# Patient Record
Sex: FEMALE | Race: WHITE | NOT HISPANIC OR LATINO | Employment: FULL TIME | URBAN - METROPOLITAN AREA
[De-identification: names, ages, dates, MRNs, and addresses within clinical notes are randomized per-mention and may not be internally consistent; named-entity substitution may affect disease eponyms.]

---

## 2022-03-24 ENCOUNTER — LAB VISIT (OUTPATIENT)
Dept: LAB | Facility: HOSPITAL | Age: 62
End: 2022-03-24
Attending: INTERNAL MEDICINE
Payer: COMMERCIAL

## 2022-03-24 DIAGNOSIS — Z00.00 ROUTINE MEDICAL EXAM: ICD-10-CM

## 2022-03-24 LAB
ALBUMIN SERPL BCP-MCNC: 3.9 G/DL (ref 3.5–5.2)
ALP SERPL-CCNC: 91 U/L (ref 55–135)
ALT SERPL W/O P-5'-P-CCNC: 9 U/L (ref 10–44)
ANION GAP SERPL CALC-SCNC: 8 MMOL/L (ref 8–16)
AST SERPL-CCNC: 17 U/L (ref 10–40)
BASOPHILS # BLD AUTO: 0.09 K/UL (ref 0–0.2)
BASOPHILS NFR BLD: 0.8 % (ref 0–1.9)
BILIRUB SERPL-MCNC: 0.3 MG/DL (ref 0.1–1)
BUN SERPL-MCNC: 15 MG/DL (ref 8–23)
CALCIUM SERPL-MCNC: 9.6 MG/DL (ref 8.7–10.5)
CHLORIDE SERPL-SCNC: 106 MMOL/L (ref 95–110)
CHOLEST SERPL-MCNC: 186 MG/DL (ref 120–199)
CHOLEST/HDLC SERPL: 3 {RATIO} (ref 2–5)
CO2 SERPL-SCNC: 26 MMOL/L (ref 23–29)
CREAT SERPL-MCNC: 0.7 MG/DL (ref 0.5–1.4)
DIFFERENTIAL METHOD: ABNORMAL
EOSINOPHIL # BLD AUTO: 0.1 K/UL (ref 0–0.5)
EOSINOPHIL NFR BLD: 1 % (ref 0–8)
ERYTHROCYTE [DISTWIDTH] IN BLOOD BY AUTOMATED COUNT: 13.1 % (ref 11.5–14.5)
EST. GFR  (AFRICAN AMERICAN): >60 ML/MIN/1.73 M^2
EST. GFR  (NON AFRICAN AMERICAN): >60 ML/MIN/1.73 M^2
GLUCOSE SERPL-MCNC: 105 MG/DL (ref 70–110)
HCT VFR BLD AUTO: 41.3 % (ref 37–48.5)
HDLC SERPL-MCNC: 62 MG/DL (ref 40–75)
HDLC SERPL: 33.3 % (ref 20–50)
HGB BLD-MCNC: 13.3 G/DL (ref 12–16)
IMM GRANULOCYTES # BLD AUTO: 0.04 K/UL (ref 0–0.04)
IMM GRANULOCYTES NFR BLD AUTO: 0.4 % (ref 0–0.5)
LDLC SERPL CALC-MCNC: 104 MG/DL (ref 63–159)
LYMPHOCYTES # BLD AUTO: 2.8 K/UL (ref 1–4.8)
LYMPHOCYTES NFR BLD: 25 % (ref 18–48)
MCH RBC QN AUTO: 31.1 PG (ref 27–31)
MCHC RBC AUTO-ENTMCNC: 32.2 G/DL (ref 32–36)
MCV RBC AUTO: 97 FL (ref 82–98)
MONOCYTES # BLD AUTO: 0.7 K/UL (ref 0.3–1)
MONOCYTES NFR BLD: 5.8 % (ref 4–15)
NEUTROPHILS # BLD AUTO: 7.5 K/UL (ref 1.8–7.7)
NEUTROPHILS NFR BLD: 67 % (ref 38–73)
NONHDLC SERPL-MCNC: 124 MG/DL
NRBC BLD-RTO: 0 /100 WBC
PLATELET # BLD AUTO: 251 K/UL (ref 150–450)
PMV BLD AUTO: 11 FL (ref 9.2–12.9)
POTASSIUM SERPL-SCNC: 4 MMOL/L (ref 3.5–5.1)
PROT SERPL-MCNC: 6.6 G/DL (ref 6–8.4)
RBC # BLD AUTO: 4.28 M/UL (ref 4–5.4)
SODIUM SERPL-SCNC: 140 MMOL/L (ref 136–145)
TRIGL SERPL-MCNC: 100 MG/DL (ref 30–150)
WBC # BLD AUTO: 11.18 K/UL (ref 3.9–12.7)

## 2022-03-24 PROCEDURE — 85025 COMPLETE CBC W/AUTO DIFF WBC: CPT | Performed by: INTERNAL MEDICINE

## 2022-03-24 PROCEDURE — 80061 LIPID PANEL: CPT | Performed by: INTERNAL MEDICINE

## 2022-03-24 PROCEDURE — 36415 COLL VENOUS BLD VENIPUNCTURE: CPT | Performed by: INTERNAL MEDICINE

## 2022-03-24 PROCEDURE — 80053 COMPREHEN METABOLIC PANEL: CPT | Performed by: INTERNAL MEDICINE

## 2023-03-23 PROBLEM — F17.200 SMOKER: Status: ACTIVE | Noted: 2023-03-23

## 2024-06-10 ENCOUNTER — HOSPITAL ENCOUNTER (EMERGENCY)
Facility: HOSPITAL | Age: 64
Discharge: HOME | End: 2024-06-10
Attending: EMERGENCY MEDICINE
Payer: COMMERCIAL

## 2024-06-10 VITALS
RESPIRATION RATE: 20 BRPM | HEART RATE: 84 BPM | TEMPERATURE: 98 F | DIASTOLIC BLOOD PRESSURE: 98 MMHG | OXYGEN SATURATION: 98 % | SYSTOLIC BLOOD PRESSURE: 148 MMHG

## 2024-06-10 DIAGNOSIS — T14.8XXA ANIMAL BITE: Primary | ICD-10-CM
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
## 2024-06-10 DIAGNOSIS — T14.8XXA PUNCTURE WOUND: ICD-10-CM

## 2024-06-10 DIAGNOSIS — S61.012A LACERATION OF LEFT THUMB WITHOUT FOREIGN BODY WITHOUT DAMAGE TO NAIL, INITIAL ENCOUNTER: ICD-10-CM

## 2024-06-10 DIAGNOSIS — S41.112A LACERATION OF LEFT UPPER EXTREMITY, INITIAL ENCOUNTER: ICD-10-CM

## 2024-06-10 PROCEDURE — 12042 INTMD RPR N-HF/GENIT2.6-7.5: CPT | Mod: 59

## 2024-06-10 PROCEDURE — 99284 EMERGENCY DEPT VISIT MOD MDM: CPT | Mod: 25

## 2024-06-10 PROCEDURE — 90471 IMMUNIZATION ADMIN: CPT | Performed by: NURSE PRACTITIONER

## 2024-06-10 PROCEDURE — 12032 INTMD RPR S/A/T/EXT 2.6-7.5: CPT

## 2024-06-10 PROCEDURE — 25000003 PHARM REV CODE 250: Performed by: NURSE PRACTITIONER

## 2024-06-10 PROCEDURE — 63600175 PHARM REV CODE 636 W HCPCS: Performed by: NURSE PRACTITIONER

## 2024-06-10 PROCEDURE — 90715 TDAP VACCINE 7 YRS/> IM: CPT | Performed by: NURSE PRACTITIONER

## 2024-06-10 RX ORDER — LORAZEPAM 0.5 MG/1
0.5 TABLET ORAL
Status: COMPLETED | OUTPATIENT
Start: 2024-06-10 | End: 2024-06-10

## 2024-06-10 RX ORDER — AMOXICILLIN AND CLAVULANATE POTASSIUM 875; 125 MG/1; MG/1
1 TABLET, FILM COATED ORAL 2 TIMES DAILY
Qty: 14 TABLET | Refills: 0 | Status: SHIPPED | OUTPATIENT
Start: 2024-06-10

## 2024-06-10 RX ORDER — IBUPROFEN 800 MG/1
800 TABLET ORAL EVERY 6 HOURS PRN
Qty: 20 TABLET | Refills: 0 | Status: SHIPPED | OUTPATIENT
Start: 2024-06-10 | End: 2024-06-18

## 2024-06-10 RX ORDER — IBUPROFEN 400 MG/1
800 TABLET ORAL
Status: COMPLETED | OUTPATIENT
Start: 2024-06-10 | End: 2024-06-10

## 2024-06-10 RX ORDER — AMOXICILLIN AND CLAVULANATE POTASSIUM 875; 125 MG/1; MG/1
1 TABLET, FILM COATED ORAL
Status: COMPLETED | OUTPATIENT
Start: 2024-06-10 | End: 2024-06-10

## 2024-06-10 RX ORDER — LIDOCAINE HYDROCHLORIDE 10 MG/ML
5 INJECTION, SOLUTION EPIDURAL; INFILTRATION; INTRACAUDAL; PERINEURAL
Status: COMPLETED | OUTPATIENT
Start: 2024-06-10 | End: 2024-06-10

## 2024-06-10 RX ADMIN — LORAZEPAM 0.5 MG: 0.5 TABLET ORAL at 08:06

## 2024-06-10 RX ADMIN — AMOXICILLIN AND CLAVULANATE POTASSIUM 1 TABLET: 875; 125 TABLET, FILM COATED ORAL at 08:06

## 2024-06-10 RX ADMIN — IBUPROFEN 800 MG: 400 TABLET ORAL at 08:06

## 2024-06-10 RX ADMIN — TETANUS TOXOID, REDUCED DIPHTHERIA TOXOID AND ACELLULAR PERTUSSIS VACCINE, ADSORBED 0.5 ML: 5; 2.5; 8; 8; 2.5 SUSPENSION INTRAMUSCULAR at 08:06

## 2024-06-10 RX ADMIN — LIDOCAINE HYDROCHLORIDE 50 MG: 10 SOLUTION INTRAVENOUS at 08:06

## 2024-06-10 NOTE — ED PROVIDER NOTES
Encounter Date: 6/10/2024       History     Chief Complaint   Patient presents with    Animal Bite     Bitten by family dog 25M ago. Laceration to L forearm and L thumb. Rating pain 10/10. Very anxious.      64 yr old female presents to the ER with reports of dog bite to the left forearm and hand. Pt states she is not up to date on tetanus and dog is 1 year behind on vaccines however refuses rabies vaccine. Pt states she went to get a remote from the dog and then stuck her hand in his mouth when he jumped toward her. Bleeding is controlled. Pt has full ROM and sensation intact. No PMH reported.     The history is provided by the patient and a relative. No  was used.     Review of patient's allergies indicates:  No Known Allergies  No past medical history on file.  No past surgical history on file.  No family history on file.  Social History     Tobacco Use    Smoking status: Every Day     Current packs/day: 1.00     Average packs/day: 1 pack/day for 34.4 years (34.4 ttl pk-yrs)     Types: Cigarettes     Start date: 1/1/1990    Smokeless tobacco: Never   Substance Use Topics    Alcohol use: Never     Review of Systems   Skin:  Positive for wound.   All other systems reviewed and are negative.      Physical Exam     Initial Vitals [06/10/24 0823]   BP Pulse Resp Temp SpO2   (!) 195/98 84 20 97.7 °F (36.5 °C) 98 %      MAP       --         Physical Exam    Constitutional: She appears well-developed and well-nourished.   HENT:   Head: Normocephalic.   Right Ear: Hearing and tympanic membrane normal.   Left Ear: Hearing and tympanic membrane normal.   Nose: Nose normal.   Mouth/Throat: Oropharynx is clear and moist.   Eyes: Lids are normal. Pupils are equal, round, and reactive to light.   Neck:   Normal range of motion.  Cardiovascular:  Normal rate.           Pulmonary/Chest: Breath sounds normal. No respiratory distress. She has no wheezes. She has no rhonchi.   Abdominal: Abdomen is soft. There is  no abdominal tenderness.   Musculoskeletal:         General: Normal range of motion.      Cervical back: Normal range of motion. No rigidity.     Neurological: She is alert and oriented to person, place, and time.   Skin: Skin is warm and dry. Laceration noted.        There is a 4cm v shaped lac noted to the left forearm. Bleeding is controlled. No foreign body noted.     There is a 3cm macerated lac to the left thumb. Does not cross the nailbed. Pt has full ROM of the thumb with sensation intact.        Psychiatric: She has a normal mood and affect. Her behavior is normal. Judgment and thought content normal.         ED Course   Lac Repair    Date/Time: 6/10/2024 9:00 AM    Performed by: Marlyn Benton NP  Authorized by: Theo Pardo DO    Consent:     Risks discussed:  Infection  Universal protocol:     Procedure explained and questions answered to patient or proxy's satisfaction: yes      Immediately prior to procedure, a time out was called: yes      Patient identity confirmed:  Verbally with patient and arm band  Laceration details:     Location: Left forearm and thumb.  Pre-procedure details:     Preparation:  Patient was prepped and draped in usual sterile fashion  Exploration:     Wound extent: no foreign bodies/material noted, no muscle damage noted, no nerve damage noted, no tendon damage noted, no underlying fracture noted and no vascular damage noted    Treatment:     Area cleansed with:  Chlorhexidine    Amount of cleaning:  Extensive    Irrigation solution:  Sterile saline    Irrigation method:  Syringe    Debridement:  None  Skin repair:     Repair method:  Sutures    Suture size:  4-0    Suture material:  Nylon    Suture technique:  Simple interrupted    Number of sutures:  13  Approximation:     Approximation:  Close  Repair type:     Repair type:  Simple  Post-procedure details:     Dressing:  Non-adherent dressing    Procedure completion:  Tolerated well, no immediate  complications  Comments:      The wounds were approximated however left space in between due to this being dog bites. Pt notified on wound care, antbx prescribed and to monitor for any signs of infection.     Labs Reviewed - No data to display       Imaging Results    None          Medications   Tdap (BOOSTRIX) vaccine injection 0.5 mL (0.5 mLs Intramuscular Given 6/10/24 0842)   LIDOcaine (PF) 10 mg/ml (1%) injection 50 mg (50 mg Infiltration Given 6/10/24 0843)   amoxicillin-clavulanate 875-125mg per tablet 1 tablet (1 tablet Oral Given 6/10/24 0841)   ibuprofen tablet 800 mg (800 mg Oral Given 6/10/24 0842)   LORazepam tablet 0.5 mg (0.5 mg Oral Given 6/10/24 0841)     Medical Decision Making  Differential Diagnosis includes, but is not limited to:  Open fracture, vascular injury, tendon/ligament injury, nerve injury, retained foreign body, superficial laceration, abrasion.     Risk  Prescription drug management.               ED Course as of 06/10/24 2003   Mon Deven 10, 2024   1000 Patient tolerated the sutures well.  She was updated on her tetanus immunization in addition to given ibuprofen and Augmentin.  She will be sent with a prescription for Augmentin.  The patient has been told to follow up with her primary care provider within the next 5 days for suture removal in addition to a referral being placed to Dr. Melgoza.  I have thoroughly reviewed with the patient the need for wound care in addition to monitoring for any signs of infection.  Patient verbalized understanding.  She is nontoxic in appearance and has had much improvement in her condition after the Ativan was given.  She is otherwise stable at this time for discharge. [DT]      ED Course User Index  [DT] Marlyn Benton, YOLANDA                           Clinical Impression:  Final diagnoses:  [T14.8XXA] Animal bite (Primary)  [W54.0XXA] Dog bite, initial encounter  [S61.012A] Laceration of left thumb without foreign body without damage to nail,  initial encounter  [S41.112A] Laceration of left upper extremity, initial encounter  [T14.8XXA] Puncture wound          ED Disposition Condition    Discharge Stable          ED Prescriptions       Medication Sig Dispense Start Date End Date Auth. Provider    amoxicillin-clavulanate 875-125mg (AUGMENTIN) 875-125 mg per tablet Take 1 tablet by mouth 2 (two) times daily. 14 tablet 6/10/2024 -- Marlyn Benton NP    ibuprofen (ADVIL,MOTRIN) 800 MG tablet Take 1 tablet (800 mg total) by mouth every 6 (six) hours as needed for Pain. 20 tablet 6/10/2024 -- Marlyn Benton NP          Follow-up Information       Follow up With Specialties Details Why Contact Info    Arthur Bauman MD Internal Medicine Schedule an appointment as soon as possible for a visit in 1 week For suture removal [address]  [address]  [place] [address]  [phone]      Alessandro Melgoza Jr., MD Hand Surgery, Orthopedic Surgery Schedule an appointment as soon as possible for a visit in 3 days  [address]  [address]  [place] [address]  [phone]               Marlyn Benton NP  06/10/24 2004

## 2024-06-10 NOTE — DISCHARGE INSTRUCTIONS

## 2024-06-14 ENCOUNTER — TELEPHONE (OUTPATIENT)
Dept: ORTHOPEDICS | Facility: CLINIC | Age: 64
End: 2024-06-14
Payer: COMMERCIAL

## 2024-06-14 NOTE — TELEPHONE ENCOUNTER
----- Message from Liliya Suarez sent at 6/12/2024 12:44 PM CDT -----  Regarding: FW: ortho f/u  Idania Alford ,     Can you help me schedule this patient ?  ----- Message -----  From: Alessandro Melgoza Jr., MD  Sent: 6/12/2024  12:40 PM CDT  To: Liliya Suarez  Subject: RE: ortho f/u                                    Yes please make appt for the next 2-4 days  ----- Message -----  From: Liliya Suarez  Sent: 6/11/2024   4:19 PM CDT  To: Alessandro Melgoza Jr., MD  Subject: ortho f/u                                        Good-afternoon      When would you like to see this patient in clinic ?

## 2024-06-14 NOTE — TELEPHONE ENCOUNTER
Spoke to patient. Offered patient an appointment for 6/18 with Landy Guillory PA-C. Patient stated that she has an appointment scheduled with Dr. Bauman on Tuesday for suture removal and feel that she does not need to be seen by Ortho at this time because she is able to move her fingers. She stated she will give us a call if an appointment is needed.

## 2024-11-10 ENCOUNTER — HOSPITAL ENCOUNTER (INPATIENT)
Facility: HOSPITAL | Age: 64
LOS: 2 days | Discharge: HOME | DRG: 200 | End: 2024-11-12
Attending: EMERGENCY MEDICINE | Admitting: INTERNAL MEDICINE
Payer: COMMERCIAL

## 2024-11-10 DIAGNOSIS — J93.9 PNEUMOTHORAX ON LEFT: Primary | ICD-10-CM

## 2024-11-10 DIAGNOSIS — J44.9 CHRONIC OBSTRUCTIVE PULMONARY DISEASE, UNSPECIFIED COPD TYPE: ICD-10-CM

## 2024-11-10 DIAGNOSIS — R07.9 CHEST PAIN: ICD-10-CM

## 2024-11-10 PROBLEM — F17.200 TOBACCO DEPENDENCY: Status: ACTIVE | Noted: 2024-11-10

## 2024-11-10 LAB
ALBUMIN SERPL BCP-MCNC: 4 G/DL (ref 3.5–5.2)
ALP SERPL-CCNC: 106 U/L (ref 40–150)
ALT SERPL W/O P-5'-P-CCNC: 12 U/L (ref 10–44)
ANION GAP SERPL CALC-SCNC: 10 MMOL/L (ref 8–16)
AST SERPL-CCNC: 15 U/L (ref 10–40)
BASOPHILS # BLD AUTO: 0.06 K/UL (ref 0–0.2)
BASOPHILS NFR BLD: 1 % (ref 0–1.9)
BILIRUB SERPL-MCNC: 0.5 MG/DL (ref 0.1–1)
BNP SERPL-MCNC: 51 PG/ML (ref 0–99)
BUN SERPL-MCNC: 11 MG/DL (ref 8–23)
CALCIUM SERPL-MCNC: 9.4 MG/DL (ref 8.7–10.5)
CHLORIDE SERPL-SCNC: 107 MMOL/L (ref 95–110)
CO2 SERPL-SCNC: 22 MMOL/L (ref 23–29)
CREAT SERPL-MCNC: 0.7 MG/DL (ref 0.5–1.4)
D DIMER PPP IA.FEU-MCNC: 0.38 MG/L FEU
DIFFERENTIAL METHOD BLD: ABNORMAL
EOSINOPHIL # BLD AUTO: 0.1 K/UL (ref 0–0.5)
EOSINOPHIL NFR BLD: 2.1 % (ref 0–8)
ERYTHROCYTE [DISTWIDTH] IN BLOOD BY AUTOMATED COUNT: 13.2 % (ref 11.5–14.5)
EST. GFR  (NO RACE VARIABLE): >60 ML/MIN/1.73 M^2
GLUCOSE SERPL-MCNC: 160 MG/DL (ref 70–110)
HCT VFR BLD AUTO: 44.9 % (ref 37–48.5)
HGB BLD-MCNC: 15.3 G/DL (ref 12–16)
IMM GRANULOCYTES # BLD AUTO: 0.01 K/UL (ref 0–0.04)
IMM GRANULOCYTES NFR BLD AUTO: 0.2 % (ref 0–0.5)
LYMPHOCYTES # BLD AUTO: 2.2 K/UL (ref 1–4.8)
LYMPHOCYTES NFR BLD: 35.1 % (ref 18–48)
MAGNESIUM SERPL-MCNC: 1.8 MG/DL (ref 1.6–2.6)
MCH RBC QN AUTO: 31.7 PG (ref 27–31)
MCHC RBC AUTO-ENTMCNC: 34.1 G/DL (ref 32–36)
MCV RBC AUTO: 93 FL (ref 82–98)
MONOCYTES # BLD AUTO: 0.5 K/UL (ref 0.3–1)
MONOCYTES NFR BLD: 7.3 % (ref 4–15)
NEUTROPHILS # BLD AUTO: 3.4 K/UL (ref 1.8–7.7)
NEUTROPHILS NFR BLD: 54.3 % (ref 38–73)
NRBC BLD-RTO: 0 /100 WBC
PLATELET # BLD AUTO: 253 K/UL (ref 150–450)
PMV BLD AUTO: 10.6 FL (ref 9.2–12.9)
POTASSIUM SERPL-SCNC: 3.9 MMOL/L (ref 3.5–5.1)
PROT SERPL-MCNC: 7 G/DL (ref 6–8.4)
RBC # BLD AUTO: 4.83 M/UL (ref 4–5.4)
SODIUM SERPL-SCNC: 139 MMOL/L (ref 136–145)
TROPONIN I SERPL DL<=0.01 NG/ML-MCNC: <0.006 NG/ML (ref 0–0.03)
WBC # BLD AUTO: 6.19 K/UL (ref 3.9–12.7)

## 2024-11-10 PROCEDURE — 93005 ELECTROCARDIOGRAM TRACING: CPT

## 2024-11-10 PROCEDURE — 93010 ELECTROCARDIOGRAM REPORT: CPT | Mod: ,,, | Performed by: INTERNAL MEDICINE

## 2024-11-10 PROCEDURE — 25000003 PHARM REV CODE 250

## 2024-11-10 PROCEDURE — 80053 COMPREHEN METABOLIC PANEL: CPT | Performed by: PHYSICIAN ASSISTANT

## 2024-11-10 PROCEDURE — 85025 COMPLETE CBC W/AUTO DIFF WBC: CPT | Performed by: PHYSICIAN ASSISTANT

## 2024-11-10 PROCEDURE — 0W9B30Z DRAINAGE OF LEFT PLEURAL CAVITY WITH DRAINAGE DEVICE, PERCUTANEOUS APPROACH: ICD-10-PCS | Performed by: EMERGENCY MEDICINE

## 2024-11-10 PROCEDURE — 25000003 PHARM REV CODE 250: Performed by: STUDENT IN AN ORGANIZED HEALTH CARE EDUCATION/TRAINING PROGRAM

## 2024-11-10 PROCEDURE — 83880 ASSAY OF NATRIURETIC PEPTIDE: CPT | Performed by: PHYSICIAN ASSISTANT

## 2024-11-10 PROCEDURE — 63600175 PHARM REV CODE 636 W HCPCS

## 2024-11-10 PROCEDURE — 63600175 PHARM REV CODE 636 W HCPCS: Performed by: EMERGENCY MEDICINE

## 2024-11-10 PROCEDURE — 96374 THER/PROPH/DIAG INJ IV PUSH: CPT

## 2024-11-10 PROCEDURE — 99285 EMERGENCY DEPT VISIT HI MDM: CPT | Mod: 25

## 2024-11-10 PROCEDURE — 85379 FIBRIN DEGRADATION QUANT: CPT | Performed by: PHYSICIAN ASSISTANT

## 2024-11-10 PROCEDURE — 96375 TX/PRO/DX INJ NEW DRUG ADDON: CPT

## 2024-11-10 PROCEDURE — S4991 NICOTINE PATCH NONLEGEND: HCPCS | Performed by: STUDENT IN AN ORGANIZED HEALTH CARE EDUCATION/TRAINING PROGRAM

## 2024-11-10 PROCEDURE — 83735 ASSAY OF MAGNESIUM: CPT | Performed by: PHYSICIAN ASSISTANT

## 2024-11-10 PROCEDURE — 87486 CHLMYD PNEUM DNA AMP PROBE: CPT | Performed by: STUDENT IN AN ORGANIZED HEALTH CARE EDUCATION/TRAINING PROGRAM

## 2024-11-10 PROCEDURE — 32556 INSERT CATH PLEURA W/O IMAGE: CPT

## 2024-11-10 PROCEDURE — 11000001 HC ACUTE MED/SURG PRIVATE ROOM

## 2024-11-10 PROCEDURE — 84484 ASSAY OF TROPONIN QUANT: CPT | Performed by: PHYSICIAN ASSISTANT

## 2024-11-10 RX ORDER — ACETAMINOPHEN 325 MG/1
650 TABLET ORAL EVERY 4 HOURS PRN
Status: DISCONTINUED | OUTPATIENT
Start: 2024-11-10 | End: 2024-11-12 | Stop reason: HOSPADM

## 2024-11-10 RX ORDER — MORPHINE SULFATE 2 MG/ML
6 INJECTION, SOLUTION INTRAMUSCULAR; INTRAVENOUS
Status: COMPLETED | OUTPATIENT
Start: 2024-11-10 | End: 2024-11-10

## 2024-11-10 RX ORDER — OXYCODONE HYDROCHLORIDE 5 MG/1
5 TABLET ORAL EVERY 6 HOURS PRN
Status: DISCONTINUED | OUTPATIENT
Start: 2024-11-10 | End: 2024-11-10

## 2024-11-10 RX ORDER — SIMETHICONE 125 MG
125 TABLET,CHEWABLE ORAL EVERY 6 HOURS PRN
Status: DISCONTINUED | OUTPATIENT
Start: 2024-11-10 | End: 2024-11-12 | Stop reason: HOSPADM

## 2024-11-10 RX ORDER — OXYCODONE HYDROCHLORIDE 5 MG/1
5 TABLET ORAL EVERY 4 HOURS PRN
Status: DISCONTINUED | OUTPATIENT
Start: 2024-11-10 | End: 2024-11-12 | Stop reason: HOSPADM

## 2024-11-10 RX ORDER — IBUPROFEN 200 MG
16 TABLET ORAL
Status: DISCONTINUED | OUTPATIENT
Start: 2024-11-10 | End: 2024-11-12 | Stop reason: HOSPADM

## 2024-11-10 RX ORDER — NALOXONE HCL 0.4 MG/ML
0.02 VIAL (ML) INJECTION
Status: DISCONTINUED | OUTPATIENT
Start: 2024-11-10 | End: 2024-11-12 | Stop reason: HOSPADM

## 2024-11-10 RX ORDER — LIDOCAINE HYDROCHLORIDE 10 MG/ML
10 INJECTION, SOLUTION EPIDURAL; INFILTRATION; INTRACAUDAL; PERINEURAL ONCE
Status: COMPLETED | OUTPATIENT
Start: 2024-11-10 | End: 2024-11-10

## 2024-11-10 RX ORDER — LIDOCAINE HYDROCHLORIDE 10 MG/ML
10 INJECTION, SOLUTION INFILTRATION; PERINEURAL
Status: DISCONTINUED | OUTPATIENT
Start: 2024-11-10 | End: 2024-11-10

## 2024-11-10 RX ORDER — SODIUM CHLORIDE 0.9 % (FLUSH) 0.9 %
10 SYRINGE (ML) INJECTION EVERY 12 HOURS PRN
Status: DISCONTINUED | OUTPATIENT
Start: 2024-11-10 | End: 2024-11-12 | Stop reason: HOSPADM

## 2024-11-10 RX ORDER — MIDAZOLAM HYDROCHLORIDE 2 MG/2ML
1 INJECTION, SOLUTION INTRAMUSCULAR; INTRAVENOUS
Status: COMPLETED | OUTPATIENT
Start: 2024-11-10 | End: 2024-11-10

## 2024-11-10 RX ORDER — GLUCAGON 1 MG
1 KIT INJECTION
Status: DISCONTINUED | OUTPATIENT
Start: 2024-11-10 | End: 2024-11-12 | Stop reason: HOSPADM

## 2024-11-10 RX ORDER — IBUPROFEN 200 MG
1 TABLET ORAL DAILY
Status: DISCONTINUED | OUTPATIENT
Start: 2024-11-10 | End: 2024-11-12 | Stop reason: HOSPADM

## 2024-11-10 RX ORDER — ENOXAPARIN SODIUM 100 MG/ML
40 INJECTION SUBCUTANEOUS EVERY 24 HOURS
Status: DISCONTINUED | OUTPATIENT
Start: 2024-11-10 | End: 2024-11-12 | Stop reason: HOSPADM

## 2024-11-10 RX ORDER — IBUPROFEN 200 MG
24 TABLET ORAL
Status: DISCONTINUED | OUTPATIENT
Start: 2024-11-10 | End: 2024-11-12 | Stop reason: HOSPADM

## 2024-11-10 RX ADMIN — SIMETHICONE 125 MG: 125 TABLET, CHEWABLE ORAL at 10:11

## 2024-11-10 RX ADMIN — LIDOCAINE HYDROCHLORIDE 100 MG: 10 INJECTION, SOLUTION EPIDURAL; INFILTRATION; INTRACAUDAL at 01:11

## 2024-11-10 RX ADMIN — NICOTINE 1 PATCH: 14 PATCH, EXTENDED RELEASE TRANSDERMAL at 05:11

## 2024-11-10 RX ADMIN — ENOXAPARIN SODIUM 40 MG: 40 INJECTION SUBCUTANEOUS at 05:11

## 2024-11-10 RX ADMIN — OXYCODONE 5 MG: 5 TABLET ORAL at 10:11

## 2024-11-10 RX ADMIN — MIDAZOLAM HYDROCHLORIDE 1 MG: 1 INJECTION, SOLUTION INTRAMUSCULAR; INTRAVENOUS at 12:11

## 2024-11-10 RX ADMIN — OXYCODONE 5 MG: 5 TABLET ORAL at 05:11

## 2024-11-10 RX ADMIN — MORPHINE SULFATE 6 MG: 2 INJECTION, SOLUTION INTRAMUSCULAR; INTRAVENOUS at 12:11

## 2024-11-10 NOTE — ED TRIAGE NOTES
Patient arrives to the ED w/ complaints of L sided neck and chest pain. Reports pain starts in L neck and radiates to the subclavicular area. Reports heavy, pressure-like pain 10 out of 10 that has been constant for about 22 hr. Reports SOB secondary to the pain. Denies any pain changes w/ movement of L arm. Reports daily cigarette smoking. Denies cough except when smoking. Denies N/V/D, headache, blurred vision, dizziness, urinary changes.  Denies injury or trauma. Reports taking Aleve yesterday w/o relief.  at the bedside and reports patient is active and lifts heavy objects regularly.

## 2024-11-10 NOTE — ED NOTES
Pt seen in room AAOx4, states she came in for L sided chest pain that she has been experiencing for approximately one day. Pt is not in visible distress. Pleasant to interact with. On cardiac monitor, bed locked, in low position, and bed rails raised x2.  is at bedside.

## 2024-11-10 NOTE — H&P
[facility] Medicine H&P Note     Admitting Team: [facility] Hospitalist Team B  Attending Physician: Caro Swan MD  Resident: Eddie  Intern: Christina    Date of Admit: 11/10/2024    Chief Complaint     Chest Pain (The pt presents to the ED with a complaint of left sided chest pain/left shoulder pain/left sided neck pain that yesterday afternoon.  The pt describes the feeling as heaviness.  No N/V.)    Subjective:      History of Present Illness:  Christin Luna is a 64 y.o.  female who no known past medical history who presented top [facility] with left sided chest pain that radiated to left neck/shoulder for 2 days.     The patient was in their usual state of health (ambulatory, fairly active, independently completes all ADLs) until yesterday when she suddenly felt left sided chest pain radiating to her neck as she was moving some things around. She at first thought she may have pulled a muscle and attempted to rest for the rest of the day. She was unable to get comfortable and continued to have chest pain and pain when taking a deep breath. This morning, patient's shortness of breath and pain with inspiration worsened to the point where she came to the Emergency room to be checked out. Patient denied any nausea or vomiting but does endorse a chronic cough. Of note, patient has not seen a doctor in quite some time.     Past Medical History:  History reviewed. No pertinent past medical history.    Past Surgical History:  History reviewed. No pertinent surgical history.    Allergies:  Review of patient's allergies indicates:  No Known Allergies    Home Medications:  Naproxen 1-2 tablets prn     Family History:  No family history on file.    Social History:  Social History     Tobacco Use    Smoking status: Every Day     Current packs/day: 1.00     Average packs/day: 1 pack/day for 34.9 years (34.9 ttl pk-yrs)     Types: Cigarettes     Start date: 1/1/1990    Smokeless tobacco: Never   Substance  "Use Topics    Alcohol use: Never    Drug use: Not Currently       Review of Systems:  All other systems not mentioned in HPI were reviewed and are negative.    Health Maintaince :   Primary Care Physician: Dr. Bauman    Immunizations:   Tdap: up to date   Flu: NOT up to date   Pna: NOT up to date     Cancer Screening:  PAP: not up to date   MMG: up to date, due   Colonoscopy: up to date, due      Objective:   Last 24 Hour Vital Signs:  BP  Min: 135/63  Max: 179/83  Temp  Av °F (36.7 °C)  Min: 98 °F (36.7 °C)  Max: 98 °F (36.7 °C)  Pulse  Av.7  Min: 49  Max: 75  Resp  Av.4  Min: 17  Max: 40  SpO2  Av.6 %  Min: 93 %  Max: 100 %  Height  Av' 3" (160 cm)  Min: 5' 3" (160 cm)  Max: 5' 3" (160 cm)  Weight  Av.4 kg (120 lb)  Min: 54.4 kg (120 lb)  Max: 54.4 kg (120 lb)  Body mass index is 21.26 kg/m².  No intake/output data recorded.    Physical Examination:  Physical Exam  Vitals reviewed.   Constitutional:       General: She is awake.      Comments: Patient is clearly uncomfortable with pain from pigtail, though not in acute distress   HENT:      Head: Normocephalic.      Mouth/Throat:      Mouth: Mucous membranes are moist.      Pharynx: Oropharynx is clear.   Eyes:      Extraocular Movements: Extraocular movements intact.   Cardiovascular:      Rate and Rhythm: Normal rate and regular rhythm.      Heart sounds: Normal heart sounds.   Pulmonary:      Breath sounds: Normal breath sounds.      Comments: Pigtail placed to suction present on left upper flank  Abdominal:      General: Abdomen is flat. There is no distension.      Tenderness: There is no abdominal tenderness.   Musculoskeletal:         General: No tenderness.      Right lower leg: No edema.      Left lower leg: No edema.   Skin:     General: Skin is warm and dry.   Neurological:      General: No focal deficit present.      Mental Status: She is alert.   Psychiatric:         Mood and Affect: Mood normal.         Behavior: " Behavior is cooperative.          Laboratory:  Most Recent Data:  CBC:   Lab Results   Component Value Date    WBC 6.19 11/10/2024    HGB 15.3 11/10/2024    HCT 44.9 11/10/2024     11/10/2024    MCV 93 11/10/2024    RDW 13.2 11/10/2024     BMP:   Lab Results   Component Value Date     11/10/2024    K 3.9 11/10/2024     11/10/2024    CO2 22 (L) 11/10/2024    BUN 11 11/10/2024    CREATININE 0.7 11/10/2024     (H) 11/10/2024    CALCIUM 9.4 11/10/2024    MG 1.8 11/10/2024     LFTs:   Lab Results   Component Value Date    PROT 7.0 11/10/2024    ALBUMIN 4.0 11/10/2024    BILITOT 0.5 11/10/2024    AST 15 11/10/2024    ALKPHOS 106 11/10/2024    ALT 12 11/10/2024     Cardiac:   Lab Results   Component Value Date    TROPONINI <0.006 11/10/2024    BNP 51 11/10/2024       Trended Lab Data:  Recent Labs   Lab 11/10/24  1011   WBC 6.19   HGB 15.3   HCT 44.9      MCV 93   RDW 13.2      K 3.9      CO2 22*   BUN 11   CREATININE 0.7   *   PROT 7.0   ALBUMIN 4.0   BILITOT 0.5   AST 15   ALKPHOS 106   ALT 12       Trended Cardiac Data:  Recent Labs   Lab 11/10/24  1011   TROPONINI <0.006   BNP 51       Other Results:  EKG (my interpretation): sinus rhythm with frequent PVCs    Radiology:  Chest X-ray (11/10/24@ 10:54): Small to moderate left pneumothorax.  There is no evidence of mediastinal shift.  The lungs are clear.  No pleural fluid.  Osseous structures are intact.   Chest X-ray (11/10/24@ 13:56): Since the previous examination, left pleural drainage catheter has been placed with near complete re-expansion of the left hemithorax noting minimal apical pneumothorax remains.  There is subcutaneous emphysema along the left chest wall related to tube placement.  No additional detrimental changes since the previous exam.   CT Chest without contrast (11/10/2024): Left-sided pleural drainage catheter in appropriate position.  Small residual left pneumothorax. Aerated portions of the  lungs demonstrates diffuse centrilobular and paraseptal emphysematous change, fibro nodular biapical scarring, and bibasilar atelectasis.  No large region consolidation or pleural effusion. Scattered pulmonary micro nodules.  For multiple solid nodules all <6 mm, Fleischner Society 2017 guidelines recommend no routine follow up for a low risk patient, or follow up with non-contrast chest CT at 12 months after discovery in a high risk patient. Diffuse subcutaneous emphysema throughout the left axillary and thoracic soft tissues.     Assessment:     Christin Luna is a 64 y.o.  female who no known past medical history who presented top [facility] with left sided chest pain that radiated to left neck/shoulder for 2 days. This pain was associated with attempting to take a deep breath. Upon arrival to emergency room, cardiac lab workup was negative, but patient was found to have a left sided pneumothorax on chest xray, and left sided pigtail was subsequently placed with near complete resolution of pneumothorax. [facility] medicine will admit for management of pneumothorax.        Plan:     Left pneumothorax, small to moderate  -Patient with new left sided chest pain with radiation to left neck. Associated with increased pain upon deep inspiration  -Troponin <0.006, BNP wnl   -CXR significant for left sided pneumothorax  -Pigtail (L) placed in ER with near complete resolution of pneumothorax per repeat chest X-ray   -Pulmonology consulted  -CT Chest without contrast showed diffuse subcutaneous emphysema   -Continue pigtail with suction for now  -Pain control with oxycodone 5 mg q6h PRN and acetaminophen 650 mg q4h PRN    Chronic cough in association with significant smoking history   Likely undiagnosed COPD  -will need outpatient pulmonology follow up   -CT Chest without contrast showed diffuse subcutaneous emphysema   -PFTs outpatient     Smoking history   -patient with significant smoking history   -nicotine  patch ordered while inpatient   -Smoking cessation counseling     Healthcare Maintenance   -A1c, lipid panel ordered     Code: Full  Diet: Normal Adult  DVT Ppx: enoxaparin   Dispo: pending pneumothorax management and symptom resolution      Carol Vasquez MD  [facility] Neurology, PGY-1  [facility] Hospital Medicine Team B    [facility] Medicine Hospitalist Pager numbers:   [facility] Hospitalist Medicine Team A (Annette/Shalom): [phone]  [facility] Hospitalist Medicine Team B (Nery/Toshia):  [phone]

## 2024-11-10 NOTE — ED PROVIDER NOTES
"Encounter Date: 11/10/2024       History     Chief Complaint   Patient presents with    Chest Pain     The pt presents to the ED with a complaint of left sided chest pain/left shoulder pain/left sided neck pain that yesterday afternoon.  The pt describes the feeling as heaviness.  No N/V.     64-year-old female, denying significant past medical history, smoker, presents to ED with concern of left-sided neck, shoulder and chest pain that began suddenly yesterday around 3:00 p.m..  Pain has been constant, sharp, does somewhat worsened with touch but unchanged with movement, severity 10/10.  She she does report occasional "smoker's cough" but currently denying any shortness of breath.  No fevers, chills, URI like symptoms, abdominal pain, nausea, vomiting, diarrhea, urinary complaints.  No recent travel or surgery, history of DVT/PE, use of hormone medications, leg swelling.  Denies significant cardiac history.  No other acute complaints at this time    The history is provided by the patient.     Review of patient's allergies indicates:  No Known Allergies  History reviewed. No pertinent past medical history.  History reviewed. No pertinent surgical history.  No family history on file.  Social History     Tobacco Use    Smoking status: Every Day     Current packs/day: 1.00     Average packs/day: 1 pack/day for 34.9 years (34.9 ttl pk-yrs)     Types: Cigarettes     Start date: 1/1/1990    Smokeless tobacco: Never   Substance Use Topics    Alcohol use: Never    Drug use: Not Currently     Review of Systems   Constitutional:  Negative for chills and fever.   HENT:  Negative for congestion, ear pain and sore throat.    Respiratory:  Negative for cough and shortness of breath.    Cardiovascular:  Positive for chest pain. Negative for palpitations and leg swelling.   Gastrointestinal:  Negative for abdominal pain, diarrhea, nausea and vomiting.   Genitourinary:  Negative for dysuria.   Musculoskeletal:  Positive for " arthralgias and neck pain. Negative for neck stiffness.   Neurological:  Negative for headaches.       Physical Exam     Initial Vitals [11/10/24 0936]   BP Pulse Resp Temp SpO2   (!) 150/75 75 (!) 22 98 °F (36.7 °C) (!) 93 %      MAP       --         Physical Exam    Nursing note and vitals reviewed.  Constitutional: She appears well-developed and well-nourished. She is cooperative. She does not have a sickly appearance. She does not appear ill. No distress.   Anxious appearing   HENT:   Head: Normocephalic and atraumatic.   Eyes: EOM are normal.   Neck: Neck supple.   Mildly reproducible tenderness to left-sided cervical paraspinal muscle/trapezius.  No midline bony tenderness.  No skin changes or rashes.   Normal range of motion.  Cardiovascular:  Normal rate, regular rhythm and normal heart sounds.           Pulmonary/Chest: Effort normal and breath sounds normal.   Speaking in full sentences with no labored breathing.  No wheezing noted.  Lungs otherwise sound clear.  Left upper chest wall has mild tenderness with touch   Abdominal: Abdomen is soft. There is no abdominal tenderness.   Musculoskeletal:         General: No tenderness.      Cervical back: Normal range of motion and neck supple.     Neurological: She is alert and oriented to person, place, and time. She is not disoriented. GCS eye subscore is 4. GCS verbal subscore is 5. GCS motor subscore is 6.   Skin: Skin is warm and dry.   Psychiatric: She has a normal mood and affect. Her speech is normal and behavior is normal. Thought content normal.         ED Course   Chest Tube    Date/Time: 11/10/2024 2:16 PM  Location procedure was performed: [facility] EMERGENCY DEPARTMENT    Performed by: Caro Swan MD  Authorized by: Caro Swan MD  Consent Done: Yes  Consent: Verbal consent not obtained. Written consent obtained.  Risks and benefits: risks, benefits and alternatives were discussed  Consent given by: patient  Patient understanding: patient  "states understanding of the procedure being performed  Patient consent: the patient's understanding of the procedure matches consent given  Procedure consent: procedure consent matches procedure scheduled  Relevant documents: relevant documents present and verified  Test results: test results available and properly labeled  Site marked: the operative site was marked  Imaging studies: imaging studies available  Patient identity confirmed: , name and verbally with patient  Time out: Immediately prior to procedure a "time out" was called to verify the correct patient, procedure, equipment, support staff and site/side marked as required.  Indications: pneumothorax    Patient sedated: no  Anesthesia: local infiltration    Anesthesia:  Local Anesthetic: lidocaine 1% without epinephrine  Anesthetic total: 10 mL  Preparation: skin prepped with ChloraPrep  Placement location: left anterior  Scalpel size: 11  Tube size (Croatian): 14.  Ultrasound guidance: no  Tension pneumothorax heard: no  Tube connected to: water seal  Suture material: 0 silk  Dressing: 4x4 sterile gauze and Xeroform gauze  Post-insertion x-ray findings: tube in good position  Patient tolerance: Patient tolerated the procedure well with no immediate complications  Complications: No        Labs Reviewed   CBC W/ AUTO DIFFERENTIAL - Abnormal       Result Value    WBC 6.19      RBC 4.83      Hemoglobin 15.3      Hematocrit 44.9      MCV 93      MCH 31.7 (*)     MCHC 34.1      RDW 13.2      Platelets 253      MPV 10.6      Immature Granulocytes 0.2      Gran # (ANC) 3.4      Immature Grans (Abs) 0.01      Lymph # 2.2      Mono # 0.5      Eos # 0.1      Baso # 0.06      nRBC 0      Gran % 54.3      Lymph % 35.1      Mono % 7.3      Eosinophil % 2.1      Basophil % 1.0      Differential Method Automated     COMPREHENSIVE METABOLIC PANEL - Abnormal    Sodium 139      Potassium 3.9      Chloride 107      CO2 22 (*)     Glucose 160 (*)     BUN 11      Creatinine " 0.7      Calcium 9.4      Total Protein 7.0      Albumin 4.0      Total Bilirubin 0.5      Alkaline Phosphatase 106      AST 15      ALT 12      eGFR >60      Anion Gap 10     TROPONIN I    Troponin I <0.006     B-TYPE NATRIURETIC PEPTIDE    BNP 51     D DIMER, QUANTITATIVE    D-Dimer 0.38     MAGNESIUM    Magnesium 1.8            Imaging Results              X-Ray Chest 1 View (Final result)  Result time 11/10/24 13:56:53      Final result by Victor Manuel Willett MD (11/10/24 13:56:53)                   Impression:      As above      Electronically signed by: Victor Manuel Willett MD  Date:    11/10/2024  Time:    13:56               Narrative:    EXAMINATION:  XR CHEST 1 VIEW    CLINICAL HISTORY:  Pneumothorax, unspecified    TECHNIQUE:  Single frontal view of the chest was performed.    COMPARISON:  11/10/2024    FINDINGS:  Since the previous examination, left pleural drainage catheter has been placed with near complete re-expansion of the left hemithorax noting minimal apical pneumothorax remains.  There is subcutaneous emphysema along the left chest wall related to tube placement.  No additional detrimental changes since the previous exam.                                        X-Ray Chest AP Portable (Final result)  Result time 11/10/24 10:54:46      Final result by Vu Medina DO (11/10/24 10:54:46)                   Impression:      Left pneumothorax.    This report was flagged in Epic as abnormal.    Dr. Medina discussed critical findings with ROCÍO Neves by telephone at 10:53 on 11/10/2024.      Electronically signed by: Vu Medina  Date:    11/10/2024  Time:    10:54               Narrative:    EXAMINATION:  XR CHEST AP PORTABLE    CLINICAL HISTORY:  Chest Pain;    TECHNIQUE:  Single frontal view of the chest was performed.    COMPARISON:  None    FINDINGS:  Small to moderate left pneumothorax.  There is no evidence of mediastinal shift.  The lungs are clear.  No pleural fluid.  Osseous structures are  "intact.                                       Medications   sodium chloride 0.9% flush 10 mL (has no administration in time range)   naloxone 0.4 mg/mL injection 0.02 mg (has no administration in time range)   glucose chewable tablet 16 g (has no administration in time range)   glucose chewable tablet 24 g (has no administration in time range)   glucagon (human recombinant) injection 1 mg (has no administration in time range)   enoxaparin injection 40 mg (has no administration in time range)   acetaminophen tablet 650 mg (has no administration in time range)   oxyCODONE immediate release tablet 5 mg (has no administration in time range)   dextrose 10% bolus 125 mL 125 mL (has no administration in time range)   dextrose 10% bolus 250 mL 250 mL (has no administration in time range)   nicotine 14 mg/24 hr 1 patch (has no administration in time range)   LIDOcaine (PF) 10 mg/ml (1%) injection 100 mg (100 mg Infiltration Given 11/10/24 1308)   midazolam (PF) (VERSED) 1 mg/mL injection 1 mg (1 mg Intravenous Given 11/10/24 1231)   morphine injection 6 mg (6 mg Intravenous Given 11/10/24 1255)     Medical Decision Making  Patient presents with concern of left upper chest pain that radiates towards neck and shoulder that began yesterday afternoon.  Pain has been constant.  Denying shortness of breath.  Does report "smoker's cough" at baseline.  Afebrile.  Patient is anxious on exam but otherwise in no distress    DDx:  Including but not limited to ACS, CAD, STEMI, NSTEMI, angina, PE, pneumonia, pneumothorax, pleural effusion, musculoskeletal, costochondritis, pleurisy, anxiety, stress    Amount and/or Complexity of Data Reviewed  Labs: ordered.  Radiology: ordered.              Attending Attestation:         Attending Critical Care:   Critical Care Times:   Direct Patient Care (initial evaluation, reassessments, and time considering the case)................................................................15 minutes. "   Additional History from reviewing old medical records or taking additional history from the family, EMS, PCP, etc.......................5 minutes.   Ordering, Reviewing, and Interpreting Diagnostic Studies...............................................................................................................5 minutes.   Documentation..................................................................................................................................................................................5 minutes.   Consultation with other Physicians. .................................................................................................................................................5 minutes.   ==============================================================  Total Critical Care Time - exclusive of procedural time: 35 minutes.  ==============================================================  Critical care reasons: Pneumothorax.   The following critical care procedures were done by me (see procedure notes): chest tube placement (Chest tube placement performed by attending, Dr. Swan).   Critical care was time spent personally by me on the following activities: obtaining history from patient or relative, examination of patient, review of old charts, ordering lab, x-rays, and/or EKG, development of treatment plan with patient or relative, ordering and performing treatments and interventions, evaluation of patient's response to treatment, discussion with consultants and re-evaluation of patient's conition.   Critical Care Condition: potentially life-threatening           ED Course as of 11/10/24 1522   Sun Nov 10, 2024   1039 CBC auto differential(!)  Grossly unremarkable.  No elevation WBC [KS]   1039 Comprehensive metabolic panel(!)  Grossly unremarkable.  Creatinine WNL [KS]   1039 Magnesium  WNL [KS]   1039 BNP  WNL [KS]   1048 Troponin I #1  WNL [KS]   1048 D dimer, quantitative  WNL [KS]    1128 X-Ray Chest AP Portable(!)  Phone call received from Radiology, Dr. Medina, with concern of left-sided pneumothorax.  Does not appear to be tension pneumo. [KS]   1128 Order placed for non-rebreather.  Patient otherwise continues to appear stable [KS]   1301 Dr. Swan at bedside.  Pigtail placed [KS]   1517 X-Ray Chest 1 View  Repeat chest x-ray per Radiology review showing left pleural drainage catheter has been placed with near complete re-expansion of the left hemithorax [KS]   1518 Patient continues to rest comfortably.  Patient was discussed with [facility] internal medicine team, who has accepted patient for admission. [KS]   1518 Patient was discussed with and seen by attending, Dr. Swan, who agrees with ED course and dispo [KS]      ED Course User Index  [KS] Braden Neves PA-C                             Clinical Impression:  Final diagnoses:  [R07.9] Chest pain  [J93.9] Pneumothorax on left (Primary)          ED Disposition Condition    Admit Stable                Braden Neves PA-C  11/10/24 5842

## 2024-11-10 NOTE — PHARMACY MED REC
"    [facility]           Pharmacy  Admission Medication History     The home medication history was taken by Malena Hitchcock.      Medication history obtained from Medications listed below were obtained from: Patient/family.    Based on information gathered for medication list, you may go to "Admission" then "Reconcile Home Medications" tabs to review and/or act upon those items.     The home medication list has been updated by the Pharmacy department.   Please read ALL comments highlighted in yellow.   Please address this information as you see fit.    Feel free to contact us if you have any questions or require assistance.    The medications listed below were removed from the home medication list.  Please reorder if appropriate:    Patient reports NOT TAKING the following medication(s):  Augmentin 875-125 mg tab    No current facility-administered medications on file prior to encounter.     Current Outpatient Medications on File Prior to Encounter   Medication Sig Dispense Refill    naproxen sodium (ALEVE ORAL) Take 1-2 tablets by mouth as needed.         Please address this information as you see fit.  Feel free to contact us if you have any questions or require assistance.    Malena Hitchcock  [phone]              .          "

## 2024-11-10 NOTE — CONSULTS
[facility] Consult Note    Primary Attending Physician: Dr. Gabriel  Consultant Attending: Derrick Meadows MD  Consultant Fellow: Cal Escobar MD    Reason for Consult:     Pneumothorax s/p pigtail catheter placement    Subjective:      History of Present Illness:  Christin Luna is a 64 y.o.  female who  has no past medical history on file.. The patient presented to the [facility] on 11/10/2024 with a primary complaint of left sided chest pain/left shoulder pain/left sided neck pain that started yesterday afternoon.  The pt describes the feeling as heaviness.  No N/V.   In the ED, a CXR showed a Left-sided pneumothorax and a 14F pigtail catheter was placed by ED attending.   Repeat CXR showed re-expansion of L lung and some subQ emphysema.   Vital signs and Labs mostly within normal limits.     No previous history of PTX. No chest trauma. Reports some increased cough over the last few days but only clear secretions. Some chills at home, but no fevers. No recent airplane travel. Symptoms started suddenly around 3pm in the form of  L shoulder pain.  Current smoker and has smoked for decades but no previous diagnosis of COPD/Emphysema.        Pulmonology consulted to assist  with pleural catheter/pneumothorax management.      Past Medical History:  History reviewed. No pertinent past medical history.    Past Surgical History:  History reviewed. No pertinent surgical history.    Allergies:  Review of patient's allergies indicates:  No Known Allergies    Medications:   In-Hospital Scheduled Medications:     In-Hospital PRN Medications:     In-Hospital IV Infusion Medications:     Home Medications:  Prior to Admission medications    Medication Sig Start Date End Date Taking? Authorizing Provider   naproxen sodium (ALEVE ORAL) Take 1-2 tablets by mouth as needed.   Yes Provider, Historical   amoxicillin-clavulanate 875-125mg (AUGMENTIN) 875-125 mg per tablet Take 1 tablet by mouth 2 (two)  "times daily. 6/10/24 11/10/24  Marlyn Benton NP       Family History:  No family history on file.    Social History:  Social History     Tobacco Use    Smoking status: Every Day     Current packs/day: 1.00     Average packs/day: 1 pack/day for 34.9 years (34.9 ttl pk-yrs)     Types: Cigarettes     Start date: 1990    Smokeless tobacco: Never   Substance Use Topics    Alcohol use: Never    Drug use: Not Currently       Review of Systems:  Pertinent items are noted in HPI. All other systems are reviewed and are negative.     Objective:   Last 24 Hour Vital Signs:  BP  Min: 135/63  Max: 179/83  Temp  Av °F (36.7 °C)  Min: 98 °F (36.7 °C)  Max: 98 °F (36.7 °C)  Pulse  Av.7  Min: 49  Max: 75  Resp  Av.4  Min: 17  Max: 40  SpO2  Av.6 %  Min: 93 %  Max: 100 %  Height  Av' 3" (160 cm)  Min: 5' 3" (160 cm)  Max: 5' 3" (160 cm)  Weight  Av.4 kg (120 lb)  Min: 54.4 kg (120 lb)  Max: 54.4 kg (120 lb)  No intake/output data recorded.    Physical Examination:  Gen: Alert, in mild distress due to pain.   HEENT: NC/AT, PERRLA, anicteric sclerae, TMs clear, nasal mucosa pink, OP clear   Neck: Supple, no LAD, no thyromegaly   CV: RRR, no m/r/g, pulses 2+ symmetric   Resp: CTA bilat, no wheezes/r/r. There is a Left lateral Pleural catheter with adequate tidaling and intermittent air leak.   Abd: Soft, NT/ND, BS present, no HSM   MSK: FROM, no deformities, no tenderness   Neuro: A&Ox3, CN II-XII intact, 5/5 strength, reflexes 2+, sensation intact Skin: Warm, dry, no r/l        Laboratory:  Trended Lab Data:  Recent Labs     11/10/24  1011   WBC 6.19   HGB 15.3   HCT 44.9         K 3.9      CO2 22*   BUN 11   CREATININE 0.7   *   BILITOT 0.5   AST 15   ALT 12   ALKPHOS 106   CALCIUM 9.4   ALBUMIN 4.0   PROT 7.0   MG 1.8       Cardiac:   Recent Labs   Lab 11/10/24  1011   TROPONINI <0.006   BNP 51       Urinalysis: No results found for: "LABURIN", "COLORU", "CLARITYU", " ""SPECGRAV", "LABSPEC", "NITRITE", "PROTEINUR", "GLUCOSEU", "KETONESU", "UROBILINOGEN", "BILIRUBINUR", "BLOODU"    Microbiology:  Microbiology Results (last 7 days)       ** No results found for the last 168 hours. **            Radiology:  CXR from presentation with L moderately sized pneumothorax. Repeat XR post 14F pleural catheter placement with re-expansion of the L lung and some subcutaneous emphysema.     I have personally reviewed the above labs and imaging.       Assessment:     Christin Luna is a 64 y.o. female with:  Patient Active Problem List    Diagnosis Date Noted    Smoker 03/23/2023        Plan:     #Secondary Spontaneous pneumothorax  # Intermittent air leak  # Current Smoker/ tobacco use disorder  - s/p 14F pleural catheter placement in the ED. Reviewed repeat XR with adequate lung re-expansion   No identifiable triggering event. Some vigorous coughing lately per patient but no chest trauma/falls. No air travels or hx of chest surgeries.  - Suspect ruptured bleb from emphysema as most likely etiology given smoking history and increased lung volumes on CXR, but will need CT to assess lung parenchyma.  - No clear signs of infection.     Recommendations:  - Keep chest tube to suction -20 cmH20 overnight.  - Obtain non-contrast chest CT.   - Pain management per primary  - Can consider switching to water seal tomorrow during the day to assess if PTX expands.  - Smoking cessation counseling on discharge.   - Consider Nicotine patch if patient would like it.  - Will check RIDP PCR to rule out viral infection given patient's cough as of lately.       Patient seen by Dr. Meadows.     Thank you for allowing us to participate in the care of this patient. Please contact me if you have any questions regarding this consult.    Cal Escobar MD  [facility] Fellow    "

## 2024-11-11 LAB
ADENOVIRUS: NOT DETECTED
ALBUMIN SERPL BCP-MCNC: 3.7 G/DL (ref 3.5–5.2)
ALP SERPL-CCNC: 103 U/L (ref 40–150)
ALT SERPL W/O P-5'-P-CCNC: 12 U/L (ref 10–44)
ANION GAP SERPL CALC-SCNC: 9 MMOL/L (ref 8–16)
AST SERPL-CCNC: 16 U/L (ref 10–40)
BASOPHILS # BLD AUTO: 0.05 K/UL (ref 0–0.2)
BASOPHILS NFR BLD: 0.6 % (ref 0–1.9)
BILIRUB SERPL-MCNC: 0.5 MG/DL (ref 0.1–1)
BORDETELLA PARAPERTUSSIS (IS1001): NOT DETECTED
BORDETELLA PERTUSSIS (PTXP): NOT DETECTED
BUN SERPL-MCNC: 13 MG/DL (ref 8–23)
CALCIUM SERPL-MCNC: 9.2 MG/DL (ref 8.7–10.5)
CHLAMYDIA PNEUMONIAE: NOT DETECTED
CHLORIDE SERPL-SCNC: 105 MMOL/L (ref 95–110)
CHOLEST SERPL-MCNC: 228 MG/DL (ref 120–199)
CHOLEST/HDLC SERPL: 3.9 {RATIO} (ref 2–5)
CO2 SERPL-SCNC: 23 MMOL/L (ref 23–29)
CORONAVIRUS 229E, COMMON COLD VIRUS: NOT DETECTED
CORONAVIRUS HKU1, COMMON COLD VIRUS: NOT DETECTED
CORONAVIRUS NL63, COMMON COLD VIRUS: NOT DETECTED
CORONAVIRUS OC43, COMMON COLD VIRUS: NOT DETECTED
CREAT SERPL-MCNC: 0.7 MG/DL (ref 0.5–1.4)
DIFFERENTIAL METHOD BLD: ABNORMAL
EOSINOPHIL # BLD AUTO: 0.1 K/UL (ref 0–0.5)
EOSINOPHIL NFR BLD: 0.8 % (ref 0–8)
ERYTHROCYTE [DISTWIDTH] IN BLOOD BY AUTOMATED COUNT: 13.3 % (ref 11.5–14.5)
EST. GFR  (NO RACE VARIABLE): >60 ML/MIN/1.73 M^2
ESTIMATED AVG GLUCOSE: 108 MG/DL (ref 68–131)
FLUBV RNA NPH QL NAA+NON-PROBE: NOT DETECTED
GLUCOSE SERPL-MCNC: 93 MG/DL (ref 70–110)
HBA1C MFR BLD: 5.4 % (ref 4–5.6)
HCT VFR BLD AUTO: 41.8 % (ref 37–48.5)
HDLC SERPL-MCNC: 58 MG/DL (ref 40–75)
HDLC SERPL: 25.4 % (ref 20–50)
HGB BLD-MCNC: 14.1 G/DL (ref 12–16)
HPIV1 RNA NPH QL NAA+NON-PROBE: NOT DETECTED
HPIV2 RNA NPH QL NAA+NON-PROBE: NOT DETECTED
HPIV3 RNA NPH QL NAA+NON-PROBE: NOT DETECTED
HPIV4 RNA NPH QL NAA+NON-PROBE: NOT DETECTED
HUMAN METAPNEUMOVIRUS: NOT DETECTED
IMM GRANULOCYTES # BLD AUTO: 0.02 K/UL (ref 0–0.04)
IMM GRANULOCYTES NFR BLD AUTO: 0.2 % (ref 0–0.5)
INFLUENZA A (SUBTYPES H1,H1-2009,H3): NOT DETECTED
LDLC SERPL CALC-MCNC: 141.8 MG/DL (ref 63–159)
LYMPHOCYTES # BLD AUTO: 2.9 K/UL (ref 1–4.8)
LYMPHOCYTES NFR BLD: 33.8 % (ref 18–48)
MAGNESIUM SERPL-MCNC: 1.9 MG/DL (ref 1.6–2.6)
MCH RBC QN AUTO: 31.8 PG (ref 27–31)
MCHC RBC AUTO-ENTMCNC: 33.7 G/DL (ref 32–36)
MCV RBC AUTO: 94 FL (ref 82–98)
MONOCYTES # BLD AUTO: 0.6 K/UL (ref 0.3–1)
MONOCYTES NFR BLD: 6.9 % (ref 4–15)
MYCOPLASMA PNEUMONIAE: NOT DETECTED
NEUTROPHILS # BLD AUTO: 5 K/UL (ref 1.8–7.7)
NEUTROPHILS NFR BLD: 57.7 % (ref 38–73)
NONHDLC SERPL-MCNC: 170 MG/DL
NRBC BLD-RTO: 0 /100 WBC
PHOSPHATE SERPL-MCNC: 3.2 MG/DL (ref 2.7–4.5)
PLATELET # BLD AUTO: 223 K/UL (ref 150–450)
PMV BLD AUTO: 11.1 FL (ref 9.2–12.9)
POTASSIUM SERPL-SCNC: 4.5 MMOL/L (ref 3.5–5.1)
PROT SERPL-MCNC: 6.6 G/DL (ref 6–8.4)
RBC # BLD AUTO: 4.44 M/UL (ref 4–5.4)
RESPIRATORY INFECTION PANEL SOURCE: NORMAL
RSV RNA NPH QL NAA+NON-PROBE: NOT DETECTED
RV+EV RNA NPH QL NAA+NON-PROBE: NOT DETECTED
SARS-COV-2 RNA RESP QL NAA+PROBE: NOT DETECTED
SODIUM SERPL-SCNC: 137 MMOL/L (ref 136–145)
TRIGL SERPL-MCNC: 141 MG/DL (ref 30–150)
WBC # BLD AUTO: 8.67 K/UL (ref 3.9–12.7)

## 2024-11-11 PROCEDURE — 85025 COMPLETE CBC W/AUTO DIFF WBC: CPT

## 2024-11-11 PROCEDURE — 84100 ASSAY OF PHOSPHORUS: CPT

## 2024-11-11 PROCEDURE — 63600175 PHARM REV CODE 636 W HCPCS

## 2024-11-11 PROCEDURE — 21400001 HC TELEMETRY ROOM

## 2024-11-11 PROCEDURE — 99900035 HC TECH TIME PER 15 MIN (STAT)

## 2024-11-11 PROCEDURE — 80061 LIPID PANEL: CPT

## 2024-11-11 PROCEDURE — S4991 NICOTINE PATCH NONLEGEND: HCPCS | Performed by: STUDENT IN AN ORGANIZED HEALTH CARE EDUCATION/TRAINING PROGRAM

## 2024-11-11 PROCEDURE — 80053 COMPREHEN METABOLIC PANEL: CPT

## 2024-11-11 PROCEDURE — 25000003 PHARM REV CODE 250

## 2024-11-11 PROCEDURE — 27000221 HC OXYGEN, UP TO 24 HOURS

## 2024-11-11 PROCEDURE — 94761 N-INVAS EAR/PLS OXIMETRY MLT: CPT

## 2024-11-11 PROCEDURE — 83036 HEMOGLOBIN GLYCOSYLATED A1C: CPT

## 2024-11-11 PROCEDURE — 83735 ASSAY OF MAGNESIUM: CPT

## 2024-11-11 PROCEDURE — 25000003 PHARM REV CODE 250: Performed by: STUDENT IN AN ORGANIZED HEALTH CARE EDUCATION/TRAINING PROGRAM

## 2024-11-11 RX ORDER — DOCUSATE SODIUM 100 MG/1
100 CAPSULE, LIQUID FILLED ORAL DAILY
Status: DISCONTINUED | OUTPATIENT
Start: 2024-11-11 | End: 2024-11-12 | Stop reason: HOSPADM

## 2024-11-11 RX ORDER — POLYETHYLENE GLYCOL 3350 17 G/17G
17 POWDER, FOR SOLUTION ORAL DAILY
Status: DISCONTINUED | OUTPATIENT
Start: 2024-11-11 | End: 2024-11-12 | Stop reason: HOSPADM

## 2024-11-11 RX ADMIN — DOCUSATE SODIUM 100 MG: 100 CAPSULE, LIQUID FILLED ORAL at 11:11

## 2024-11-11 RX ADMIN — SIMETHICONE 125 MG: 125 TABLET, CHEWABLE ORAL at 11:11

## 2024-11-11 RX ADMIN — ENOXAPARIN SODIUM 40 MG: 40 INJECTION SUBCUTANEOUS at 04:11

## 2024-11-11 RX ADMIN — OXYCODONE 5 MG: 5 TABLET ORAL at 02:11

## 2024-11-11 RX ADMIN — NICOTINE 1 PATCH: 14 PATCH, EXTENDED RELEASE TRANSDERMAL at 09:11

## 2024-11-11 RX ADMIN — OXYCODONE 5 MG: 5 TABLET ORAL at 09:11

## 2024-11-11 NOTE — PLAN OF CARE
CM met with pt at bedside to discuss discharge planning. 02 in use, Chest Tube present. She lives with spouse. Pt is independent with ADL's. Pt has no DME/HH services. Upon discharge, pts family member Heladio Beasley (Spouse) [phone] (Mobile)  will provide transport home. Pt made aware to contact CM with any questions or concerns. CM will continue to follow and assist with any discharge needs.     [place] - Med Surg  Initial Discharge Assessment       Primary Care Provider: Arthur Bauman MD    Admission Diagnosis: Pneumothorax on left [J93.9]  Chest pain [R07.9]    Admission Date: 11/10/2024  Expected Discharge Date:     Transition of Care Barriers: (P) None    Payor: BLUE CROSS BLUE [employer] / Plan: BLUE CONNECT / Product Type: HMO /     Extended Emergency Contact Information  Primary Emergency Contact: Heladio beasley  Mobile Phone: [phone]  Relation: Spouse    Discharge Plan A: (P) Home, Home with family         [id number] [place] - [address] AT [facility]  [address]  [place] [address]  Phone: [phone] Fax: [phone]      Initial Assessment (most recent)       Adult Discharge Assessment - 11/11/24 1520          Discharge Assessment    Assessment Type Discharge Planning Assessment     Confirmed/corrected address, phone number and insurance Yes     Confirmed Demographics Correct on Facesheet     Source of Information patient     When was your last doctors appointment? 06/18/24 (P)      Communicated JOSE ELIAS with patient/caregiver Date not available/Unable to determine (P)      Reason For Admission Pneumothorax on left (P)      People in Home spouse (P)      Do you expect to return to your current living situation? Yes (P)      Do you have help at home or someone to help you manage your care at home? Yes (P)      Who are your caregiver(s) and their phone number(s)? Heladio beasley (Spouse)  [phone] (Mobile) (P)      Prior to hospitilization cognitive  status: Alert/Oriented (P)      Current cognitive status: Alert/Oriented (P)      Walking or Climbing Stairs Difficulty no (P)      Dressing/Bathing Difficulty no (P)      Equipment Currently Used at Home none (P)      Readmission within 30 days? No (P)      Patient currently being followed by outpatient case management? No (P)      Do you currently have service(s) that help you manage your care at home? No (P)      Do you take prescription medications? No (P)      Do you have prescription coverage? Yes (P)      Coverage BCBS (P)      Do you have any problems affording any of your prescribed medications? No (P)      Who is going to help you get home at discharge? Heladio beasley (Spouse) [phone] (Mobile) (P)      How do you get to doctors appointments? car, drives self (P)      Are you on dialysis? No (P)      Do you take coumadin? No (P)      Discharge Plan A Home;Home with family (P)      DME Needed Upon Discharge  none (P)      Discharge Plan discussed with: Patient;Spouse/sig other (P)      Name(s) and Number(s) Heladio beasley (Spouse) [phone] (Mobile) (P)      Transition of Care Barriers None (P)         Physical Activity    On average, how many days per week do you engage in moderate to strenuous exercise (like a brisk walk)? 6 days (P)      On average, how many minutes do you engage in exercise at this level? 40 min (P)         Financial Resource Strain    How hard is it for you to pay for the very basics like food, housing, medical care, and heating? Not hard at all (P)         Housing Stability    In the last 12 months, was there a time when you were not able to pay the mortgage or rent on time? No (P)      At any time in the past 12 months, were you homeless or living in a shelter (including now)? No (P)         Transportation Needs    Has the lack of transportation kept you from medical appointments, meetings, work or from getting things needed for daily living? No (P)         Food Insecurity     Within the past 12 months, you worried that your food would run out before you got the money to buy more. Never true (P)      Within the past 12 months, the food you bought just didn't last and you didn't have money to get more. Never true (P)         Stress    Do you feel stress - tense, restless, nervous, or anxious, or unable to sleep at night because your mind is troubled all the time - these days? Only a little (P)         Social Isolation    How often do you feel lonely or isolated from those around you?  Never (P)         Alcohol Use    Q1: How often do you have a drink containing alcohol? Never (P)      Q2: How many drinks containing alcohol do you have on a typical day when you are drinking? Patient does not drink (P)      Q3: How often do you have six or more drinks on one occasion? Never (P)         Utilities    In the past 12 months has the electric, gas, oil, or water company threatened to shut off services in your home? Yes (P)         Health Literacy    How often do you need to have someone help you when you read instructions, pamphlets, or other written material from your doctor or pharmacy? Never (P)         OTHER    Name(s) of People in Home Heladio beasley (Spouse) [phone] (Mobile) (P)

## 2024-11-11 NOTE — PROGRESS NOTES
"[facility] Medicine Progress Note    Primary Team: [facility] Hospitalist Team B  Attending Physician: Attila Gabriel MD  Resident: Eddie  Intern: Christina    Subjective:      No acute events overnight. Patient resting bed comfortably. No overt increased work of breathing. States she feels much better compared to yesterday and has no current issues breathing.      Objective:     Last 24 Hour Vital Signs:  BP  Min: 120/62  Max: 179/83  Temp  Av °F (36.7 °C)  Min: 97.8 °F (36.6 °C)  Max: 98.2 °F (36.8 °C)  Pulse  Av.5  Min: 45  Max: 75  Resp  Av.4  Min: 17  Max: 40  SpO2  Av %  Min: 93 %  Max: 100 %  Height  Av' 3" (160 cm)  Min: 5' 3" (160 cm)  Max: 5' 3" (160 cm)  Weight  Av.5 kg (115 lb 13.4 oz)  Min: 51.4 kg (113 lb 5.1 oz)  Max: 54.4 kg (120 lb)  I/O last 3 completed shifts:  In: 300 [P.O.:300]  Out: -     Physical Examination:  Physical Exam  Vitals reviewed.   Constitutional:       General: She is not in acute distress.     Appearance: She is not ill-appearing.   HENT:      Head: Normocephalic.      Mouth/Throat:      Mouth: Mucous membranes are moist.      Pharynx: Oropharynx is clear.   Eyes:      Extraocular Movements: Extraocular movements intact.   Cardiovascular:      Rate and Rhythm: Normal rate and regular rhythm.      Pulses: Normal pulses.      Heart sounds: Normal heart sounds.   Pulmonary:      Effort: Pulmonary effort is normal.      Comments: Pigtail placed on left upper flank  Abdominal:      General: Abdomen is flat. There is no distension.      Tenderness: There is no abdominal tenderness.   Skin:     General: Skin is warm and dry.   Neurological:      General: No focal deficit present.      Mental Status: She is alert.   Psychiatric:         Mood and Affect: Mood normal.          Laboratory:  Laboratory Data Reviewed: yes      Other Results:  EKG (my interpretation): Sinus rhythm with frequent PVCs     Radiology Data Reviewed: yes  Pertinent Findings:  Chest X-ray " (11/10/24@ 10:54): Small to moderate left pneumothorax.  There is no evidence of mediastinal shift.  The lungs are clear.  No pleural fluid.  Osseous structures are intact.   Chest X-ray (11/10/24@ 13:56): Since the previous examination, left pleural drainage catheter has been placed with near complete re-expansion of the left hemithorax noting minimal apical pneumothorax remains.  There is subcutaneous emphysema along the left chest wall related to tube placement.  No additional detrimental changes since the previous exam.   CT Chest without contrast (11/10/2024): Left-sided pleural drainage catheter in appropriate position.  Small residual left pneumothorax. Aerated portions of the lungs demonstrates diffuse centrilobular and paraseptal emphysematous change, fibro nodular biapical scarring, and bibasilar atelectasis.  No large region consolidation or pleural effusion. Scattered pulmonary micro nodules.  For multiple solid nodules all <6 mm, Fleischner Society 2017 guidelines recommend no routine follow up for a low risk patient, or follow up with non-contrast chest CT at 12 months after discovery in a high risk patient. Diffuse subcutaneous emphysema throughout the left axillary and thoracic soft tissues.    Current Medications:     Infusions:       Scheduled:   enoxparin  40 mg Subcutaneous Daily    nicotine  1 patch Transdermal Daily        PRN:    Current Facility-Administered Medications:     acetaminophen, 650 mg, Oral, Q4H PRN    dextrose 10%, 12.5 g, Intravenous, PRN    dextrose 10%, 25 g, Intravenous, PRN    glucagon (human recombinant), 1 mg, Intramuscular, PRN    glucose, 16 g, Oral, PRN    glucose, 24 g, Oral, PRN    naloxone, 0.02 mg, Intravenous, PRN    oxyCODONE, 5 mg, Oral, Q4H PRN    simethicone, 125 mg, Oral, Q6H PRN    sodium chloride 0.9%, 10 mL, Intravenous, Q12H PRN      Lines and Day Number of Therapy:  14F pleural catheter, left side: 11/10/24- current    Assessment:     #Left pneumothorax,  spontaneous small to moderate  #Suspect ruptured bleb from emphysema given smoking status and CT chest findings   -Patient with new left sided chest pain with radiation to left neck. Associated with increased pain upon deep inspiration  -Troponin <0.006, BNP wnl   -CXR significant for left sided pneumothorax  -Pigtail (L) placed in ER with near complete resolution of pneumothorax per repeat chest X-ray   -Pulmonology consulted,appreciate recs  -CT Chest without contrast showed diffuse subcutaneous emphysema   -Continue pigtail with suction for now  -Pain control with oxycodone 5 mg q6h PRN and acetaminophen 650 mg q4h PRN     #Chronic cough in association with significant smoking history   #Likely undiagnosed COPD  -will need outpatient pulmonology follow up   -CT Chest without contrast showed diffuse subcutaneous emphysema   -PFTs outpatient      #Smoking history   -patient with significant smoking history   -nicotine patch ordered while inpatient   -Smoking cessation counseling      #Healthcare Maintenance   -A1c, lipid panel ordered      Code: Full  Diet: Normal Adult  DVT Ppx: enoxaparin   Dispo: pending pneumothorax management and symptom resolution        Carol Vasquez MD  [facility], PGY-1  [facility] Hospital Medicine Team B    [facility] Medicine Hospitalist Pager numbers:   [facility] Hospitalist Medicine Team A (Annette/Shalom): [phone]  [facility] Hospitalist Medicine Team B (Nery/Toshia):  [phone]

## 2024-11-11 NOTE — PLAN OF CARE
Patient received on   3 Lpm NC with SpO2   97 %. Pt with no apparent distress noted. Will continue to monitor.

## 2024-11-11 NOTE — PLAN OF CARE
11/10/24 1855   Admission   Initial VN Admission Questions Complete   Shift   Virtual Nurse - Rounding Complete   Virtual Nurse - Patient Verbalized Approval Of Camera Use;VN Rounding   Type of Frequent Check   Type Patient Rounds   Safety/Activity   Patient Rounds call light in patient/parent reach;visualized patient   Safety Promotion/Fall Prevention assistive device/personal item within reach;family to remain at bedside   Safety Precautions emergency equipment at bedside   Activity Management Rolling - L1;Arm raise - L1   Activity Assistance Provided assistance, 1 person   Positioning   Body Position neutral body alignment;neutral head position   Pain/Comfort/Sleep   Sleep/Rest/Relaxation no problem identified

## 2024-11-11 NOTE — PROGRESS NOTES
"[facility] Pulmonary & Critical Care Medicine Progress Note    Subjective:      Overnight, per reports chest tube stopped bubbling. This morning, good breath sounds, pt feeling well. CXR without ptx, placed to water seal. She does have some soreness at the site of the chest tube placement but otherwise no pain.      Objective:     Last 24 Hour Vital Signs:  BP  Min: 120/62  Max: 179/83  Temp  Av °F (36.7 °C)  Min: 97.8 °F (36.6 °C)  Max: 98.2 °F (36.8 °C)  Pulse  Av.3  Min: 45  Max: 75  Resp  Av.3  Min: 17  Max: 40  SpO2  Av %  Min: 93 %  Max: 100 %  Height  Av' 3" (160 cm)  Min: 5' 3" (160 cm)  Max: 5' 3" (160 cm)  Weight  Av.5 kg (115 lb 13.4 oz)  Min: 51.4 kg (113 lb 5.1 oz)  Max: 54.4 kg (120 lb)  I/O last 3 completed shifts:  In: 300 [P.O.:300]  Out: -     Physical Examination:  Physical Exam  Vitals and nursing note reviewed.   Constitutional:       General: She is not in acute distress.     Appearance: She is not toxic-appearing.      Comments: Laying in bed, able to speak in full sentences.    HENT:      Head: Normocephalic.      Nose: Nose normal.      Mouth/Throat:      Mouth: Mucous membranes are moist.   Eyes:      General:         Right eye: No discharge.         Left eye: No discharge.      Extraocular Movements: Extraocular movements intact.      Conjunctiva/sclera: Conjunctivae normal.   Cardiovascular:      Rate and Rhythm: Normal rate.   Pulmonary:      Effort: Pulmonary effort is normal. No respiratory distress.      Breath sounds: Normal breath sounds. No wheezing.   Abdominal:      General: There is no distension.      Palpations: Abdomen is soft.   Musculoskeletal:      Right lower leg: No edema.      Left lower leg: No edema.   Skin:     General: Skin is warm and dry.      Capillary Refill: Capillary refill takes less than 2 seconds.   Neurological:      General: No focal deficit present.      Mental Status: She is alert and oriented to person, place, and time. " "        Laboratory:  Trended Lab Data:  Recent Labs     11/10/24  1011 11/11/24  0439   WBC 6.19 8.67   HGB 15.3 14.1   HCT 44.9 41.8    223    137   K 3.9 4.5    105   CO2 22* 23   BUN 11 13   CREATININE 0.7 0.7   * 93   BILITOT 0.5 0.5   AST 15 16   ALT 12 12   ALKPHOS 106 103   CALCIUM 9.4 9.2   ALBUMIN 4.0 3.7   PROT 7.0 6.6   MG 1.8 1.9   PHOS  --  3.2       Cardiac:   Recent Labs   Lab 11/10/24  1011   TROPONINI <0.006   BNP 51       Urinalysis: No results found for: "LABURIN", "COLORU", "CLARITYU", "SPECGRAV", "LABSPEC", "NITRITE", "PROTEINUR", "GLUCOSEU", "KETONESU", "UROBILINOGEN", "BILIRUBINUR", "BLOODU"    Microbiology:  Microbiology Results (last 7 days)       Procedure Component Value Units Date/Time    Respiratory Infection Panel (PCR), Nasopharyngeal [[id number]] Collected: 11/10/24 7579    Order Status: Completed Specimen: Nasopharyngeal Swab Updated: 11/11/24 0254     Respiratory Infection Panel Source NP Swab     Adenovirus Not Detected     Coronavirus 229E, Common Cold Virus Not Detected     Coronavirus HKU1, Common Cold Virus Not Detected     Coronavirus NL63, Common Cold Virus Not Detected     Coronavirus OC43, Common Cold Virus Not Detected     Comment: The Coronavirus strains detected in this test cause the common cold.  These strains are not the COVID-19 (novel Coronavirus)strain   associated with the respiratory disease outbreak.          SARS-CoV2 (COVID-19) Qualitative PCR Not Detected     Human Metapneumovirus Not Detected     Human Rhinovirus/Enterovirus Not Detected     Influenza A (subtypes H1, H1-2009,H3) Not Detected     Influenza B Not Detected     Parainfluenza Virus 1 Not Detected     Parainfluenza Virus 2 Not Detected     Parainfluenza Virus 3 Not Detected     Parainfluenza Virus 4 Not Detected     Respiratory Syncytial Virus Not Detected     Bordetella Parapertussis (PA2098) Not Detected     Bordetella pertussis (ptxP) Not Detected     Chlamydia " pneumoniae Not Detected     Mycoplasma pneumoniae Not Detected    Narrative:      Assay not valid for lower respiratory specimens, alternate  testing required.            Radiology:  CT Chest 11/10: Impression:  Left-sided pleural drainage catheter in appropriate position.  Small residual left pneumothorax.  Aerated portions of the lungs demonstrates diffuse centrilobular and paraseptal emphysematous change, fibro nodular biapical scarring, and bibasilar atelectasis.  No large region consolidation or pleural effusion.  Scattered pulmonary micro nodules.  For multiple solid nodules all <6 mm, Fleischner Society 2017 guidelines recommend no routine follow up for a low risk patient, or follow up with non-contrast chest CT at 12 months after discovery in a high risk patient.  Diffuse subcutaneous emphysema throughout the left axillary and thoracic soft tissues.    I have personally reviewed the above labs and imaging.    Assessment:     Christin Luna is a 64 y.o.female with h/o current tobacco use and likely COPD/emphysema who presented with secondary spontaneous PTX now s/p chest tube.      Plan:     #Secondary Spontaneous pneumothorax  # Intermittent air leak  # Current Smoker/ tobacco use disorder  - s/p 14F pleural catheter placement in the ED with subsequent lung expansion   - No identifiable triggering event. Some vigorous coughing lately per patient but no chest trauma/falls. No air travels or hx of chest surgeries.  - Suspect ruptured bleb from emphysema, significant emphysema seen on CT chest worse in apices   - No clear signs of infection, resp panel negative      Recommendations:  - Chest tube transitioned to water seal, will repeat CXR at 2pm and likely plan to leave to water seal for 24 hours with clamping trial tomorrow if pt tolerates   - Pain management per primary  - Smoking cessation counseling on discharge.   - Consider Nicotine patch if patient would like it.  - Pt will need outpt fu with  pulmonology on discharge     Thank you for allowing us to participate in the care of this patient, we will continue to follow. Please let us know if there are questions or concerns. We will continue to follow.       Kenya Thayer MD  [facility] Pulmonary & Critical Care Medicine Fellow

## 2024-11-11 NOTE — NURSING
Rapid Response Nurse Follow-up Note     Followed up with patient for proactive rounding.   No acute issues at this time. Pt in bed eating breakfast w/ SO at bedside. Pt awake and alert on 3L NC. Bradycardic on monitor, but VSS. Endorses some discomfort at chest tube site but relieved w/ PRN pain medications. CXR obtained this morning. All question and concerns answered. Reviewed plan of care with charge RNOscar .   Team will continue to follow.  Please call Rapid Response RN, Antonio Moura RN with any questions or concerns at [phone].

## 2024-11-11 NOTE — NURSING
Rapid Response Nurse Follow-up Note     Followed up with patient for proactive rounding.   No acute issues at this time. Pt in bed drinking water. Complaining of mild tenderness at chest tube inseertion site, but otherwise feels fine. Minimal amount of blood noted in chest tube.  Team will continue to follow.  Please call Rapid Response RN, Humberto Zamora RN with any questions or concerns at [phone].

## 2024-11-11 NOTE — SUBJECTIVE & OBJECTIVE
"Interval History/Significant Events: ***    Review of Systems  Objective:     Vital Signs (Most Recent):  Temp: 98 °F (36.7 °C) (11/11/24 0732)  Pulse: (!) 52 (11/11/24 0732)  Resp: 18 (11/11/24 0732)  BP: 131/73 (11/11/24 0732)  SpO2: 98 % (11/11/24 0732) Vital Signs (24h Range):  Temp:  [97.8 °F (36.6 °C)-98.2 °F (36.8 °C)] 98 °F (36.7 °C)  Pulse:  [45-75] 52  Resp:  [17-40] 18  SpO2:  [93 %-100 %] 98 %  BP: (120-179)/(62-87) 131/73   Weight: 51.8 kg (114 lb 3.2 oz)  Body mass index is 20.23 kg/m².      Intake/Output Summary (Last 24 hours) at 11/11/2024 0735  Last data filed at 11/11/2024 0433  Gross per 24 hour   Intake 300 ml   Output --   Net 300 ml          Physical Exam       Vents:     Lines/Drains/Airways       Drain  Duration                  Chest Tube 11/10/24 1330 Tube - 1 Left Midaxillary 14 Fr. <1 day              Peripheral Intravenous Line  Duration                  Peripheral IV - Single Lumen 11/10/24 1015 20 G Anterior;Left;Proximal Forearm <1 day                  Significant Labs:    CBC/Anemia Profile:  Recent Labs   Lab 11/10/24  1011 11/11/24  0439   WBC 6.19 8.67   HGB 15.3 14.1   HCT 44.9 41.8    223   MCV 93 94   RDW 13.2 13.3        Chemistries:  Recent Labs   Lab 11/10/24  1011 11/11/24  0439    137   K 3.9 4.5    105   CO2 22* 23   BUN 11 13   CREATININE 0.7 0.7   CALCIUM 9.4 9.2   ALBUMIN 4.0 3.7   PROT 7.0 6.6   BILITOT 0.5 0.5   ALKPHOS 106 103   ALT 12 12   AST 15 16   MG 1.8 1.9   PHOS  --  3.2       {Results:78024}    Significant Imaging:  {Imaging Review:50615::"I have reviewed all pertinent imaging results/findings within the past 24 hours."}  "

## 2024-11-11 NOTE — PLAN OF CARE
VIRTUAL NURSE: Pt arrived to unit. Permission received to turn camera to view patient. VIP model explained; Patient informed this VN will be working with bedside nurse and the rest of the care team.      Admission questions completed.  Plan of care reviewed with patient.  Educated patient on VTE and fall risk. Safety precautions in place. Call light within reach, side rails up x2.     Patient instucted to ask staff for assistance. Patient verbalized complete understanding.  Will continue to be available and intervene as needed.    Labs, notes, orders, and careplan reviewed.    Problem: Adult Inpatient Plan of Care  Goal: Plan of Care Review  Outcome: Progressing  Goal: Patient-Specific Goal (Individualized)  Outcome: Progressing

## 2024-11-11 NOTE — PLAN OF CARE
Plan of care reviewed with patient and family. All verbalized understanding. Medicated per MAR. Chest tube left in place per MD. Instructed to use call light for assistance. Call light in reach.

## 2024-11-11 NOTE — PLAN OF CARE
Pt resting in bed with eyes closed. Respirations even and unlabored. PRN oxycodone given for c/o pain. No c/o N/V. Medications administered per MAR. On 3L NC. Cardiac monitoring maintained, SB on monitor. L chest tube to wall suction at 20cm. Up to BSC with assist x1. Bed alarm set, side rails raised, and call light in reach.

## 2024-11-12 VITALS
HEART RATE: 70 BPM | TEMPERATURE: 99 F | WEIGHT: 111.31 LBS | HEIGHT: 63 IN | SYSTOLIC BLOOD PRESSURE: 123 MMHG | BODY MASS INDEX: 19.72 KG/M2 | RESPIRATION RATE: 18 BRPM | DIASTOLIC BLOOD PRESSURE: 79 MMHG | OXYGEN SATURATION: 94 %

## 2024-11-12 LAB
ALBUMIN SERPL BCP-MCNC: 3.8 G/DL (ref 3.5–5.2)
ALP SERPL-CCNC: 93 U/L (ref 40–150)
ALT SERPL W/O P-5'-P-CCNC: 12 U/L (ref 10–44)
ANION GAP SERPL CALC-SCNC: 10 MMOL/L (ref 8–16)
AST SERPL-CCNC: 15 U/L (ref 10–40)
BASOPHILS # BLD AUTO: 0.04 K/UL (ref 0–0.2)
BASOPHILS NFR BLD: 0.5 % (ref 0–1.9)
BILIRUB SERPL-MCNC: 0.5 MG/DL (ref 0.1–1)
BUN SERPL-MCNC: 14 MG/DL (ref 8–23)
CALCIUM SERPL-MCNC: 9.5 MG/DL (ref 8.7–10.5)
CHLORIDE SERPL-SCNC: 105 MMOL/L (ref 95–110)
CO2 SERPL-SCNC: 22 MMOL/L (ref 23–29)
CREAT SERPL-MCNC: 0.7 MG/DL (ref 0.5–1.4)
DIFFERENTIAL METHOD BLD: NORMAL
EOSINOPHIL # BLD AUTO: 0.2 K/UL (ref 0–0.5)
EOSINOPHIL NFR BLD: 2.3 % (ref 0–8)
ERYTHROCYTE [DISTWIDTH] IN BLOOD BY AUTOMATED COUNT: 13.2 % (ref 11.5–14.5)
EST. GFR  (NO RACE VARIABLE): >60 ML/MIN/1.73 M^2
GLUCOSE SERPL-MCNC: 89 MG/DL (ref 70–110)
HCT VFR BLD AUTO: 44.2 % (ref 37–48.5)
HGB BLD-MCNC: 14.7 G/DL (ref 12–16)
IMM GRANULOCYTES # BLD AUTO: 0.02 K/UL (ref 0–0.04)
IMM GRANULOCYTES NFR BLD AUTO: 0.3 % (ref 0–0.5)
LYMPHOCYTES # BLD AUTO: 3 K/UL (ref 1–4.8)
LYMPHOCYTES NFR BLD: 39.8 % (ref 18–48)
MAGNESIUM SERPL-MCNC: 2 MG/DL (ref 1.6–2.6)
MCH RBC QN AUTO: 30.8 PG (ref 27–31)
MCHC RBC AUTO-ENTMCNC: 33.3 G/DL (ref 32–36)
MCV RBC AUTO: 93 FL (ref 82–98)
MONOCYTES # BLD AUTO: 0.6 K/UL (ref 0.3–1)
MONOCYTES NFR BLD: 8.5 % (ref 4–15)
NEUTROPHILS # BLD AUTO: 3.6 K/UL (ref 1.8–7.7)
NEUTROPHILS NFR BLD: 48.6 % (ref 38–73)
NRBC BLD-RTO: 0 /100 WBC
PHOSPHATE SERPL-MCNC: 3.5 MG/DL (ref 2.7–4.5)
PLATELET # BLD AUTO: 240 K/UL (ref 150–450)
PMV BLD AUTO: 10.9 FL (ref 9.2–12.9)
POTASSIUM SERPL-SCNC: 4.3 MMOL/L (ref 3.5–5.1)
PROT SERPL-MCNC: 6.6 G/DL (ref 6–8.4)
RBC # BLD AUTO: 4.78 M/UL (ref 4–5.4)
SODIUM SERPL-SCNC: 137 MMOL/L (ref 136–145)
WBC # BLD AUTO: 7.41 K/UL (ref 3.9–12.7)

## 2024-11-12 PROCEDURE — 36415 COLL VENOUS BLD VENIPUNCTURE: CPT

## 2024-11-12 PROCEDURE — 83735 ASSAY OF MAGNESIUM: CPT

## 2024-11-12 PROCEDURE — 80053 COMPREHEN METABOLIC PANEL: CPT

## 2024-11-12 PROCEDURE — 25000003 PHARM REV CODE 250

## 2024-11-12 PROCEDURE — 99900035 HC TECH TIME PER 15 MIN (STAT)

## 2024-11-12 PROCEDURE — S4991 NICOTINE PATCH NONLEGEND: HCPCS | Performed by: STUDENT IN AN ORGANIZED HEALTH CARE EDUCATION/TRAINING PROGRAM

## 2024-11-12 PROCEDURE — 84100 ASSAY OF PHOSPHORUS: CPT

## 2024-11-12 PROCEDURE — 94761 N-INVAS EAR/PLS OXIMETRY MLT: CPT

## 2024-11-12 PROCEDURE — 27000221 HC OXYGEN, UP TO 24 HOURS

## 2024-11-12 PROCEDURE — 85025 COMPLETE CBC W/AUTO DIFF WBC: CPT

## 2024-11-12 PROCEDURE — 25000003 PHARM REV CODE 250: Performed by: STUDENT IN AN ORGANIZED HEALTH CARE EDUCATION/TRAINING PROGRAM

## 2024-11-12 RX ORDER — IBUPROFEN 200 MG
1 TABLET ORAL DAILY
Qty: 28 PATCH | Refills: 11 | Status: SHIPPED | OUTPATIENT
Start: 2024-11-12 | End: 2025-11-12

## 2024-11-12 RX ORDER — NEBULIZER AND COMPRESSOR
1 EACH MISCELLANEOUS EVERY 6 HOURS PRN
Qty: 1 EACH | Refills: 0 | Status: SHIPPED | OUTPATIENT
Start: 2024-11-12

## 2024-11-12 RX ORDER — ALBUTEROL SULFATE 0.63 MG/3ML
0.63 SOLUTION RESPIRATORY (INHALATION) EVERY 6 HOURS PRN
Qty: 75 ML | Refills: 0 | Status: SHIPPED | OUTPATIENT
Start: 2024-11-12 | End: 2024-11-12 | Stop reason: HOSPADM

## 2024-11-12 RX ORDER — IPRATROPIUM BROMIDE AND ALBUTEROL SULFATE 2.5; .5 MG/3ML; MG/3ML
3 SOLUTION RESPIRATORY (INHALATION) EVERY 6 HOURS PRN
Qty: 75 ML | Refills: 0 | Status: SHIPPED | OUTPATIENT
Start: 2024-11-12 | End: 2025-11-12

## 2024-11-12 RX ADMIN — OXYCODONE 5 MG: 5 TABLET ORAL at 11:11

## 2024-11-12 RX ADMIN — DOCUSATE SODIUM 100 MG: 100 CAPSULE, LIQUID FILLED ORAL at 09:11

## 2024-11-12 RX ADMIN — POLYETHYLENE GLYCOL 3350 17 G: 17 POWDER, FOR SOLUTION ORAL at 09:11

## 2024-11-12 RX ADMIN — NICOTINE 1 PATCH: 14 PATCH, EXTENDED RELEASE TRANSDERMAL at 09:11

## 2024-11-12 RX ADMIN — OXYCODONE 5 MG: 5 TABLET ORAL at 02:11

## 2024-11-12 NOTE — PLAN OF CARE
VN note: VN completed AVS and attachments and notified bedside nurse, Med. Will cont to be available and intervene prn.

## 2024-11-12 NOTE — PLAN OF CARE
Pt is agreeable with discharge to home today. Follow up information added for AVS. She will be transported home by her spouse Heladio Navarro [phone].     11/12/24 1625   Final Note   Assessment Type Final Discharge Note   Anticipated Discharge Disposition Home   What phone number can be called within the next 1-3 days to see how you are doing after discharge? [phone]   Hospital Resources/Appts/Education Provided Appointments scheduled and added to AVS   Post-Acute Status   Discharge Delays None known at this time

## 2024-11-12 NOTE — PLAN OF CARE
Patient received on    1Lpm NC with SpO2   99 %. Pt with no apparent distress noted. Will continue to monitor.

## 2024-11-12 NOTE — NURSING
Rapid Response Nurse Follow-up Note     Followed up with patient for proactive rounding. No acute issues at this time. Patient AAOx4 and endorses soreness at chest tube site. Chest tube in place and placed to water seal. Minimal amount of blood noted in chest tube. PRN Tylenol and Oxycodone ordered for pain control. Chart and labs reviewed.  Reviewed plan of care with Bedside RNCarol .     Temp 98.1 (36.7)  /79   HR 77  RR 20  SpO2 99% on 3L NC    Team will continue to follow.  Please call Rapid Response RN, Andrew Healy RN with any questions or concerns at [phone].

## 2024-11-12 NOTE — PROGRESS NOTES
[facility] Progress Note    Subjective:      Overnight, no acute events. Left on water seal yesterday. No air leak today, even with cough or valsalva. Pt feeling well overall.      Objective:     Last 24 Hour Vital Signs:  BP  Min: 112/63  Max: 155/79  Temp  Av.1 °F (36.7 °C)  Min: 97.6 °F (36.4 °C)  Max: 98.5 °F (36.9 °C)  Pulse  Av.2  Min: 55  Max: 77  Resp  Av.6  Min: 16  Max: 20  SpO2  Av.3 %  Min: 96 %  Max: 100 %  Weight  Av.5 kg (111 lb 5.3 oz)  Min: 50.5 kg (111 lb 5.3 oz)  Max: 50.5 kg (111 lb 5.3 oz)  I/O last 3 completed shifts:  In: 750 [P.O.:750]  Out: -     Physical Examination:  Physical Exam  Vitals and nursing note reviewed.   Constitutional:       General: She is not in acute distress.     Appearance: She is not toxic-appearing.      Comments: Laying in bed, able to speak in full sentences. Sits up easily for exam.    HENT:      Head: Normocephalic.      Nose: Nose normal.      Mouth/Throat:      Mouth: Mucous membranes are moist.   Eyes:      General:         Right eye: No discharge.         Left eye: No discharge.      Extraocular Movements: Extraocular movements intact.      Conjunctiva/sclera: Conjunctivae normal.   Cardiovascular:      Rate and Rhythm: Normal rate.   Pulmonary:      Effort: Pulmonary effort is normal. No respiratory distress.      Breath sounds: Normal breath sounds. No wheezing.      Comments: Chest tube site c/d/i  Abdominal:      General: There is no distension.      Palpations: Abdomen is soft.   Musculoskeletal:      Right lower leg: No edema.      Left lower leg: No edema.   Skin:     General: Skin is warm and dry.      Capillary Refill: Capillary refill takes less than 2 seconds.   Neurological:      General: No focal deficit present.      Mental Status: She is alert and oriented to person, place, and time.         Laboratory:  Trended Lab Data:  Recent Labs     11/10/24  1011 24  0439 24  1255  "  WBC 6.19 8.67 7.41   HGB 15.3 14.1 14.7   HCT 44.9 41.8 44.2    223 240    137 137   K 3.9 4.5 4.3    105 105   CO2 22* 23 22*   BUN 11 13 14   CREATININE 0.7 0.7 0.7   * 93 89   BILITOT 0.5 0.5 0.5   AST 15 16 15   ALT 12 12 12   ALKPHOS 106 103 93   CALCIUM 9.4 9.2 9.5   ALBUMIN 4.0 3.7 3.8   PROT 7.0 6.6 6.6   MG 1.8 1.9 2.0   PHOS  --  3.2 3.5       Cardiac:   Recent Labs   Lab 11/10/24  1011   TROPONINI <0.006   BNP 51       Urinalysis: No results found for: "LABURIN", "COLORU", "CLARITYU", "SPECGRAV", "LABSPEC", "NITRITE", "PROTEINUR", "GLUCOSEU", "KETONESU", "UROBILINOGEN", "BILIRUBINUR", "BLOODU"    Microbiology:  Microbiology Results (last 7 days)       Procedure Component Value Units Date/Time    Respiratory Infection Panel (PCR), Nasopharyngeal [[id number]] Collected: 11/10/24 9837    Order Status: Completed Specimen: Nasopharyngeal Swab Updated: 11/11/24 0254     Respiratory Infection Panel Source NP Swab     Adenovirus Not Detected     Coronavirus 229E, Common Cold Virus Not Detected     Coronavirus HKU1, Common Cold Virus Not Detected     Coronavirus NL63, Common Cold Virus Not Detected     Coronavirus OC43, Common Cold Virus Not Detected     Comment: The Coronavirus strains detected in this test cause the common cold.  These strains are not the COVID-19 (novel Coronavirus)strain   associated with the respiratory disease outbreak.          SARS-CoV2 (COVID-19) Qualitative PCR Not Detected     Human Metapneumovirus Not Detected     Human Rhinovirus/Enterovirus Not Detected     Influenza A (subtypes H1, H1-2009,H3) Not Detected     Influenza B Not Detected     Parainfluenza Virus 1 Not Detected     Parainfluenza Virus 2 Not Detected     Parainfluenza Virus 3 Not Detected     Parainfluenza Virus 4 Not Detected     Respiratory Syncytial Virus Not Detected     Bordetella Parapertussis (BN5520) Not Detected     Bordetella pertussis (ptxP) Not Detected     Chlamydia pneumoniae Not " Detected     Mycoplasma pneumoniae Not Detected    Narrative:      Assay not valid for lower respiratory specimens, alternate  testing required.            Radiology:  CT Chest 11/10: Impression:  Left-sided pleural drainage catheter in appropriate position.  Small residual left pneumothorax.  Aerated portions of the lungs demonstrates diffuse centrilobular and paraseptal emphysematous change, fibro nodular biapical scarring, and bibasilar atelectasis.  No large region consolidation or pleural effusion.  Scattered pulmonary micro nodules.  For multiple solid nodules all <6 mm, Fleischner Society 2017 guidelines recommend no routine follow up for a low risk patient, or follow up with non-contrast chest CT at 12 months after discovery in a high risk patient.  Diffuse subcutaneous emphysema throughout the left axillary and thoracic soft tissues.    I have personally reviewed the above labs and imaging.    Assessment:     Christin Luna is a 64 y.o.female with h/o current tobacco use and likely COPD/emphysema who presented with secondary spontaneous PTX now s/p chest tube. Pt tolerated water seal yesterday, clamped this morning. Repeat CXR around noon with plan to pull chest tube if ptx does not reaccumulate.      Plan:     #Secondary Spontaneous pneumothorax  # Intermittent air leak  # Current Smoker/ tobacco use disorder  - s/p 14F pleural catheter placement in the ED with subsequent lung expansion   - No identifiable triggering event. Some vigorous coughing lately per patient but no chest trauma/falls. No air travels or hx of chest surgeries.  - Suspect ruptured bleb from emphysema, significant emphysema seen on CT chest worse in apices   - No clear signs of infection, resp panel negative      Recommendations:  - Chest tube clamped, CXR at noon and if stable without PTX will pull   - Pain management per primary  - Smoking cessation counseling on discharge.   - Pt will need outpt fu with pulmonology on discharge      Thank you for allowing us to participate in the care of this patient, we will continue to follow. Please let us know if there are questions or concerns. We will continue to follow.       Kenya Thayer MD  [facility] Pulmonary & Critical Care Medicine Fellow

## 2024-11-12 NOTE — NURSING
Meds administered per MAR.  Gauze/Tegaderm film intact to left mid axillary area.  No complaints of pain at this time.  PIV removed intact.  Tele-monitor disconnected.  Meds delivered to bedside.   at bedside for transport.  AVs handed to patient.  MDs aware patient does not have nebulizer for PRN treatment.  Requested they send this prescription to [facility].  Reminded patient/ to call for Pulmonary appointment if no call to patient in 3 days.  They voiced understanding.

## 2024-11-12 NOTE — PHYSICIAN QUERY
Please clarify the type of subcutaneous emphysema associated with the below clinical findings.  non- traumatic subcutaneous emphysema

## 2024-11-12 NOTE — PLAN OF CARE
Problem: Adult Inpatient Plan of Care  Goal: Patient-Specific Goal (Individualized)  Outcome: Progressing     Problem: Fall Injury Risk  Goal: Absence of Fall and Fall-Related Injury  Outcome: Progressing     Problem: Pain Acute  Goal: Optimal Pain Control and Function  Outcome: Progressing     Problem: Pulmonary Impairment  Goal: Improved Activity Tolerance  Outcome: Progressing     Medications administered per MAR. Tele monitored. Safety precautions maintained. Chest tube in place. Call bell within reach.

## 2024-11-12 NOTE — PROGRESS NOTES
[facility] Medicine Progress Note    Primary Team: [facility] Hospitalist Team B  Attending Physician: Attila Gabriel MD  Resident: Eddie  Intern: Christina    Subjective:      No acute events overnight. Patient resting bed comfortably. No overt increased work of breathing. Patient states she feels better. Patient currently on half a liter O2, but states she does not feel as if she needs the oxygen and forgets to replace it when she removes it.      Objective:     Last 24 Hour Vital Signs:  BP  Min: 112/63  Max: 155/79  Temp  Av.1 °F (36.7 °C)  Min: 97.6 °F (36.4 °C)  Max: 98.5 °F (36.9 °C)  Pulse  Av.7  Min: 52  Max: 77  Resp  Av.6  Min: 16  Max: 20  SpO2  Av.3 %  Min: 97 %  Max: 100 %  Weight  Av.5 kg (111 lb 5.3 oz)  Min: 50.5 kg (111 lb 5.3 oz)  Max: 50.5 kg (111 lb 5.3 oz)  I/O last 3 completed shifts:  In: 750 [P.O.:750]  Out: -     Physical Examination:  Physical Exam  Vitals reviewed.   Constitutional:       General: She is not in acute distress.     Appearance: She is not ill-appearing.   HENT:      Head: Normocephalic.      Mouth/Throat:      Mouth: Mucous membranes are moist.      Pharynx: Oropharynx is clear.   Eyes:      Extraocular Movements: Extraocular movements intact.   Cardiovascular:      Rate and Rhythm: Normal rate and regular rhythm.      Pulses: Normal pulses.      Heart sounds: Normal heart sounds.   Pulmonary:      Effort: Pulmonary effort is normal.      Comments: Pigtail placed on left upper flank  Abdominal:      General: Abdomen is flat. There is no distension.      Tenderness: There is no abdominal tenderness.   Skin:     General: Skin is warm and dry.   Neurological:      General: No focal deficit present.      Mental Status: She is alert.   Psychiatric:         Mood and Affect: Mood normal.          Laboratory:  Laboratory Data Reviewed: yes      Other Results:  EKG (my interpretation): Sinus rhythm with frequent PVCs     Radiology Data Reviewed: yes  Pertinent  Findings:  Chest X-ray (11/10/24@ 10:54): Small to moderate left pneumothorax.  There is no evidence of mediastinal shift.  The lungs are clear.  No pleural fluid.  Osseous structures are intact.   Chest X-ray (11/10/24@ 13:56): Since the previous examination, left pleural drainage catheter has been placed with near complete re-expansion of the left hemithorax noting minimal apical pneumothorax remains.  There is subcutaneous emphysema along the left chest wall related to tube placement.  No additional detrimental changes since the previous exam.   CT Chest without contrast (11/10/2024): Left-sided pleural drainage catheter in appropriate position.  Small residual left pneumothorax. Aerated portions of the lungs demonstrates diffuse centrilobular and paraseptal emphysematous change, fibro nodular biapical scarring, and bibasilar atelectasis.  No large region consolidation or pleural effusion. Scattered pulmonary micro nodules.  For multiple solid nodules all <6 mm, Fleischner Society 2017 guidelines recommend no routine follow up for a low risk patient, or follow up with non-contrast chest CT at 12 months after discovery in a high risk patient. Diffuse subcutaneous emphysema throughout the left axillary and thoracic soft tissues.    Current Medications:     Infusions:       Scheduled:   docusate sodium  100 mg Oral Daily    enoxparin  40 mg Subcutaneous Daily    nicotine  1 patch Transdermal Daily    polyethylene glycol  17 g Oral Daily        PRN:    Current Facility-Administered Medications:     acetaminophen, 650 mg, Oral, Q4H PRN    dextrose 10%, 12.5 g, Intravenous, PRN    dextrose 10%, 25 g, Intravenous, PRN    glucagon (human recombinant), 1 mg, Intramuscular, PRN    glucose, 16 g, Oral, PRN    glucose, 24 g, Oral, PRN    naloxone, 0.02 mg, Intravenous, PRN    oxyCODONE, 5 mg, Oral, Q4H PRN    simethicone, 125 mg, Oral, Q6H PRN    sodium chloride 0.9%, 10 mL, Intravenous, Q12H PRN      Lines and Day Number of  Therapy:  14F pleural catheter, left side: 11/10/24- current    Assessment:     #Left pneumothorax, spontaneous small to moderate  #Suspect ruptured bleb from emphysema given smoking status and CT chest findings   -Patient with new left sided chest pain with radiation to left neck. Associated with increased pain upon deep inspiration  -Troponin <0.006, BNP wnl   -CXR significant for left sided pneumothorax  -Pigtail (L) placed in ER with near complete resolution of pneumothorax per repeat chest X-ray   -CT Chest without contrast showed diffuse subcutaneous emphysema   -Pain control with oxycodone 5 mg q6h PRN and acetaminophen 650 mg q4h PRN  -Pulmonology consulted,appreciate recs  -Pigtail placed on water seal overnight. Clamp trial today per pulmonology      #Chronic cough in association with significant smoking history   #Likely undiagnosed COPD  -will need outpatient pulmonology follow up   -CT Chest without contrast showed diffuse subcutaneous emphysema   -PFTs outpatient      #Smoking history   -patient with significant smoking history   -nicotine patch ordered while inpatient   -Smoking cessation counseling      #Healthcare Maintenance   -A1c 5.4  -lipid panel with cholesterol 228, .8, HDL 58     Code: Full  Diet: Normal Adult  DVT Ppx: enoxaparin   Dispo: pending pneumothorax management and symptom resolution        Carol Vasquez MD  [facility] Neurology, PGY-1  [facility] Hospital Medicine Team B    [facility] Medicine Hospitalist Pager numbers:   [facility] Hospitalist Medicine Team A (Annette/Shalom): [phone]  [facility] Hospitalist Medicine Team B (Nery/Toshia):  [phone]

## 2024-11-12 NOTE — NURSING
RAPID RESPONSE NURSE PROACTIVE ROUNDING NOTE       Time of Visit: 745    Admit Date: 11/10/2024  LOS: 2  Code Status: Full Code   Date of Visit: 2024  : 1960  Age: 64 y.o.  Sex: female  Race: White  Bed: K529/K529 A:   MRN: [id number]  Was the patient discharged from an ICU this admission? No   Was the patient discharged from a PACU within last 24 hours? No   Did the patient receive conscious sedation/general anesthesia in last 24 hours? No   Was the patient in the ED within the past 24 hours? No   Was the patient on NIPPV within the past 24 hours? No   Attending Physician: Attila Gabriel MD  Primary Service: Allergy,Allergy and Immunology,Internal Medicine     Diagnosis: Pneumothorax on left   has no past medical history on file.    Last Vitals:  Temp: 98.5 °F (36.9 °C) ( 0305)  Pulse: 67 (731)  Resp: 18 (731)  BP: 126/78 (731)  SpO2: 96 % (731)    24 Hour Vitals Range:  Temp:  [97.6 °F (36.4 °C)-98.5 °F (36.9 °C)]   Pulse:  [55-77]   Resp:  [16-20]   BP: (112-155)/(63-79)   SpO2:  [96 %-100 %]     ASSESSMENT/INTERVENTIONS    Resting in bed, AAOx4. VSS on 0.5L NC. Patient denies SOB and pain. CT to -20 H20, minimal output. Site CDI. CXR this AM. No acute concerns at the moment, will intervene if needed.      FOLLOW UP    Call back the Rapid Response NurseNhi at [phone] for additional questions or concerns.

## 2024-11-12 NOTE — PLAN OF CARE
Pt not medically ready for discharge to home today. Plans are to discharge home with family. CM will continue to follow pt throughout this admit and provide any discharge needs.      11/12/24 1347   Rounds   Attendance Nurse    Discharge Plan A Home;Home with family   Why the patient remains in the hospital Requires continued medical care   Transition of Care Barriers None

## 2024-11-12 NOTE — PLAN OF CARE
AVS and educational attachments shared with patient and  via [employer] [facility] [employer]. Discussed thoroughly. Patient and  verbalized clear understanding using teach back method. Notified bedside nurse of completion of education. At present no distress noted. Patient to be discharged via w/c with escort service and family with all of patient's belonings. Will cont to be available to patient and family and intervene prn.

## 2024-11-12 NOTE — DISCHARGE SUMMARY
[facility] Medicine Discharge Summary    Primary Team: [facility] Hospitalist Team B  Attending Physician: Attila Gabriel MD  Resident: Eddie  Intern: Christina    Date of Admit: 11/10/2024  Date of Discharge: 11/12/2024    Discharge to: home/self care  Condition: stable    Discharge Diagnoses     Patient Active Problem List   Diagnosis    Smoker    Pneumothorax on left    Tobacco dependency       Consultants and Procedures     Consultants:  Pulmonology    Procedures:   Pigtail placement     Imaging:  Chest X-ray (11/10/24@ 10:54): Small to moderate left pneumothorax.  There is no evidence of mediastinal shift.  The lungs are clear.  No pleural fluid.  Osseous structures are intact.   Chest X-ray (11/10/24@ 13:56): Since the previous examination, left pleural drainage catheter has been placed with near complete re-expansion of the left hemithorax noting minimal apical pneumothorax remains.  There is subcutaneous emphysema along the left chest wall related to tube placement.  No additional detrimental changes since the previous exam.   CT Chest without contrast (11/10/2024): Left-sided pleural drainage catheter in appropriate position.  Small residual left pneumothorax. Aerated portions of the lungs demonstrates diffuse centrilobular and paraseptal emphysematous change, fibro nodular biapical scarring, and bibasilar atelectasis.  No large region consolidation or pleural effusion. Scattered pulmonary micro nodules.  For multiple solid nodules all <6 mm, Fleischner Society 2017 guidelines recommend no routine follow up for a low risk patient, or follow up with non-contrast chest CT at 12 months after discovery in a high risk patient. Diffuse subcutaneous emphysema throughout the left axillary and thoracic soft tissues.    Brief History of Present Illness      Christin Luna is a 64 y.o. female who no known past medical history who presented top [facility] with left sided chest pain that radiated to left  neck/shoulder for 2 days. Patient was moving things around the day prior to presentation when the left chest and shoulder pain suddenly began. She initially thought she may have pulled a muscle and attempted to rest but was unable to get comfortable. The morning of presentation, she began to have sharp chest pain when attempting to take a deep breath, prompting her to go the emergency room.     For the full HPI please refer to the History & Physical from this admission.    Hospital Course By Problem with Pertinent Findings     #Left pneumothorax, spontaneous small to moderate  #Suspect ruptured bleb from emphysema given smoking status and CT chest findings   Patient presented with new left sided chest pain with radiation to left neck. Associated with increased pain upon deep inspiration. Troponin <0.006, BNP wnl. Chest XR significant for left sided pneumothorax. Left sided pigtail placed in ER with near complete resolution of pneumothorax per repeat chest X-ray. CT Chest without contrast showed diffuse subcutaneous emphysema. Patient's pain was controled with oxycodone 5 mg q6h PRN and acetaminophen 650 mg q4h PRN while inpatient. Pulmonology was consulted and managed chest tube.   - Clamp trial was successful and tube was pulled on 11/12/2024.    - patient's SOB has resolved. She is stable and ready for discharge with return precautions given.      #Chronic cough in association with significant smoking history   #Likely undiagnosed COPD  -will need outpatient pulmonology follow up   -CT Chest without contrast showed diffuse subcutaneous emphysema   -PFTs outpatient      #Smoking history   -patient with significant smoking history   -nicotine patch ordered while inpatient   -Smoking cessation counseling      #Healthcare Maintenance   -A1c 5.4  -lipid panel with cholesterol 228, .8, HDL 58    Discharge Medications        Medication List        START taking these medications      albuterol 0.63 mg/3 mL  Nebu  Commonly known as: ACCUNEB  Use one vial (3 mL) by nebulization every 6 (six) hours as needed (shortness of breath) - Rescue     nicotine 21 mg/24 hr  Commonly known as: NICODERM CQ  Place 1 patch onto the skin once daily.     tiotropium-olodateroL 2.5-2.5 mcg/actuation Mist  Commonly known as: STIOLTO RESPIMAT  Inhale 1 puff into the lungs once daily - Controller            CONTINUE taking these medications      ALEVE ORAL               Where to Get Your Medications        These medications were sent to [facility]  [address], [place] [address]      Hours: Mon-Fri, 8a-5:30p Phone: [phone]   albuterol 0.63 mg/3 mL Nebu  nicotine 21 mg/24 hr  tiotropium-olodateroL 2.5-2.5 mcg/actuation Mist         Discharge Information:   Diet:  Normal Adult    Physical Activity:  As able             Instructions:  1. Take all medications as prescribed  2. Keep all follow-up appointments  3. Return to the hospital or call your primary care physicians if any worsening symptoms such as fever, chest pain, shortness of breath, return of symptoms, or any other concerns.    Follow-Up Appointments:  Pulmonology follow up to be scheduled.     Sunitha Phelps MD  [facility] Internal Medicine, PGY-1  [facility] Medicine Team B

## 2024-11-13 ENCOUNTER — TELEPHONE (OUTPATIENT)
Dept: PULMONOLOGY | Facility: CLINIC | Age: 64
End: 2024-11-13
Payer: COMMERCIAL

## 2024-11-13 ENCOUNTER — PATIENT OUTREACH (OUTPATIENT)
Dept: ADMINISTRATIVE | Facility: CLINIC | Age: 64
End: 2024-11-13
Payer: COMMERCIAL

## 2024-11-13 LAB
OHS QRS DURATION: 92 MS
OHS QTC CALCULATION: 419 MS

## 2024-11-13 NOTE — TELEPHONE ENCOUNTER
Message was sent to Ms. Reyna with [facility]. Patient is scheduled on 11/27/24 at 11 am with Dr. Rowan.

## 2024-11-13 NOTE — TELEPHONE ENCOUNTER
----- Message from Mariah sent at 11/13/2024  9:28 AM CST -----  Regarding: HFU  Patient will be discharging from [facility] and a HFU was requested with Pulmonary by DC provider.  Please schedule and message me back so DC can relay appointment information to patient prior to discharge. A referral has been entered.      DX: Pneumothorax on left    Katelynn Guzman  Physician Referral Specialist/Discharge

## 2024-11-13 NOTE — PROGRESS NOTES
C3 nurse spoke with Christin Luna for a TCC post hospital discharge follow up call. The patient has a scheduled HOSFU appointment with Arthur Bauman On 11/20/24 @ 1000.

## 2024-11-20 ENCOUNTER — HOSPITAL ENCOUNTER (OUTPATIENT)
Dept: RADIOLOGY | Facility: HOSPITAL | Age: 64
Discharge: HOME | End: 2024-11-20
Attending: INTERNAL MEDICINE
Payer: COMMERCIAL

## 2024-11-20 DIAGNOSIS — J93.9 PNEUMOTHORAX, UNSPECIFIED TYPE: ICD-10-CM

## 2024-11-20 DIAGNOSIS — J43.2 CENTRILOBULAR EMPHYSEMA: ICD-10-CM

## 2024-11-20 PROCEDURE — 71046 X-RAY EXAM CHEST 2 VIEWS: CPT | Mod: TC,FY

## 2024-11-20 PROCEDURE — 71046 X-RAY EXAM CHEST 2 VIEWS: CPT | Mod: 26,,, | Performed by: STUDENT IN AN ORGANIZED HEALTH CARE EDUCATION/TRAINING PROGRAM

## 2024-11-24 ENCOUNTER — HOSPITAL ENCOUNTER (INPATIENT)
Facility: HOSPITAL | Age: 64
LOS: 2 days | Discharge: HOME | DRG: 191 | End: 2024-11-26
Attending: EMERGENCY MEDICINE | Admitting: INTERNAL MEDICINE
Payer: COMMERCIAL

## 2024-11-24 DIAGNOSIS — J93.9 PNEUMOTHORAX, UNSPECIFIED TYPE: Primary | ICD-10-CM

## 2024-11-24 DIAGNOSIS — Z96.89 CHEST TUBE IN PLACE: ICD-10-CM

## 2024-11-24 DIAGNOSIS — R07.9 CHEST PAIN: ICD-10-CM

## 2024-11-24 LAB
ALBUMIN SERPL BCP-MCNC: 4.3 G/DL (ref 3.5–5.2)
ALP SERPL-CCNC: 95 U/L (ref 40–150)
ALT SERPL W/O P-5'-P-CCNC: 14 U/L (ref 10–44)
ANION GAP SERPL CALC-SCNC: 10 MMOL/L (ref 8–16)
AST SERPL-CCNC: 14 U/L (ref 10–40)
BASOPHILS # BLD AUTO: 0.11 K/UL (ref 0–0.2)
BASOPHILS NFR BLD: 0.9 % (ref 0–1.9)
BILIRUB SERPL-MCNC: 0.2 MG/DL (ref 0.1–1)
BNP SERPL-MCNC: 22 PG/ML (ref 0–99)
BUN SERPL-MCNC: 16 MG/DL (ref 8–23)
CALCIUM SERPL-MCNC: 9.5 MG/DL (ref 8.7–10.5)
CHLORIDE SERPL-SCNC: 108 MMOL/L (ref 95–110)
CO2 SERPL-SCNC: 20 MMOL/L (ref 23–29)
CREAT SERPL-MCNC: 0.7 MG/DL (ref 0.5–1.4)
DIFFERENTIAL METHOD BLD: ABNORMAL
EOSINOPHIL # BLD AUTO: 0.2 K/UL (ref 0–0.5)
EOSINOPHIL NFR BLD: 2 % (ref 0–8)
ERYTHROCYTE [DISTWIDTH] IN BLOOD BY AUTOMATED COUNT: 12.8 % (ref 11.5–14.5)
EST. GFR  (NO RACE VARIABLE): >60 ML/MIN/1.73 M^2
GLUCOSE SERPL-MCNC: 80 MG/DL (ref 70–110)
HCT VFR BLD AUTO: 40.8 % (ref 37–48.5)
HGB BLD-MCNC: 13.9 G/DL (ref 12–16)
IMM GRANULOCYTES # BLD AUTO: 0.04 K/UL (ref 0–0.04)
IMM GRANULOCYTES NFR BLD AUTO: 0.3 % (ref 0–0.5)
LYMPHOCYTES # BLD AUTO: 3.6 K/UL (ref 1–4.8)
LYMPHOCYTES NFR BLD: 30.4 % (ref 18–48)
MCH RBC QN AUTO: 31.6 PG (ref 27–31)
MCHC RBC AUTO-ENTMCNC: 34.1 G/DL (ref 32–36)
MCV RBC AUTO: 93 FL (ref 82–98)
MONOCYTES # BLD AUTO: 1 K/UL (ref 0.3–1)
MONOCYTES NFR BLD: 8.5 % (ref 4–15)
NEUTROPHILS # BLD AUTO: 6.9 K/UL (ref 1.8–7.7)
NEUTROPHILS NFR BLD: 57.9 % (ref 38–73)
NRBC BLD-RTO: 0 /100 WBC
PLATELET # BLD AUTO: 271 K/UL (ref 150–450)
PMV BLD AUTO: 10.9 FL (ref 9.2–12.9)
POTASSIUM SERPL-SCNC: 4.3 MMOL/L (ref 3.5–5.1)
PROT SERPL-MCNC: 7.1 G/DL (ref 6–8.4)
RBC # BLD AUTO: 4.4 M/UL (ref 4–5.4)
SODIUM SERPL-SCNC: 138 MMOL/L (ref 136–145)
TROPONIN I SERPL DL<=0.01 NG/ML-MCNC: <0.006 NG/ML (ref 0–0.03)
WBC # BLD AUTO: 11.96 K/UL (ref 3.9–12.7)

## 2024-11-24 PROCEDURE — 96374 THER/PROPH/DIAG INJ IV PUSH: CPT

## 2024-11-24 PROCEDURE — 25000003 PHARM REV CODE 250: Performed by: EMERGENCY MEDICINE

## 2024-11-24 PROCEDURE — 0W9B30Z DRAINAGE OF LEFT PLEURAL CAVITY WITH DRAINAGE DEVICE, PERCUTANEOUS APPROACH: ICD-10-PCS | Performed by: EMERGENCY MEDICINE

## 2024-11-24 PROCEDURE — 84484 ASSAY OF TROPONIN QUANT: CPT | Performed by: EMERGENCY MEDICINE

## 2024-11-24 PROCEDURE — 99285 EMERGENCY DEPT VISIT HI MDM: CPT | Mod: 25

## 2024-11-24 PROCEDURE — 80053 COMPREHEN METABOLIC PANEL: CPT | Performed by: EMERGENCY MEDICINE

## 2024-11-24 PROCEDURE — 83880 ASSAY OF NATRIURETIC PEPTIDE: CPT | Performed by: EMERGENCY MEDICINE

## 2024-11-24 PROCEDURE — 96375 TX/PRO/DX INJ NEW DRUG ADDON: CPT

## 2024-11-24 PROCEDURE — 93005 ELECTROCARDIOGRAM TRACING: CPT

## 2024-11-24 PROCEDURE — 63600175 PHARM REV CODE 636 W HCPCS: Performed by: EMERGENCY MEDICINE

## 2024-11-24 PROCEDURE — 32551 INSERTION OF CHEST TUBE: CPT

## 2024-11-24 PROCEDURE — 12000002 HC ACUTE/MED SURGE SEMI-PRIVATE ROOM

## 2024-11-24 PROCEDURE — 85025 COMPLETE CBC W/AUTO DIFF WBC: CPT | Performed by: EMERGENCY MEDICINE

## 2024-11-24 PROCEDURE — 93010 ELECTROCARDIOGRAM REPORT: CPT | Mod: ,,, | Performed by: STUDENT IN AN ORGANIZED HEALTH CARE EDUCATION/TRAINING PROGRAM

## 2024-11-24 RX ORDER — LORAZEPAM 2 MG/ML
1 INJECTION INTRAMUSCULAR
Status: COMPLETED | OUTPATIENT
Start: 2024-11-24 | End: 2024-11-24

## 2024-11-24 RX ORDER — LIDOCAINE HYDROCHLORIDE 10 MG/ML
10 INJECTION, SOLUTION EPIDURAL; INFILTRATION; INTRACAUDAL; PERINEURAL
Status: COMPLETED | OUTPATIENT
Start: 2024-11-24 | End: 2024-11-24

## 2024-11-24 RX ORDER — MORPHINE SULFATE 4 MG/ML
4 INJECTION, SOLUTION INTRAMUSCULAR; INTRAVENOUS
Status: COMPLETED | OUTPATIENT
Start: 2024-11-24 | End: 2024-11-24

## 2024-11-24 RX ADMIN — LORAZEPAM 1 MG: 2 INJECTION INTRAMUSCULAR; INTRAVENOUS at 09:11

## 2024-11-24 RX ADMIN — LIDOCAINE HYDROCHLORIDE 100 MG: 10 INJECTION, SOLUTION EPIDURAL; INFILTRATION; INTRACAUDAL at 09:11

## 2024-11-24 RX ADMIN — SODIUM CHLORIDE 500 ML: 9 INJECTION, SOLUTION INTRAVENOUS at 11:11

## 2024-11-24 RX ADMIN — MORPHINE SULFATE 4 MG: 4 INJECTION INTRAVENOUS at 10:11

## 2024-11-24 NOTE — Clinical Note
Diagnosis: Pneumothorax, unspecified type [[id number]]   Future Attending Provider: NATY DELUCA [[id number]]   Reason for IP Medical Treatment  (Clinical interventions that can only be accomplished in the IP setting? ) :: ptx   Plans for Post-Acute care--if anticipated (pick the single best option):: A. No post acute care anticipated at this time

## 2024-11-25 LAB
ANION GAP SERPL CALC-SCNC: 9 MMOL/L (ref 8–16)
BASOPHILS # BLD AUTO: 0.08 K/UL (ref 0–0.2)
BASOPHILS NFR BLD: 0.6 % (ref 0–1.9)
BUN SERPL-MCNC: 12 MG/DL (ref 8–23)
CALCIUM SERPL-MCNC: 8.5 MG/DL (ref 8.7–10.5)
CHLORIDE SERPL-SCNC: 108 MMOL/L (ref 95–110)
CO2 SERPL-SCNC: 19 MMOL/L (ref 23–29)
CREAT SERPL-MCNC: 0.6 MG/DL (ref 0.5–1.4)
DIFFERENTIAL METHOD BLD: ABNORMAL
EOSINOPHIL # BLD AUTO: 0 K/UL (ref 0–0.5)
EOSINOPHIL NFR BLD: 0.3 % (ref 0–8)
ERYTHROCYTE [DISTWIDTH] IN BLOOD BY AUTOMATED COUNT: 12.9 % (ref 11.5–14.5)
EST. GFR  (NO RACE VARIABLE): >60 ML/MIN/1.73 M^2
GLUCOSE SERPL-MCNC: 119 MG/DL (ref 70–110)
HCT VFR BLD AUTO: 35.7 % (ref 37–48.5)
HGB BLD-MCNC: 12.3 G/DL (ref 12–16)
IMM GRANULOCYTES # BLD AUTO: 0.04 K/UL (ref 0–0.04)
IMM GRANULOCYTES NFR BLD AUTO: 0.3 % (ref 0–0.5)
LYMPHOCYTES # BLD AUTO: 2 K/UL (ref 1–4.8)
LYMPHOCYTES NFR BLD: 15 % (ref 18–48)
MCH RBC QN AUTO: 31.5 PG (ref 27–31)
MCHC RBC AUTO-ENTMCNC: 34.5 G/DL (ref 32–36)
MCV RBC AUTO: 91 FL (ref 82–98)
MONOCYTES # BLD AUTO: 0.7 K/UL (ref 0.3–1)
MONOCYTES NFR BLD: 5.6 % (ref 4–15)
NEUTROPHILS # BLD AUTO: 10.2 K/UL (ref 1.8–7.7)
NEUTROPHILS NFR BLD: 78.2 % (ref 38–73)
NRBC BLD-RTO: 0 /100 WBC
OHS QRS DURATION: 86 MS
OHS QTC CALCULATION: 419 MS
PLATELET # BLD AUTO: 225 K/UL (ref 150–450)
PMV BLD AUTO: 10.6 FL (ref 9.2–12.9)
POCT GLUCOSE: 163 MG/DL (ref 70–110)
POTASSIUM SERPL-SCNC: 4.3 MMOL/L (ref 3.5–5.1)
RBC # BLD AUTO: 3.91 M/UL (ref 4–5.4)
SODIUM SERPL-SCNC: 136 MMOL/L (ref 136–145)
TROPONIN I SERPL DL<=0.01 NG/ML-MCNC: <0.006 NG/ML (ref 0–0.03)
WBC # BLD AUTO: 13.1 K/UL (ref 3.9–12.7)

## 2024-11-25 PROCEDURE — 85025 COMPLETE CBC W/AUTO DIFF WBC: CPT

## 2024-11-25 PROCEDURE — 99900035 HC TECH TIME PER 15 MIN (STAT)

## 2024-11-25 PROCEDURE — 63600175 PHARM REV CODE 636 W HCPCS

## 2024-11-25 PROCEDURE — 97165 OT EVAL LOW COMPLEX 30 MIN: CPT

## 2024-11-25 PROCEDURE — 94761 N-INVAS EAR/PLS OXIMETRY MLT: CPT

## 2024-11-25 PROCEDURE — 27000221 HC OXYGEN, UP TO 24 HOURS

## 2024-11-25 PROCEDURE — 25000003 PHARM REV CODE 250

## 2024-11-25 PROCEDURE — 80048 BASIC METABOLIC PNL TOTAL CA: CPT

## 2024-11-25 PROCEDURE — 11000001 HC ACUTE MED/SURG PRIVATE ROOM

## 2024-11-25 PROCEDURE — 97161 PT EVAL LOW COMPLEX 20 MIN: CPT

## 2024-11-25 RX ORDER — SODIUM CHLORIDE 0.9 % (FLUSH) 0.9 %
10 SYRINGE (ML) INJECTION EVERY 12 HOURS PRN
Status: DISCONTINUED | OUTPATIENT
Start: 2024-11-25 | End: 2024-11-26 | Stop reason: HOSPADM

## 2024-11-25 RX ORDER — IPRATROPIUM BROMIDE AND ALBUTEROL SULFATE 2.5; .5 MG/3ML; MG/3ML
3 SOLUTION RESPIRATORY (INHALATION) EVERY 6 HOURS PRN
Status: DISCONTINUED | OUTPATIENT
Start: 2024-11-25 | End: 2024-11-26 | Stop reason: HOSPADM

## 2024-11-25 RX ORDER — ENOXAPARIN SODIUM 100 MG/ML
40 INJECTION SUBCUTANEOUS EVERY 24 HOURS
Status: DISCONTINUED | OUTPATIENT
Start: 2024-11-25 | End: 2024-11-26 | Stop reason: HOSPADM

## 2024-11-25 RX ORDER — IBUPROFEN 200 MG
16 TABLET ORAL
Status: DISCONTINUED | OUTPATIENT
Start: 2024-11-24 | End: 2024-11-26 | Stop reason: HOSPADM

## 2024-11-25 RX ORDER — BISMUTH SUBSALICYLATE 525 MG/30ML
30 LIQUID ORAL ONCE
Status: COMPLETED | OUTPATIENT
Start: 2024-11-25 | End: 2024-11-25

## 2024-11-25 RX ORDER — IBUPROFEN 200 MG
16 TABLET ORAL
Status: DISCONTINUED | OUTPATIENT
Start: 2024-11-25 | End: 2024-11-25

## 2024-11-25 RX ORDER — GLUCAGON 1 MG
1 KIT INJECTION
Status: DISCONTINUED | OUTPATIENT
Start: 2024-11-25 | End: 2024-11-26 | Stop reason: HOSPADM

## 2024-11-25 RX ORDER — DOCUSATE SODIUM 100 MG/1
100 CAPSULE, LIQUID FILLED ORAL DAILY
Status: DISCONTINUED | OUTPATIENT
Start: 2024-11-25 | End: 2024-11-26 | Stop reason: HOSPADM

## 2024-11-25 RX ORDER — IBUPROFEN 200 MG
24 TABLET ORAL
Status: DISCONTINUED | OUTPATIENT
Start: 2024-11-24 | End: 2024-11-25

## 2024-11-25 RX ORDER — SIMETHICONE 125 MG
125 TABLET,CHEWABLE ORAL ONCE
Status: COMPLETED | OUTPATIENT
Start: 2024-11-25 | End: 2024-11-25

## 2024-11-25 RX ORDER — OXYCODONE HYDROCHLORIDE 5 MG/1
5 TABLET ORAL EVERY 4 HOURS PRN
Status: DISCONTINUED | OUTPATIENT
Start: 2024-11-24 | End: 2024-11-26 | Stop reason: HOSPADM

## 2024-11-25 RX ORDER — IBUPROFEN 200 MG
24 TABLET ORAL
Status: DISCONTINUED | OUTPATIENT
Start: 2024-11-25 | End: 2024-11-26 | Stop reason: HOSPADM

## 2024-11-25 RX ORDER — ACETAMINOPHEN 325 MG/1
650 TABLET ORAL EVERY 4 HOURS PRN
Status: DISCONTINUED | OUTPATIENT
Start: 2024-11-24 | End: 2024-11-26 | Stop reason: HOSPADM

## 2024-11-25 RX ORDER — NALOXONE HCL 0.4 MG/ML
0.02 VIAL (ML) INJECTION
Status: DISCONTINUED | OUTPATIENT
Start: 2024-11-24 | End: 2024-11-26 | Stop reason: HOSPADM

## 2024-11-25 RX ORDER — GLUCAGON 1 MG
1 KIT INJECTION
Status: DISCONTINUED | OUTPATIENT
Start: 2024-11-24 | End: 2024-11-25

## 2024-11-25 RX ADMIN — OXYCODONE 5 MG: 5 TABLET ORAL at 11:11

## 2024-11-25 RX ADMIN — OXYCODONE 5 MG: 5 TABLET ORAL at 03:11

## 2024-11-25 RX ADMIN — OXYCODONE 5 MG: 5 TABLET ORAL at 12:11

## 2024-11-25 RX ADMIN — BISMUTH SUBSALICYLATE 30 ML: 525 LIQUID ORAL at 03:11

## 2024-11-25 RX ADMIN — DOCUSATE SODIUM 100 MG: 100 CAPSULE, LIQUID FILLED ORAL at 04:11

## 2024-11-25 RX ADMIN — ENOXAPARIN SODIUM 40 MG: 40 INJECTION SUBCUTANEOUS at 04:11

## 2024-11-25 RX ADMIN — OXYCODONE 5 MG: 5 TABLET ORAL at 07:11

## 2024-11-25 RX ADMIN — OXYCODONE 5 MG: 5 TABLET ORAL at 08:11

## 2024-11-25 RX ADMIN — SIMETHICONE 125 MG: 125 TABLET, CHEWABLE ORAL at 09:11

## 2024-11-25 NOTE — NURSING
RAPID RESPONSE NURSE PROACTIVE ROUNDING NOTE       Time of Visit: 745    Admit Date: 2024  LOS: 1  Code Status: Full Code   Date of Visit: 2024  : 1960  Age: 64 y.o.  Sex: female  Race: White  Bed: ED 13/EXAM 13:   MRN: [id number]  Was the patient discharged from an ICU this admission? No   Was the patient discharged from a PACU within last 24 hours? No   Did the patient receive conscious sedation/general anesthesia in last 24 hours? No   Was the patient in the ED within the past 24 hours? No   Was the patient on NIPPV within the past 24 hours? No   Attending Physician: Keyur Pope MD  Primary Service: Internal Medicine   Time spent at the bedside: 15 -30 min    SITUATION    Notified by previous RRN during handoff  Reason for alert: Pneumothorax    Diagnosis: <principal problem not specified>   has no past medical history on file.    Last Vitals:  Temp: 98.5 °F (36.9 °C) ( 0502)  Pulse: 54 ( 0802)  Resp: 27 ( 0802)  BP: 134/78 ( 0802)  SpO2: 96 % ( 0802)    24 Hour Vitals Range:  Temp:  [98.2 °F (36.8 °C)-98.8 °F (37.1 °C)]   Pulse:  [53-75]   Resp:  [16-36]   BP: ()/(40-91)   SpO2:  [90 %-99 %]     Clinical Issues: Respiratory    ASSESSMENT/INTERVENTIONS    Pt seen w/ pulmonary team. Pt awake/alert and on 2L NC. Endorses left sided pain near Chest tube insertion site. Chest tube set to water seal. VSS. Pt SO at bedside. All questions and concerns addressed by ICU team.     Chart and labs reviewed    RECOMMENDATIONS  Monitor VS and respiratory status  Pain control  Notify primary or MD nurse of any acute changes      PROVIDER ESCALATION    Physician escalation: Yes    Orders received and case discussed with Dr. Garcia .    Disposition:Remain in room ED 13    FOLLOW UP    Call back the Rapid Response NurseAntonio at [phone] for additional questions or concerns.

## 2024-11-25 NOTE — ED NOTES
V/S are as followed BP-171/100, HR-80, 91% on 4L NC at this time.     Staff mentioned in previous noted at bedside at this time.

## 2024-11-25 NOTE — ED NOTES
Bedside report received per MINA Arias. Pt resting quietly in bed. AAOx4. Chest tube in place to Lt lateral chest wall. Drsg CDI. Breath sounds CTA, except diminished to left base. Pt denies any complaints at this time and in no visible distress.

## 2024-11-25 NOTE — ED NOTES
Spoke with MD CHATA okay with oxygen saturation staying above 88% without oxygen use at this time.

## 2024-11-25 NOTE — ED NOTES
MD at bedside for explanation and to obtain consent. Consent signed at bedside at this time by patient. Consent scanned into chart.

## 2024-11-25 NOTE — PROGRESS NOTES
VIRTUAL NURSE: Cued into patient's room. Permission received per patient to turn camera to view patient. Introduced as VN that will be working with floor nurse this shift. Educated the patient and her family on VN's role in patient care. Plan of care reviewed with patient. Informed patient that staff will round on them every 2 hours but to use call light for any other needs they may have. Also informed of fall risk and fall precautions. Patient verbalized understanding. Call light within reach; bed siderails up x2. Opportunity given for questions and questions answered. Admission assessment questions completed.     11/25/24 1238   Nurse Notification   Bedside Nurse Notified? Yes   Name of Bedside Nurse Orlando Melendez RN   Nurse Notfication Method Vidyo   Nurse Notified Of Medications;Patient Request  (pain 6/10)   Admission   Initial VN Admission Questions Complete   Communication Issues? None   Shift   Virtual Nurse - Patient Verbalized Approval Of Camera Use   Safety/Activity   Patient Rounds bed in low position;visualized patient;call light in patient/parent reach;placement of personal items at bedside   Safety Promotion/Fall Prevention assistive device/personal item within reach;Fall Risk reviewed with patient/family;medications reviewed;side rails raised x 2;instructed to call staff for mobility   Positioning   Body Position position changed independently   Pain/Comfort/Sleep   Preferred Pain Scale number (Numeric Rating Pain Scale)   Pain Body Location - Side Left   Pain Body Location chest  (chest tube insertion site)   Pain Rating (0-10): Rest 6

## 2024-11-25 NOTE — ED NOTES
Dr. Patel suturing tube in place at this time.     Same staff as mentioned in previous note at bedside at this time.

## 2024-11-25 NOTE — ED NOTES
Patient reports pain to back and at insertion point of chest tube at this time. PRN for pain administered at this time.

## 2024-11-25 NOTE — ED PROVIDER NOTES
Emergency Department Encounter  Provider Note  Encounter Date: 11/24/2024    Patient Name: Christin Luna  MRN: [id number]    History of Present Illness   HPI  History of Present Illness:    Chief Complaint:   Chief Complaint   Patient presents with    Chest Pain     Pt presents to the ED c/o mid sternal chest pain. Pt anxious in triage. Pt states she is SOB r/t pain. EKG done in triage. Pt states she was dx with PNA 2 weeks ago.     64-year-old female presenting with sudden left-sided chest pain, pain with deep inspiration, going into her neck and upper abdomen.  Patient is very anxious and tearful.  Says that she had a spontaneous pneumothorax and required a chest tube and admission 2 weeks ago, and was feeling better, until today when she had these symptoms suddenly.    The following PMH/PSH/SocHx/FamHx has been reviewed by myself:  History reviewed. No pertinent past medical history.  History reviewed. No pertinent surgical history.  Social History     Tobacco Use    Smoking status: Former     Current packs/day: 1.00     Average packs/day: 1 pack/day for 34.9 years (34.9 ttl pk-yrs)     Types: Cigarettes     Start date: 1/1/1990    Smokeless tobacco: Never   Substance Use Topics    Alcohol use: Never    Drug use: Not Currently     No family history on file.  Allergies reviewed:  Review of patient's allergies indicates:  No Known Allergies  Medications reviewed:  Medication List with Changes/Refills   Current Medications    ALBUTEROL SULFATE (PROAIR RESPICLICK) 90 MCG/ACTUATION INHALER    Inhale 1 puff into the lungs every 6 (six) hours as needed for Wheezing or Shortness of Breath. Rescue    ALBUTEROL-IPRATROPIUM (DUO-NEB) 2.5 MG-0.5 MG/3 ML NEBULIZER SOLUTION    Take 3 mLs by nebulization every 6 (six) hours as needed for Wheezing. Rescue    NAPROXEN SODIUM (ALEVE ORAL)    Take 1-2 tablets by mouth as needed.    NEBULIZER ACCESSORIES KIT    1 kit by Misc.(Non-Drug; Combo Route) route every 6 (six) hours as needed  "(SOB, wheezing).    NEBULIZER AND COMPRESSOR LUIS ENRIQUE    1 Units by Misc.(Non-Drug; Combo Route) route every 6 (six) hours as needed (wheezing, SOB).    TIOTROPIUM-OLODATEROL (STIOLTO RESPIMAT) 2.5-2.5 MCG/ACTUATION MIST    Inhale 1 puff into the lungs once daily - Controller       Physical Exam     Initial Vitals [24 1910]   BP Pulse Resp Temp SpO2   (!) 147/82 67 19 98.2 °F (36.8 °C) 95 %      MAP       --           Triage vital signs reviewed.    Constitutional: Well-nourished, well-developed.  Anxious, tearful.  HENT: Normocephalic, atraumatic. Moist mucous membranes.  Eyes: No conjunctival injection.  Resp:  Tachypneic, taking shallow breaths, decreased lung sounds on the left.  Cardio: Regular rate and rhythm.  GI: Abdomen non-distended.   MSK: Extremities without any deformities noted. No lower extremity edema.  Skin: Warm and dry. No rashes or lesions noted.  Neuro: Awake and alert. Moves all extremities.    ED Course   Chest Tube    Date/Time: 2024 11:40 PM  Location procedure was performed: [facility] EMERGENCY DEPARTMENT    Performed by: Molly Patel MD  Authorized by: Keyur Pope MD  Consent Done: Yes  Consent: Verbal consent obtained. Written consent obtained.  Risks and benefits: risks, benefits and alternatives were discussed  Consent given by: patient  Patient understanding: patient states understanding of the procedure being performed  Patient consent: the patient's understanding of the procedure matches consent given  Procedure consent: procedure consent matches procedure scheduled  Site marked: the operative site was marked  Imaging studies: imaging studies available  Required items: required blood products, implants, devices, and special equipment available  Patient identity confirmed: , name and verbally with patient  Time out: Immediately prior to procedure a "time out" was called to verify the correct patient, procedure, equipment, support staff and site/side marked as " required.  Indications: pneumothorax    Patient sedated: no  Anesthesia: local infiltration    Anesthesia:  Local Anesthetic: lidocaine 1% without epinephrine  Anesthetic total: 10 mL  Preparation: skin prepped with ChloraPrep and sterile field established  Placement location: left lateral  Scalpel size: 11  Tube size: 16 [place]  Ultrasound guidance: no  Tension pneumothorax heard: no  Tube connected to: water seal  Suture material: 0 silk  Post-insertion x-ray findings: tube in good position  Patient tolerance: Patient tolerated the procedure well with no immediate complications  Complications: No          Medical Decision Making    History Acquisition     The history is provided by the patient.   Assessment requiring an independent historian and why historian was needed:  Has been at bedside supplementing history    Review of prior external/non ED notes:  Spontaneous pneumothorax 2 weeks ago, status post pigtail, discharged 11/14, has upcoming pulmonology appointment    Differential Diagnoses   Based on available information and initial assessment, the working Differential Diagnosis includes, but is not limited to:  PE, MI/ACS, pneumothorax, pericardial effusion/tamonade, pneumonia, lung abscess, pericarditis/myocarditis, pleural effusion, lung mass, CHF exacerbation, asthma exacerbation, COPD exacerbation, aspirated/ingested foreign body, airway obstruction, CO poisoning, anemia, metabolic derangement, allergy/atopy, influenza, viral URI, viral syndrome.    EKG   EKG ordered and independently reviewed by me:   Normal sinus rhythm, rate 71, normal intervals, normal axis, no STEMI    Labs   Lab tests ordered and independently reviewed by me:    Labs Reviewed   CBC W/ AUTO DIFFERENTIAL - Abnormal       Result Value    WBC 11.96      RBC 4.40      Hemoglobin 13.9      Hematocrit 40.8      MCV 93      MCH 31.6 (*)     MCHC 34.1      RDW 12.8      Platelets 271      MPV 10.9      Immature Granulocytes 0.3      Gran #  (ANC) 6.9      Immature Grans (Abs) 0.04      Lymph # 3.6      Mono # 1.0      Eos # 0.2      Baso # 0.11      nRBC 0      Gran % 57.9      Lymph % 30.4      Mono % 8.5      Eosinophil % 2.0      Basophil % 0.9      Differential Method Automated     COMPREHENSIVE METABOLIC PANEL - Abnormal    Sodium 138      Potassium 4.3      Chloride 108      CO2 20 (*)     Glucose 80      BUN 16      Creatinine 0.7      Calcium 9.5      Total Protein 7.1      Albumin 4.3      Total Bilirubin 0.2      Alkaline Phosphatase 95      AST 14      ALT 14      eGFR >60      Anion Gap 10     TROPONIN I    Troponin I <0.006     B-TYPE NATRIURETIC PEPTIDE    BNP 22     TROPONIN I       Imaging   Imaging ordered and independently reviewed by me:   Imaging Results              X-Ray Chest AP Portable (In process)  Result time 11/24/24 23:43:03                      X-Ray Chest AP Portable (Final result)  Result time 11/24/24 20:43:55      Final result by Victor Manuel Willett MD (11/24/24 20:43:55)                   Impression:      This report was flagged in Epic as abnormal.    1. Large left pneumothorax, rightward mediastinal shift may reflect pneumothorax under tension.  Correlation and follow-up is advised.  2. Interstitial findings suggest edema noting likely underlying emphysematous change.      Electronically signed by: Victor Manuel Willett MD  Date:    11/24/2024  Time:    20:43               Narrative:    EXAMINATION:  XR CHEST AP PORTABLE    CLINICAL HISTORY:  Chest Pain;    TECHNIQUE:  Single frontal view of the chest was performed.    COMPARISON:  11/20/2024    FINDINGS:  The cardiomediastinal silhouette is not enlarged.  There is no pleural effusion.  The trachea is midline.  The lungs are asymmetrically expanded, right greater than left with coarse interstitial attenuation throughout the parenchyma..  No large focal consolidation seen.  There is a large left pneumothorax.  There is mediastinal shift to the right, may reflect  pneumothorax under tension..  The osseous structures are remarkable for degenerative change..                                         Additional Consideration   Christin Luna  presents to the Emergency Department today with left-sided chest pain, pain with inspiration.  History of a spontaneous pneumothorax, EKG is nonischemic, x-ray, labs, disposition pending results.    Additional testing considered but not indicated during the course of this workup: further imaging and labwork, not indicated  Co-morbidities/chronic illness/exacerbation of chronic illness considered which impacted clinical decision making:  History of smoking, age  Procedures done in the ED or plan for the OR: Yes, describe:  Pigtail chest tube  Social determinants of care considered during development of treatment plan include: Decreased medical literacy  Discussion of management or test interpretation with external provider: Yes, describe:  See ED course  DNR discussion: No    The patient's list of active medications and allergies as documented per RN staff has been reviewed.  Medications given in the ED and/or prescribed:   Medications   sodium chloride 0.9% bolus 500 mL 500 mL (500 mLs Intravenous New Bag 11/24/24 2318)   LIDOcaine (PF) 10 mg/ml (1%) injection 100 mg (100 mg Infiltration Given by Provider 11/24/24 2125)   LORazepam injection 1 mg (1 mg Intravenous Given 11/24/24 2125)   morphine injection 4 mg (4 mg Intravenous Given 11/24/24 2259)             ED Course as of 11/24/24 2345   Sun Nov 24, 2024 2109 CBC auto differential(!)  Independently interpreted by me; unremarkable or consistent with the patient's baseline   [CS]   2109 Comprehensive metabolic panel(!)  Independently interpreted by me; unremarkable or consistent with the patient's baseline   [CS]   2109 Troponin I: <0.006 [CS]   2109 X-Ray Chest AP Portable(!)  Left-sided pneumothorax, patient consented for left-sided pigtail chest tube [CS]   2344 Pigtail Procedures  successful, admitted to [facility], Dr. Pope as the attending. [CS]      ED Course User Index  [CS] Molly Patel MD       Explanation of disposition: Admit    Critical Care:    I have personally provided 60 minutes of critical care time exclusive of time spent on separately billable procedures. Time includes review of laboratory data, radiology results, discussion with consultants, and monitoring for potential decompensation. Interventions were performed as documented above.      Clinical Impression:     1. Pneumothorax, unspecified type    2. Chest pain    3. Chest tube in place         ED Disposition Condition    Admit Stable               Molly Patel MD  11/24/24 8481

## 2024-11-25 NOTE — ED NOTES
Time out completed by Dr. Patel at this time for left chest tube/pig tail for pneumothorax  MINA Arias, MINA Catsillo, Nick (ED tech), and Dr. Garcia at bedside at this time.
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
## 2024-11-25 NOTE — ED NOTES
and Dr. Garcia at bedside at this time. Anna RN and Juana RN at bedside at this time. Patient connected to 4L oxygen via NC at this time by MD. Room checked for emergency equipment, equipment in place at this time.

## 2024-11-25 NOTE — ED NOTES
Dr. Patel makes first incision at this time to left chest.     Staff mentioned in previous note at bedside at this time.

## 2024-11-25 NOTE — ED NOTES
"Patient reporting "gas symptoms" at this time. Patient reports she believes it may be something she ate earlier. Inpatient MD notified at this time.   "

## 2024-11-25 NOTE — ED NOTES
Patient found without oxygen at this time. Patient informed RN that oxygen was taken off by MD. Oxygen saturation at 97% on room air at this time.

## 2024-11-25 NOTE — ED NOTES
Patient placed on 2L NC at this time per MD nursing communication. Patient's oxygen saturation improved 93% on 2L NC at this time.

## 2024-11-25 NOTE — PLAN OF CARE
Problem: Physical Therapy  Goal: Physical Therapy Goal  Description: Goals to be met by: 24     Patient will increase functional independence with mobility by performin. Sit to stand transfer with Dolores  2. Bed to chair transfer with Dolores using No Assistive Device  3. Gait  x 250 feet with Dolores using No Assistive Device.     Outcome: Progressing     Pt limited by chest tube this date and reporting 7/10 pain in L lateral and posterior back near chest tube insertion. Pt was able to stand and take side steps with Supervision and no AD. D/c recs TBD pending progress - anticipate low intensity therapy. Will progress as able.

## 2024-11-25 NOTE — ED NOTES
Chest tube connected to atrium at this time by Dr. Patel.     Patient AAOX4 at this time. V/S stable. Chest tube dressed with Bio-Patch and Tegaderm by this RN per Dr. Patel.      Chest tube to left lateral chest intact at this time. Water seal chest tube drainage system in upright position and below the level of the patient's chest. Tubing is free of kinks at this time. Tidaling present in water seal chamber. Respirations even and unlabored at this time.     Chest tube insertion and procedure completed at this time.

## 2024-11-25 NOTE — ED NOTES
Spoke to MD patient regarding oxygen saturation goal for this patient at this time. MD informed patient currently satting 90-91% on room air. MD stated to keep oxygen saturation above 92% and to place patient on nasal cannula at this time.

## 2024-11-25 NOTE — PT/OT/SLP EVAL
Physical Therapy Evaluation    Patient Name:  Christin Luna   MRN:  [id number]    Recommendations:     Discharge Recommendations:  (TBD - anticipate low intensity therapy)   Discharge Equipment Recommendations:  (TBD - likely shower chair)   Barriers to discharge:  impaired endurance    Assessment:     Christin Luna is a 64 y.o. female admitted with a medical diagnosis of The primary encounter diagnosis was Pneumothorax, unspecified type. Diagnoses of Chest pain and Chest tube in place were also pertinent to this visit.  .  She presents with the following impairments/functional limitations: impaired skin, pain, impaired endurance, impaired functional mobility, impaired self care skills .Pt limited by chest tube this date and reporting 7/10 pain in L lateral and posterior back near chest tube insertion. Pt was able to stand and take steps with Supervision and no AD. D/c recs TBD pending progress - anticipate low intensity therapy. Will progress as able.    Rehab Prognosis: Good; patient would benefit from acute skilled PT services to address these deficits and reach maximum level of function.    Recent Surgery: * No surgery found *      Plan:     During this hospitalization, patient to be seen 3 x/week to address the identified rehab impairments via gait training, therapeutic activities, therapeutic exercises, neuromuscular re-education and progress toward the following goals:    Plan of Care Expires:  12/25/24    Subjective     Chief Complaint: pain near chest tube insertion  Patient/Family Comments/goals: to return to PLOF  Pain/Comfort:  Pain Rating 1: 7/10  Location - Side 1: Left  Location 1: back  Pain Addressed 1: Reposition, Distraction, Cessation of Activity, Nurse notified  Pain Rating Post-Intervention 1: 7/10    Patients cultural, spiritual, Episcopal conflicts given the current situation: no    Living Environment:  Pt lives with spouse & daughter, SS, THE, WIS   Prior to admission, patients level of  function was Independent, drives, and owns an antique shop; no falls.  Equipment used at home: none.  DME owned (not currently used): none.  Upon discharge, patient will have assistance from family.    Objective:     Communicated with nsg prior to session.  Patient found supine with bed alarm, chest tube  upon PT entry to room.    General Precautions: Standard, fall  Orthopedic Precautions:N/A   Braces: N/A  Respiratory Status: Nasal cannula, flow 2 L/min    Exams:  Cognitive Exam:  Patient is oriented to Person, Place, Time, and Situation  Postural Exam:  Patient presented with the following abnormalities:    -       Rounded shoulders  Sensation:    -       Intact  light/touch BLE  Skin Integrity/Edema:      -       Skin integrity: chest tube insertion L back  -       Edema: None noted BLE  RLE ROM: WFL  RLE Strength: WFL  LLE ROM: WFL  LLE Strength: WFL    Functional Mobility:  Bed Mobility:     Scooting: supervision  Supine to Sit: supervision  Sit to Supine: supervision  Transfers:     Sit to Stand:  supervision with no AD  Gait: Pt took ~4 side steps and 2-3 steps forward/retro with no AD and with Supervision; pt steady      AM-PAC 6 CLICK MOBILITY  Total Score:19       Treatment & Education:  Pt educated on role of PT/POC and pt agreeable to participate in therapy session  Pt reports being comfortable at rest and pain decreasing  Pt declined sitting up in chair at this time and reports mobility being limited by pain  Educated pt on the importance of upright sitting OOB to chair and HEP / therex to maintain strength  O2 and HR stable during session on 2 L    Patient left HOB elevated with all lines intact, call button in reach, bed alarm on, PCT notified, and spouse present.    GOALS:   Multidisciplinary Problems       Physical Therapy Goals          Problem: Physical Therapy    Goal Priority Disciplines Outcome Interventions   Physical Therapy Goal     PT, PT/OT Progressing    Description: Goals to be met by:  24     Patient will increase functional independence with mobility by performin. Sit to stand transfer with [place]  2. Bed to chair transfer with [place] using No Assistive Device  3. Gait  x 250 feet with [place] using No Assistive Device.                          History:     History reviewed. No pertinent past medical history.    History reviewed. No pertinent surgical history.    Time Tracking:     PT Received On: 24  PT Start Time: 1132     PT Stop Time: 1147  PT Total Time (min): 15 min with OT    Billable Minutes: Evaluation 15      2024

## 2024-11-25 NOTE — PROGRESS NOTES
"[facility] Medicine H&P Note     Admitting Team: [facility] [place] [facility] Hospitalist Team B  Attending Physician: Keyur Pope MD  Resident: Dr. Worthington  Intern: Dr. Phelps    Date of Admit: 11/24/2024    Subjective/Interval History:      Patient doing well this morning. Still with some pleuritic chest pain. Satting 96% on 2L NC. Pulmonology team evaluating pigtail catheter.     Objective     Physical Examination:  /64   Pulse (!) 57   Temp 98.5 °F (36.9 °C) (Oral)   Resp 18   Ht 5' 3" (1.6 m)   Wt 51.7 kg (113 lb 15.7 oz)   SpO2 (!) 94%   BMI 20.19 kg/m²    General: Patient sitting comfortably in NAD  Head: normocephalic, atraumatic  Neck: No thyromegaly or masses   Cardiac: RRR, no murmurs appreciated, no extra heart sounds  Pulmonary/Chest: CTAB, symmetric chest rise, no wheezing or crackles, Left lateral pleural catheter in place  Extremities: no edema, clubbing, or cyanosis  Skin: dry, warm, intact. No bruising or rashes.  Neuro: Alert and oriented, sensation equal and symmetric    Laboratory:  Recent Labs   Lab 11/24/24  1949 11/24/24  2349 11/25/24  0428   WBC 11.96  --  13.10*   HGB 13.9  --  12.3     --  225   MCV 93  --  91     --  136   K 4.3  --  4.3     --  108   CO2 20*  --  19*   BUN 16  --  12   CREATININE 0.7  --  0.6   GLU 80  --  119*   CALCIUM 9.5  --  8.5*   PROT 7.1  --   --    ALBUMIN 4.3  --   --    AST 14  --   --    ALT 14  --   --    ALKPHOS 95  --   --    TROPONINI <0.006 <0.006  --    BNP 22  --   --        Microbiology Data:   none    EKG Data:   Normal sinus rhythm    Radiology Data:   CXR 11/24: 1. Large left pneumothorax, rightward mediastinal shift may reflect pneumothorax under tension.  Correlation and follow-up is advised.  2. Interstitial findings suggest edema noting likely underlying emphysematous change.  CXR 11/24: Repeat with significant reduction of left large pneumothorax      Assessment/Plan     Christin Luna is a 64 y.o. female with a significant " smoking history and h/o spontaneous pneumothorax s/p pigtail catheter 11/10 who presented on 11/24/2024 for pleuritic chest pain. CXR in ED revealed a left sided tension PTX, likely new and larger than prior admission. Pigtail catheter was placed.  Patient is admitted to [facility] for management of pneumothorax.     Large Left-sided Pneumothorax   Hx of Recent Pneumothorax s/p chest tube   Emphysema   - Prior admission 11/10 for small-moderate PTX in [facility]   - Troponin; BNP wnl   - Left sided pigtail placed in ER with near complete resolution of pneumothorax per repeat chest X-ray.   - Prior admission responded well to oxycodone and tylenol PRN, reordered this admission   - Pulmonology consulted for chest tube management and further evaluation   - Home meds: Tiotropium Olodaterol, Albuerol, Duoneb (does not have nebulizer machine ordered last visit)   - Duonebs PRN ordered  - Given 2nd visit for Pneumothorax, consider thoracic surgery evaluation    - Pulmonology follow up scheduled for 11/27/24 with Dr. Rowan     Hypotension, resolved   - MAPS dropped to 40s following slow IV morphine push after chest tube  - Increased to MAP 80s following bolus IVF  - Will continue to monitor at this time      Chronic cough in association with significant smoking history   Likely undiagnosed COPD  - Pulmonology follow up Dr. Rowan scheduled for 11/27  -CT Chest without contrast 11/10 showed diffuse centrilobular and paraseptal emphysematous change, fibronodular biapical scarring, scattered micronodules, and subcutaneous emphysema   -PFTs outpatient      Chronic Tobacco use   -patient with 25+ pack year history, patient quit since prior admission  - patient did not tolerate nicotine patch outpatient; consider Chantix on discharge   -Smoking cessation counseling     #Healthcare Maintenance   -A1c 5.4 11/2024   -lipid panel with cholesterol 228, .8, HDL 58     Health Care Maintenance  - Influenza and pneumococcal vaccines  NOT UTD  - Colonoscopy: - not UTD   - Pap smear: Not UTD  - Mammogram: Not UTD  -primary care provider is Arthur Bauman MD , next appointment not until 5/20, will need sooner follow up     Diet: regular  VTE PPx: lovenox  Code Status: Full code    Dispo: home pending pulmonology evaluation   Estimated LOS: 24-48 hours     Sunitha Phelps MD   [facility], PGY-I

## 2024-11-25 NOTE — PLAN OF CARE
Pt would benefit from cont OT services in order to maximize functional independence. Recommending TBD pending progress, likely home no needs. Pt limited this date by pain at chest tube site. Pt performing bed mobility & STS w/side steps to HOB Sup & no AD. Will progress as able.    Problem: Occupational Therapy  Goal: Occupational Therapy Goal  Description: Goals to be met by: 12/25/2024     Patient will increase functional independence with ADLs by performing:    LE Dressing with Modified [place].  Toileting from toilet with Modified [place] for hygiene and clothing management.   Toilet transfer to toilet with Modified [place].    Outcome: Progressing

## 2024-11-25 NOTE — PT/OT/SLP EVAL
Occupational Therapy   Evaluation    Name: Christin Luna  MRN: [id number]  Admitting Diagnosis: <principal problem not specified>  Recent Surgery: * No surgery found *      Recommendations:     Discharge Recommendations:  (TBD)  Discharge Equipment Recommendations:  other (see comments) (TBD)  Barriers to discharge:  Other (Comment) (Pt requires increased level of assist)    Assessment:     Christin Luna is a 64 y.o. female with a medical diagnosis of <principal problem not specified>.  She presents with The primary encounter diagnosis was Pneumothorax, unspecified type. Diagnoses of Chest pain and Chest tube in place were also pertinent to this visit. Performance deficits affecting function: pain, impaired skin, impaired endurance, impaired self care skills, impaired functional mobility.      Rehab Prognosis: Good; patient would benefit from acute skilled OT services to address these deficits and reach maximum level of function.       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  Patient found supine with bed alarm, chest tube upon OT entry to room.    General  Precautions: Standard, fall  Orthopedic Precautions: N/A  Braces: N/A  Respiratory Status: Nasal cannula, flow 2 L/min    Occupational Performance:    Bed Mobility:    Patient completed Scooting/Bridging with supervision  Patient completed Supine to Sit with supervision  Patient completed Sit to Supine with supervision    Functional Mobility/Transfers:  Patient completed Sit <> Stand Transfer with supervision  with  no assistive device   Functional Mobility: Pt side steps to HOBs & steps forwards/backwards with Sup & no AD.    Cognitive/Visual Perceptual:  Cognitive/Psychosocial Skills:     -       Oriented to: Person, Place, Time, and Situation   -       Follows Commands/attention:Follows multistep  commands  -       Memory: No Deficits noted  -       Mood/Affect/Coping skills/emotional control: Cooperative and Pleasant    Physical Exam:  Sensation:    -       Intact  light/touch BUE   Strength:    -       Right Upper Extremity: WFL  -       Left Upper Extremity: WFL  Limited testing 2/2 pain     AMPAC 6 Click ADL:  AMPAC Total Score: 22    Treatment & Education:  Pt would benefit from cont OT services in order to maximize functional independence.   Pt limited this date by pain at chest tube site.   Pt performing bed mobility & STS w/side steps to HOB Sup & no AD.   Pt encouraged to sit up in chair w/staff as able.   Will progress as able.    Patient left HOB elevated with all lines intact, call button in reach, bed alarm on, nsg notified, and spouse present    GOALS:   Multidisciplinary Problems       Occupational Therapy Goals          Problem: Occupational Therapy    Goal Priority Disciplines Outcome Interventions   Occupational Therapy Goal     OT, PT/OT Progressing    Description: Goals to be met by: 12/25/2024     Patient will increase functional independence with ADLs by performing:    LE Dressing with Modified [place].  Toileting from toilet with Modified [place] for hygiene and clothing  management.   Toilet transfer to toilet with Modified [place].                         History:     History reviewed. No pertinent past medical history.    History reviewed. No pertinent surgical history.    Time Tracking:     OT Date of Treatment: 11/25/24  OT Start Time: 1131  OT Stop Time: 1146  OT Total Time (min): 15 min    Billable Minutes:Evaluation 15 w/PT    11/25/2024

## 2024-11-25 NOTE — ED NOTES
V/S are as followed at this time. B/P- 168/88, HR-69, 98% on 4L NC, RR 18.   Patient remained AAOx4 during begin of procedure and at this time as well.

## 2024-11-25 NOTE — ED NOTES
Air released from insertion at this time by Dr. Patel  V/S are as followed BP-177/86, HR-73, 97% on 4L NC.   Same staff as mentioned in previous noted at bedside at this time.

## 2024-11-25 NOTE — H&P
[facility] Medicine H&P Note     Admitting Team: [facility] [place] [facility] Hospitalist Team B  Attending Physician: Keyur Pope MD  Resident: MD Elin  Intern: MD Lenin   Date of Admit: 11/24/2024    Chief Complaint     Chest pain and SOB     Subjective:      History of Present Illness:  Christin Luna is a 64 y.o. female with past medical history of  has no past medical history on file.. The patient presented to [facility] on 11/24/2024 with a primary complaint of chest pain for 1 day.    Patient was admitted to [facility] on 11/10 for sudden left sided chest pain that radiated ot her neck, followed by pleuritic chest pain and SOB. She was diagnosed with small-moderated left sided PTX, requiring left-sided pigtail catheter and pulmonology evaluation. Patient was discharged with plan for pulmonology evaluation.     She remained at her baseline until 13:30 on 11/24, where patient reported left-sided chest pain that radiated to her left arm, back, and neck. The pain was present at rest and not triggered by exertion.  She initially tried to lay down but pain was worsening upon inspiration. She also had noticed that she developed general weakness with the pain. Family witnessed patient's presentation and brought her to the ED.   Denies chest trauma. Denies cough, fever, chills, nausea, vomiting. No recent travel since discharge.     In the ED, patient received a pigtail catheter in left side, and currently reports relief of shoulder, neck, back pain. She denied SOB on 4L NC.  Patient was weaned off O2 during admission.  She only is complaining of fatigue from not sleeping and chest pain in chest tube site.     Past Medical History:  Left-sided Pneumothorax - 11/10/24  Chronic Tobacco use     Past Surgical History:  History reviewed. No pertinent surgical history.    Allergies:  Review of patient's allergies indicates:  No Known Allergies    Home Medications:  Prior to Admission medications   "  Medication Sig Start Date End Date Taking? Authorizing Provider   albuterol sulfate (PROAIR RESPICLICK) 90 mcg/actuation inhaler Inhale 1 puff into the lungs every 6 (six) hours as needed for Wheezing or Shortness of Breath. Rescue 24   Юиля Garvey MD   albuterol-ipratropium (DUO-NEB) 2.5 mg-0.5 mg/3 mL nebulizer solution Take 3 mLs by nebulization every 6 (six) hours as needed for Wheezing. Rescue 24  Юлия Garvey MD   naproxen sodium (ALEVE ORAL) Take 1-2 tablets by mouth as needed.    Provider, Historical   nebulizer accessories Kit 1 kit by Misc.(Non-Drug; Combo Route) route every 6 (six) hours as needed (SOB, wheezing). 24   Юлия Garvey MD   nebulizer and compressor Iraida 1 Units by Misc.(Non-Drug; Combo Route) route every 6 (six) hours as needed (wheezing, SOB). 24   Юлия Garvey MD   tiotropium-olodateroL (STIOLTO RESPIMAT) 2.5-2.5 mcg/actuation Mist Inhale 1 puff into the lungs once daily - Controller 24   Gregory Morgan MD       Family History:  No family history on file.    Social History:  Social History     Tobacco Use    Smoking status: Former     Current packs/day: 1.00     Average packs/day: 1 pack/day for 34.9 years (34.9 ttl pk-yrs)     Types: Cigarettes     Start date: 1990    Smokeless tobacco: Never   Substance Use Topics    Alcohol use: Never    Drug use: Not Currently       Review of Systems:  Pertinent items are noted in HPI. All other systems are reviewed and are negative.    Health Maintaince :   Primary Care Physician: Arthur Bauman MD     Immunizations:   TDap UTD   Flu - Not UTD    Pna - Not UTD   Cancer Screening: not UTD     Objective:   Last 24 Hour Vital Signs:  BP  Min: 65/40  Max: 155/83  Temp  Av.2 °F (36.8 °C)  Min: 98.2 °F (36.8 °C)  Max: 98.2 °F (36.8 °C)  Pulse  Av.4  Min: 53  Max: 75  Resp  Av.4  Min: 16  Max: 29  SpO2  Av.6 %  Min: 92 %  Max: 99 %  Height  Av' 3" (160 cm)  Min: 5' 3" (160 " "cm)  Max: 5' 3" (160 cm)  Weight  Av.7 kg (114 lb)  Min: 51.7 kg (114 lb)  Max: 51.7 kg (114 lb)  Body mass index is 20.19 kg/m².  No intake/output data recorded.    Physical Examination:  Examination  General: Patient sitting comfortably in NAD  Head: normocephalic, atraumatic  Neck: No thyromegaly or masses   Cardiac: RRR, no murmurs appreciated, no extra heart sounds  Pulmonary/Chest: CTAB, symmetric chest rise, no wheezing or crackles, Left lateral pleural catheter in place  Extremities: no edema, clubbing, or cyanosis  Skin: dry, warm, intact. No bruising or rashes.  Neuro: Alert and oriented, sensation equal and symmetric       Laboratory:  Most Recent Data:  CBC:   Lab Results   Component Value Date    WBC 11.96 2024    HGB 13.9 2024    HCT 40.8 2024     2024    MCV 93 2024    RDW 12.8 2024     BMP:   Lab Results   Component Value Date     2024    K 4.3 2024     2024    CO2 20 (L) 2024    BUN 16 2024    CREATININE 0.7 2024    GLU 80 2024    CALCIUM 9.5 2024    MG 2.0 2024    PHOS 3.5 2024     LFTs:   Lab Results   Component Value Date    PROT 7.1 2024    ALBUMIN 4.3 2024    BILITOT 0.2 2024    AST 14 2024    ALKPHOS 95 2024    ALT 14 2024     Coags: No results found for: "INR", "PROTIME", "PTT"  FLP:   Lab Results   Component Value Date    CHOL 228 (H) 2024    HDL 58 2024    LDLCALC 141.8 2024    TRIG 141 2024    CHOLHDL 25.4 2024     DM:   Lab Results   Component Value Date    HGBA1C 5.4 2024    LDLCALC 141.8 2024    CREATININE 0.7 2024     Thyroid: No results found for: "TSH", "FREET4", "Z5OQWCW", "N6ZOHSA", "THYROIDAB"  Anemia: No results found for: "IRON", "TIBC", "FERRITIN", "QMXDKPQI40", "FOLATE"  Cardiac:   Lab Results   Component Value Date    TROPONINI <0.006 2024    BNP 22 2024 " "    Urinalysis: No results found for: "LABURIN", "COLORU", "PHUA", "CLARITYU", "SPECGRAV", "LABSPEC", "NITRITE", "PROTEINUR", "GLUCOSEU", "KETONESU", "UROBILINOGEN", "BILIRUBINUR", "BLOODU", "RBCU", "WBCUA"    Trended Lab Data:  Recent Labs   Lab 11/24/24 1949   WBC 11.96   HGB 13.9   HCT 40.8      MCV 93   RDW 12.8      K 4.3      CO2 20*   BUN 16   CREATININE 0.7   GLU 80   PROT 7.1   ALBUMIN 4.3   BILITOT 0.2   AST 14   ALKPHOS 95   ALT 14       Trended Cardiac Data:  Recent Labs   Lab 11/24/24 1949   TROPONINI <0.006   BNP 22       Microbiology Data:  N/A     Other Results:  EKG (my interpretation): normal EKG, normal sinus rhythm, unchanged from previous tracings.    Radiology:  Imaging Results              X-Ray Chest AP Portable (Final result)  Result time 11/24/24 23:49:50      Final result by Kristal Barrett MD (11/24/24 23:49:50)                   Impression:      Significant reduction of the left large pneumothorax following thoracostomy tube placement on the left.    No interval detrimental changes.      Electronically signed by: Kristal Barrett  Date:    11/24/2024  Time:    23:49               Narrative:    EXAMINATION:  XR CHEST AP PORTABLE    CLINICAL HISTORY:  Presence of other specified functional implants    TECHNIQUE:  Single frontal view of the chest was performed.    COMPARISON:  11/24/2024 20:03, 11/20/2024 chest x-rays    FINDINGS:  Cardiac silhouette is stable and nonenlarged.  No mediastinal shift seen.    There is significant reduction in the size of the left large pneumothorax following left thoracostomy tube placement with distal tip of the catheter coiled over the lateral left thorax around the 5th to 6th lateral rib region.  A small pneumothorax remains in the left costophrenic angle.    There is some mild atelectatic lung in the left lung base.  Chronic interstitial opacities are again noted in the apices with areas of lucency compatible with emphysematous " blebs.  There is no acute pleural effusion consolidation.                                        X-Ray Chest AP Portable (Final result)  Result time 11/24/24 20:43:55      Final result by Victor Manuel Willett MD (11/24/24 20:43:55)                   Impression:      This report was flagged in Epic as abnormal.    1. Large left pneumothorax, rightward mediastinal shift may reflect pneumothorax under tension.  Correlation and follow-up is advised.  2. Interstitial findings suggest edema noting likely underlying emphysematous change.      Electronically signed by: Victor Manuel Willett MD  Date:    11/24/2024  Time:    20:43               Narrative:    EXAMINATION:  XR CHEST AP PORTABLE    CLINICAL HISTORY:  Chest Pain;    TECHNIQUE:  Single frontal view of the chest was performed.    COMPARISON:  11/20/2024    FINDINGS:  The cardiomediastinal silhouette is not enlarged.  There is no pleural effusion.  The trachea is midline.  The lungs are asymmetrically expanded, right greater than left with coarse interstitial attenuation throughout the parenchyma..  No large focal consolidation seen.  There is a large left pneumothorax.  There is mediastinal shift to the right, may reflect pneumothorax under tension..  The osseous structures are remarkable for degenerative change..                                           Assessment:     Christin Luna is a 64 y.o. female with PMH left sided spontaneous pneumothorax s/p pigtail catheter 11/10/2024 who presents to [facility] for SOB and left sided chest pain. CXR in ED revealed a left sided tension PTX, likely new and larger than prior admission. Pigtail catheter was placed and patient was admitted to [facility] medicine floor for further evaluation and chest tube management. Pulmonology was consulted.        Plan:     Large Left-sided Pneumothorax (Spontaneous vs Tension)   Hx of Recent Pneumothorax s/p chest tube  Suspect ruptured bleb from emphysema given smoking status   - Confirmed with  CXR; large TERENCE PTX  - Prior admission 11/10 for small-moderate PTX in LLL   - Troponin; BNP wnl   - Left sided pigtail placed in ER with near complete resolution of pneumothorax per repeat chest X-ray.   - Prior admission responded well to oxycodone and tylenol PRN,       reordered this admission   - Pulmonology consulted for chest tube management and further evaluation   - Home meds: Tiotropium Olodaterol, Albuerol, Duoneb (does not have nebulizer machine ordered last visit)   - Duonebs PRN ordered  - Given 2nd visit for Pneumothorax, consider thoracic surgery evaluation    - Pulmonology follow up scheduled for 11/27/24     Hypotension, resolved   - MAPS dropped to 40s following slow IV morphine push after chest tube  - Increased to MAP 80s following bolus IVF  - Will continue to monitor at this time      #Chronic cough in association with significant smoking history   #Likely undiagnosed COPD  - Pulmonology follow up stated as above   -CT Chest without contrast 11/10 showed diffuse subcutaneous emphysema   -PFTs outpatient     Chronic Tobacco use   -patient with 25+ pack year history, patient quit since prior admission  - patient did not tolerate nicotine patch outpatient; consider Chantix on discharge   -Smoking cessation counseling     #Healthcare Maintenance   -A1c 5.4 11/2024   -lipid panel with cholesterol 228, .8, HDL 58    Health Care Maintenance  - Influenza and pneumococcal vaccines NOT UTD  - Colonoscopy: - not UTD   - Pap smear: Not UTD  - Mammogram: Not UTD    Code Status: Full  DVT Prophylaxis: LVX  Diet: Regular   Disposition: Pending Pulm eval, likely discharge 1-2 days     Suresh Resendez MD  [facility] Internal Medicine Resident, PGY-2     [facility] Medicine Hospitalist Pager numbers:   [facility] Hospitalist Medicine Team A (Annette/Shalom): [phone]  [facility] Hospitalist Medicine Team B (Nery/Toshia):  [phone]

## 2024-11-25 NOTE — ED NOTES
V/S are as followed- BP-172/94, HR-73, 93% on 4L NC.     Staff mentioned in previous noted at bedside at this time. Almaz (Paramedic at bedside at this time as well).

## 2024-11-25 NOTE — ED NOTES
Pt consumed 75% of breakfast. Requesting to go to bathroom. Upon assisting to toilet, noted chest tube connected to low suction. Per patient, resident placed chest tube to wall suction stating MD wanted chest tube connected to suction to help re inflate lung. Pt assisted to bathroom via wheelchair, continuous O2 in place. SOB noted with moderate exertion. Pt c/o pain 4/10 to left midaxillary site. Drsg CDI. Pt VSS and in no visible distress at this time.

## 2024-11-25 NOTE — ED NOTES
Patient reports she is currently having chest pain to left anterior chest, lower back bilateral, left neck, and lower abdomen. Patient reports she had a spontaneous pneumothorax on Nov. 10 where she was hospitalized for 3 days. Patient denies any PMH. Patient denies any other needs at this time.

## 2024-11-25 NOTE — ED NOTES
Patient provided water and cranberry juice at this time per request of patient. Patient denies any other needs at this time.

## 2024-11-25 NOTE — ED NOTES
Dilator being placed at this time by Dr. Patel.     Staff as mentioned in previous note at bedside at this time.

## 2024-11-26 ENCOUNTER — ANESTHESIA EVENT (OUTPATIENT)
Dept: SURGERY | Facility: HOSPITAL | Age: 64
End: 2024-11-26
Payer: COMMERCIAL

## 2024-11-26 ENCOUNTER — HOSPITAL ENCOUNTER (INPATIENT)
Facility: HOSPITAL | Age: 64
LOS: 4 days | Discharge: HOME | DRG: 165 | End: 2024-11-30
Attending: HOSPITALIST | Admitting: HOSPITALIST
Payer: COMMERCIAL

## 2024-11-26 VITALS
RESPIRATION RATE: 10 BRPM | TEMPERATURE: 98 F | HEIGHT: 63 IN | DIASTOLIC BLOOD PRESSURE: 67 MMHG | HEART RATE: 59 BPM | BODY MASS INDEX: 20.2 KG/M2 | OXYGEN SATURATION: 96 % | SYSTOLIC BLOOD PRESSURE: 132 MMHG | WEIGHT: 114 LBS

## 2024-11-26 DIAGNOSIS — J43.2 CENTRILOBULAR EMPHYSEMA: ICD-10-CM

## 2024-11-26 DIAGNOSIS — J93.9 PNEUMOTHORAX: ICD-10-CM

## 2024-11-26 DIAGNOSIS — J93.0 SPONTANEOUS TENSION PNEUMOTHORAX: Primary | ICD-10-CM

## 2024-11-26 DIAGNOSIS — R07.1 CHEST PAIN ON BREATHING: ICD-10-CM

## 2024-11-26 DIAGNOSIS — R07.9 CHEST PAIN: ICD-10-CM

## 2024-11-26 LAB
ANION GAP SERPL CALC-SCNC: 9 MMOL/L (ref 8–16)
BASOPHILS # BLD AUTO: 0.08 K/UL (ref 0–0.2)
BASOPHILS NFR BLD: 1.1 % (ref 0–1.9)
BUN SERPL-MCNC: 11 MG/DL (ref 8–23)
CALCIUM SERPL-MCNC: 9 MG/DL (ref 8.7–10.5)
CHLORIDE SERPL-SCNC: 107 MMOL/L (ref 95–110)
CO2 SERPL-SCNC: 21 MMOL/L (ref 23–29)
CREAT SERPL-MCNC: 0.7 MG/DL (ref 0.5–1.4)
DIFFERENTIAL METHOD BLD: NORMAL
EOSINOPHIL # BLD AUTO: 0.2 K/UL (ref 0–0.5)
EOSINOPHIL NFR BLD: 2.8 % (ref 0–8)
ERYTHROCYTE [DISTWIDTH] IN BLOOD BY AUTOMATED COUNT: 12.8 % (ref 11.5–14.5)
EST. GFR  (NO RACE VARIABLE): >60 ML/MIN/1.73 M^2
GLUCOSE SERPL-MCNC: 104 MG/DL (ref 70–110)
HCT VFR BLD AUTO: 38.4 % (ref 37–48.5)
HGB BLD-MCNC: 12.8 G/DL (ref 12–16)
IMM GRANULOCYTES # BLD AUTO: 0.02 K/UL (ref 0–0.04)
IMM GRANULOCYTES NFR BLD AUTO: 0.3 % (ref 0–0.5)
LYMPHOCYTES # BLD AUTO: 2.9 K/UL (ref 1–4.8)
LYMPHOCYTES NFR BLD: 38 % (ref 18–48)
MCH RBC QN AUTO: 30.8 PG (ref 27–31)
MCHC RBC AUTO-ENTMCNC: 33.3 G/DL (ref 32–36)
MCV RBC AUTO: 93 FL (ref 82–98)
MONOCYTES # BLD AUTO: 0.6 K/UL (ref 0.3–1)
MONOCYTES NFR BLD: 8.5 % (ref 4–15)
NEUTROPHILS # BLD AUTO: 3.7 K/UL (ref 1.8–7.7)
NEUTROPHILS NFR BLD: 49.3 % (ref 38–73)
NRBC BLD-RTO: 0 /100 WBC
PLATELET # BLD AUTO: 220 K/UL (ref 150–450)
PMV BLD AUTO: 11 FL (ref 9.2–12.9)
POTASSIUM SERPL-SCNC: 4.7 MMOL/L (ref 3.5–5.1)
RBC # BLD AUTO: 4.15 M/UL (ref 4–5.4)
SODIUM SERPL-SCNC: 137 MMOL/L (ref 136–145)
WBC # BLD AUTO: 7.55 K/UL (ref 3.9–12.7)

## 2024-11-26 PROCEDURE — 94761 N-INVAS EAR/PLS OXIMETRY MLT: CPT

## 2024-11-26 PROCEDURE — 85025 COMPLETE CBC W/AUTO DIFF WBC: CPT

## 2024-11-26 PROCEDURE — 27000221 HC OXYGEN, UP TO 24 HOURS

## 2024-11-26 PROCEDURE — 25000003 PHARM REV CODE 250

## 2024-11-26 PROCEDURE — 80048 BASIC METABOLIC PNL TOTAL CA: CPT

## 2024-11-26 PROCEDURE — 25000003 PHARM REV CODE 250: Performed by: INTERNAL MEDICINE

## 2024-11-26 PROCEDURE — 99900035 HC TECH TIME PER 15 MIN (STAT)

## 2024-11-26 PROCEDURE — 0B5P4ZZ DESTRUCTION OF LEFT PLEURA, PERCUTANEOUS ENDOSCOPIC APPROACH: ICD-10-PCS | Performed by: STUDENT IN AN ORGANIZED HEALTH CARE EDUCATION/TRAINING PROGRAM

## 2024-11-26 PROCEDURE — 36415 COLL VENOUS BLD VENIPUNCTURE: CPT

## 2024-11-26 PROCEDURE — 99223 1ST HOSP IP/OBS HIGH 75: CPT | Mod: 57,,, | Performed by: STUDENT IN AN ORGANIZED HEALTH CARE EDUCATION/TRAINING PROGRAM

## 2024-11-26 PROCEDURE — 63600175 PHARM REV CODE 636 W HCPCS: Performed by: INTERNAL MEDICINE

## 2024-11-26 PROCEDURE — 20600001 HC STEP DOWN PRIVATE ROOM

## 2024-11-26 PROCEDURE — 3E0L4GC INTRODUCTION OF OTHER THERAPEUTIC SUBSTANCE INTO PLEURAL CAVITY, PERCUTANEOUS ENDOSCOPIC APPROACH: ICD-10-PCS | Performed by: STUDENT IN AN ORGANIZED HEALTH CARE EDUCATION/TRAINING PROGRAM

## 2024-11-26 RX ORDER — ACETAMINOPHEN 325 MG/1
650 TABLET ORAL EVERY 6 HOURS PRN
Status: DISCONTINUED | OUTPATIENT
Start: 2024-11-26 | End: 2024-11-30 | Stop reason: HOSPADM

## 2024-11-26 RX ORDER — ENOXAPARIN SODIUM 100 MG/ML
40 INJECTION SUBCUTANEOUS EVERY 24 HOURS
Status: DISCONTINUED | OUTPATIENT
Start: 2024-11-26 | End: 2024-11-27

## 2024-11-26 RX ORDER — OXYCODONE HYDROCHLORIDE 5 MG/1
5 TABLET ORAL EVERY 6 HOURS PRN
Status: DISCONTINUED | OUTPATIENT
Start: 2024-11-26 | End: 2024-11-27

## 2024-11-26 RX ORDER — IPRATROPIUM BROMIDE AND ALBUTEROL SULFATE 2.5; .5 MG/3ML; MG/3ML
3 SOLUTION RESPIRATORY (INHALATION) EVERY 6 HOURS PRN
Status: DISCONTINUED | OUTPATIENT
Start: 2024-11-26 | End: 2024-11-27

## 2024-11-26 RX ADMIN — OXYCODONE 5 MG: 5 TABLET ORAL at 08:11

## 2024-11-26 RX ADMIN — OXYCODONE 5 MG: 5 TABLET ORAL at 05:11

## 2024-11-26 RX ADMIN — DOCUSATE SODIUM 100 MG: 100 CAPSULE, LIQUID FILLED ORAL at 08:11

## 2024-11-26 RX ADMIN — ENOXAPARIN SODIUM 40 MG: 40 INJECTION SUBCUTANEOUS at 05:11

## 2024-11-26 NOTE — NURSING
Report called to floor nurse Kelsie at [facility] at this time. All questions answered at this time. Pt updated on POC and verbalized understanding. No acute changes or distress noted at this time.

## 2024-11-26 NOTE — DISCHARGE SUMMARY
"  [facility] Medicine Discharge Summary    Primary Team: [facility] Hospitalist Team B  Attending Physician:Toshia   Resident: Elin   Intern: Lenin     Date of Admit: 11/24/2024  Date of Discharge: 11/26/2024    Discharge to: [facility]   Condition: Stable     Discharge Diagnoses     Patient Active Problem List   Diagnosis    Smoker    Tobacco dependency    Centrilobular emphysema    Pneumothorax       Consultants and Procedures     Consultants:  Pulm     Procedures:   Chest tube placement (11/24)     Imaging:  X-Ray Chest AP Portable   Final Result      As above.         Electronically signed by: Patrick Martines   Date:    11/26/2024   Time:    10:07      X-Ray Chest AP Portable   Final Result      Significant reduction of the left large pneumothorax following thoracostomy tube placement on the left.      No interval detrimental changes.         Electronically signed by: Kristal Barrett   Date:    11/24/2024   Time:    23:49      X-Ray Chest AP Portable   Final Result   Abnormal      This report was flagged in Epic as abnormal.      1. Large left pneumothorax, rightward mediastinal shift may reflect pneumothorax under tension.  Correlation and follow-up is advised.   2. Interstitial findings suggest edema noting likely underlying emphysematous change.         Electronically signed by: Victor Manuel Willett MD   Date:    11/24/2024   Time:    20:43            Brief History of Present Illness   Per original H&P from Dr. Resendez     " Christin Luna is a 64 y.o. female with past medical history of  has no past medical history on file.. The patient presented to [facility] on 11/24/2024 with a primary complaint of chest pain for 1 day.     Patient was admitted to [facility] on 11/10 for sudden left sided chest pain that radiated ot her neck, followed by pleuritic chest pain and SOB. She was diagnosed with small-moderated left sided PTX, requiring left-sided pigtail catheter and pulmonology " "evaluation. Patient was discharged with plan for pulmonology evaluation.      She remained at her baseline until 13:30 on 11/24, where patient reported left-sided chest pain that radiated to her left arm, back, and neck. The pain was present at rest and not triggered by exertion.  She initially tried to lay down but pain was worsening upon inspiration. She also had noticed that she developed general weakness with the pain. Family witnessed patient's presentation and brought her to the ED.   Denies chest trauma. Denies cough, fever, chills, nausea, vomiting. No recent travel since discharge.      In the ED, patient received a pigtail catheter in left side, and currently reports relief of shoulder, neck, back pain. She denied SOB on 4L NC.  Patient was weaned off O2 during admission.  She only is complaining of fatigue from not sleeping and chest pain in chest tube site. "        Hospital Course By Problem with Pertinent Findings        Christin Luna is a 64 y.o. female with a significant smoking history and h/o spontaneous pneumothorax s/p pigtail catheter 11/10 who presented on 11/24/2024 for pleuritic chest pain. CXR in ED revealed a left sided tension PTX, likely new and larger than prior admission. Pigtail catheter was placed.  Patient is admitted to [facility] for management of pneumothorax.     Large Left-sided Pneumothorax   Hx of Recent Pneumothorax s/p chest tube   Emphysema   - Prior admission 11/10 for small-moderate PTX in LLL   - Troponin; BNP wnl   - Left sided pigtail placed in ER with near complete resolution of pneumothorax per repeat chest X-ray.   - Prior admission responded well to oxycodone and tylenol PRN, reordered this admission   - Pulmonology consulted for PTX/ chest tube management- appreciate recs  - Home meds: Tiotropium Olodaterol, Albuerol, Duoneb (does not have nebulizer machine ordered last visit)   - Duonebs PRN ordered  -secondary spontaneous ptx- will require definitive " management w/ cts- transfer to [facility]        Hypotension, resolved   - MAPS dropped to 40s following slow IV morphine push after chest tube  - Increased to MAP 80s following bolus IVF  Stable on admission to floor      Chronic cough in association with significant smoking history   Likely undiagnosed COPD  - Pulmonology follow up Dr. Rowan scheduled for 11/27  -CT Chest without contrast 11/10 showed diffuse centrilobular and paraseptal emphysematous change, fibronodular biapical scarring, scattered micronodules, and subcutaneous emphysema   -PFTs outpatient   - Outpt pulm apt 11/27 tyler/ [facility] - pending rescheduling      Chronic Tobacco use   -patient with 25+ pack year history, patient quit since prior admission  - patient did not tolerate nicotine patch outpatient; consider Chantix on discharge   reports she has stopped smoking, see COPD above      #Healthcare Maintenance   -A1c 5.4 11/2024   -lipid panel with cholesterol 228, .8, HDL 58  Per PCP      Health Care Maintenance  - Influenza and pneumococcal vaccines NOT UTD  - Colonoscopy: - not UTD   - Pap smear: Not UTD  - Mammogram: Not UTD  -primary care provider is Arthur Bauman MD , next appointment not until 5/20  Follow up w/ PCP pending dc from [facility]     Discharge Medications        Medication List        CONTINUE taking these medications      albuterol sulfate 90 mcg/actuation inhaler  Commonly known as: PROAIR RESPICLICK  Inhale 1 puff into the lungs every 6 (six) hours as needed for Wheezing or Shortness of Breath. Rescue     albuterol-ipratropium 2.5 mg-0.5 mg/3 mL nebulizer solution  Commonly known as: DUO-NEB  Take 3 mLs by nebulization every 6 (six) hours as needed for Wheezing. Rescue     ALEVE ORAL     nebulizer accessories Kit  1 kit by Misc.(Non-Drug; Combo Route) route every 6 (six) hours as needed (SOB, wheezing).     nebulizer and compressor Iraida  1 Units by Misc.(Non-Drug; Combo Route) route every 6 (six) hours as  needed (wheezing, SOB).     STIOLTO RESPIMAT 2.5-2.5 mcg/actuation Mist  Generic drug: tiotropium-olodateroL  Inhale 1 puff into the lungs once daily - Controller              Discharge Information:   Diet:  Regular     Physical Activity:   TBD following resolution of acute illness             Instructions:  1. Take all medications as prescribed  2. Keep all follow-up appointments  3. Return to the hospital or call your primary care physicians if any worsening symptoms such as fever, chest pain, shortness of breath, return of symptoms, or any other concerns.    Follow-Up Appointments:  PCP- Mitul 5/20     Aditi Worthington MD  [facility] Internal Medicine, -I

## 2024-11-26 NOTE — NURSING
1003- Pt chest tube unhooked from suction and orders given to guero for low continous suction for transport by me and by karina BRIZUELA at this time. Pt on 2L NC, no needs voiced. No s/s of dsitress noted. Chest tube site clean dry and intact, bloody drainage in chest tube, approx 15ML.

## 2024-11-26 NOTE — PLAN OF CARE
Encouraged pt to call for help with ambulation or change in respiratory status. Oriented pt to the room and routine.

## 2024-11-26 NOTE — Clinical Note
November 30, 2024         [address]  [facility] [address]  Phone: [phone]       Patient: Christin Luna   YOB: 1960  Date of Visit: 11/30/2024    To Whom It May Concern:    Maximiliano Luna  was at [facility] on 11/30/2024. The patient may return to work/school on *** {With/no:99690} restrictions. If you have any questions or concerns, or if I can be of further assistance, please do not hesitate to contact me.    Sincerely,    Geovanna Dolan RN

## 2024-11-26 NOTE — PLAN OF CARE
Acceptance Note:    64-year-old female with a significant smoking history, pneumothorax 2 weeks prior, presented on 11/24/2024 with pleuritic chest pain. ED evaluation revealed a large left-sided tension pneumothorax, larger than the prior. A pigtail catheter was placed. Currently on 2L via NC. Thoracic surgery evaluation was recommended given her recurrent pneumothoraces.    FYI   Mother wants a call back to discuss her daughters recent visit and the 100 paid towards services for a cosmetic procedure  Received a voicemail form Ebonie authorizing us to speak to her mother about the appointment for Dermatology  Gave the information to an

## 2024-11-26 NOTE — PROGRESS NOTES
"[facility] Medicine H&P Note     Admitting Team: [facility] Hospitalist Team B  Attending Physician: Keyur Pope MD  Resident: Dr. Worthington  Intern: Dr. Phelps    Date of Admit: 11/24/2024    Subjective/Interval History:      Patient doing well this morning. Reports persistent. Chest tube on suction- 20 cm H20 w/ minimal blood in chest tube.       Objective     Physical Examination:  /67 (Patient Position: Lying)   Pulse (!) 59   Temp 98.1 °F (36.7 °C) (Oral)   Resp 10   Ht 5' 3" (1.6 m)   Wt 51.7 kg (113 lb 15.7 oz)   SpO2 96%   BMI 20.19 kg/m²    General: Patient sitting comfortably in NAD  Head: normocephalic, atraumatic  Neck: No thyromegaly or masses   Cardiac: RRR, no murmurs appreciated, no extra heart sounds  Pulmonary/Chest: CTA on R, L side w/ rales inferiorly, symmetric chest rise, no wheezingLeft lateral pleural catheter in place  Extremities: no edema, clubbing, or cyanosis  Skin: dry, warm, intact. No bruising or rashes.  Neuro: Alert and oriented, sensation equal and symmetric    Laboratory:  Recent Labs   Lab 11/24/24  1949 11/24/24  2349 11/25/24  0428 11/26/24  0305   WBC 11.96  --  13.10* 7.55   HGB 13.9  --  12.3 12.8     --  225 220   MCV 93  --  91 93     --  136 137   K 4.3  --  4.3 4.7     --  108 107   CO2 20*  --  19* 21*   BUN 16  --  12 11   CREATININE 0.7  --  0.6 0.7   GLU 80  --  119* 104   CALCIUM 9.5  --  8.5* 9.0   PROT 7.1  --   --   --    ALBUMIN 4.3  --   --   --    AST 14  --   --   --    ALT 14  --   --   --    ALKPHOS 95  --   --   --    TROPONINI <0.006 <0.006  --   --    BNP 22  --   --   --        Microbiology Data:   none    EKG Data:   Normal sinus rhythm    Radiology Data:   CXR 11/24: 1. Large left pneumothorax, rightward mediastinal shift may reflect pneumothorax under tension.  Correlation and follow-up is advised.  2. Interstitial findings suggest edema noting likely underlying emphysematous change.  CXR 11/24: Repeat with " significant reduction of left large pneumothorax      Assessment/Plan     Christin Luna is a 64 y.o. female with a significant smoking history and h/o spontaneous pneumothorax s/p pigtail catheter 11/10 who presented on 11/24/2024 for pleuritic chest pain. CXR in ED revealed a left sided tension PTX, likely new and larger than prior admission. Pigtail catheter was placed.  Patient is admitted to [facility] for management of pneumothorax.     Large Left-sided Pneumothorax   Hx of Recent Pneumothorax s/p chest tube   Emphysema   - Prior admission 11/10 for small-moderate PTX in LLL   - Troponin; BNP wnl   - Left sided pigtail placed in ER with near complete resolution of pneumothorax per repeat chest X-ray.   - Prior admission responded well to oxycodone and tylenol PRN, reordered this admission   - Pulmonology consulted for PTX/ chest tube management- appreciate recs  - Home meds: Tiotropium Olodaterol, Albuerol, Duoneb (does not have nebulizer machine ordered last visit)   - Duonebs PRN ordered  - Given 2nd visit for Pneumothorax, consider thoracic surgery evaluation    -secondary spontaneous ptx- will require definitive management w/ cts- accepted at [facility] awaiting transport       Hypotension, resolved   - MAPS dropped to 40s following slow IV morphine push after chest tube  - Increased to MAP 80s following bolus IVF  - Will continue to monitor at this time      Chronic cough in association with significant smoking history   Likely undiagnosed COPD  - Pulmonology follow up Dr. Rowan scheduled for 11/27  -CT Chest without contrast 11/10 showed diffuse centrilobular and paraseptal emphysematous change, fibronodular biapical scarring, scattered micronodules, and subcutaneous emphysema   -PFTs outpatient      Chronic Tobacco use   -patient with 25+ pack year history, patient quit since prior admission  - patient did not tolerate nicotine patch outpatient; consider Chantix on discharge   - reports she has stopped  smoking      #Healthcare Maintenance   -A1c 5.4 11/2024   -lipid panel with cholesterol 228, .8, HDL 58     Health Care Maintenance  - Influenza and pneumococcal vaccines NOT UTD  - Colonoscopy: - not UTD   - Pap smear: Not UTD  - Mammogram: Not UTD  -primary care provider is Arthur Bauman MD , next appointment not until 5/20, will need sooner follow up     Diet: regular  VTE PPx: lovenox  Code Status: Full code    Dispo: awaiting transport to [facility] [address]     Aditi Worthington MD

## 2024-11-26 NOTE — ANESTHESIA PREPROCEDURE EVALUATION
[facility]  Anesthesia Pre-Operative Evaluation         Patient Name: Christin Luna  YOB: 1960  MRN: [id number]    SUBJECTIVE:     Pre-operative Evaluation for Procedure(s) (LRB):  VATS, WITH PLEURODESIS mechanical and chemical pleruodesis (Left)     11/26/2024    Christin Luna is a 64 y.o. female with a PMHx for chronic tobacco use, undiagnosed COPD, h/o recurrent spontaneous pneumothorax s/p pigtail catheter 11/10 who presented on 11/24/2024 to [facility] with L recurrent PTX s/p pig tail chest tube. She was transferred to [facility] for pulmonary evaluation.     Level of Care: [facility]  Respiratory Status: RA  IV Access: PIV x2, 20G   NPO Status: NPOMN    Previous Airway: None documented    LDA: PIV x2, 20G        Peripheral IV - Single Lumen 11/24/24 1928 20 G Anterior;Left;Proximal Forearm (Active)   Site Assessment Clean;Dry;Intact;No redness;No swelling 11/25/24 1945   Extremity Assessment Distal to IV No abnormal discoloration;No redness;No swelling;No warmth 11/25/24 1945   Line Status Saline locked 11/25/24 1945   Dressing Status Clean;Dry;Intact 11/25/24 1945   Dressing Intervention Integrity maintained 11/25/24 1945   Number of days: 1            Peripheral IV - Single Lumen 11/24/24 2348 20 G Anterior;Right Forearm (Active)   Site Assessment Clean;No redness;Intact;Dry;No swelling 11/25/24 1945   Extremity Assessment Distal to IV No warmth;No swelling;No abnormal discoloration;No redness 11/25/24 1945   Line Status Saline locked 11/25/24 1945   Dressing Status Clean;Dry;Intact 11/25/24 1945   Dressing Intervention Integrity maintained 11/25/24 1945   Number of days: 1            Chest Tube 11/24/24 2155 Left Midaxillary (Active)   Chest Tube WDL WDL 11/25/24 1945   Function -20 cm H2O 11/25/24 1700   Air Leak/Fluctuation air leak not present 11/25/24 1945   Safety all connections secured;suction checked 11/25/24 1145   Securement tubing secured to body distal to insertion site with  "sutures 11/25/24 1945   Drainage Description Yellow 11/25/24 1945   Dressing Appearance clean and dry 11/25/24 1700   Site Assessment Clean;Dry;Intact;Sutures intact;No redness;No swelling 11/26/24 0000   Surrounding Skin Intact 11/25/24 1945   Output (mL) 10 mL 11/25/24 1917   Number of days: 1       Drips: None documented        Patient Active Problem List   Diagnosis    Smoker    Tobacco dependency    Centrilobular emphysema    Pneumothorax       Review of patient's allergies indicates:  No Known Allergies    Current Inpatient Medications:   enoxparin  40 mg Subcutaneous Q24H (prophylaxis, 1700)       No past surgical history on file.    Social History     Substance and Sexual Activity   Drug Use Not Currently     Tobacco Use: Medium Risk (11/24/2024)    Patient History     Smoking Tobacco Use: Former     Smokeless Tobacco Use: Never     Passive Exposure: Not on file     Alcohol Use: Not At Risk (11/11/2024)    AUDIT-C     Frequency of Alcohol Consumption: Never     Average Number of Drinks: Patient does not drink     Frequency of Binge Drinking: Never       OBJECTIVE:     Vital Signs Range (Last 24H):  Temp:  [36.7 °C (98 °F)-37 °C (98.6 °F)]   Pulse:  [51-75]   Resp:  [10-18]   BP: (118-144)/(65-77)   SpO2:  [94 %-97 %]       Significant Labs    Heme Profile  Lab Results   Component Value Date    WBC 7.55 11/26/2024    HGB 12.8 11/26/2024    HCT 38.4 11/26/2024     11/26/2024       Coagulation Studies  No results found for: "LABPROT", "INR", "APTT"    Metabolic Profile  Lab Results   Component Value Date     11/26/2024    K 4.7 11/26/2024     11/26/2024    CO2 21 (L) 11/26/2024    BUN 11 11/26/2024    CREATININE 0.7 11/26/2024    MG 2.0 11/12/2024    PHOS 3.5 11/12/2024       Liver Function Tests  Lab Results   Component Value Date    AST 14 11/24/2024    ALT 14 11/24/2024    ALKPHOS 95 11/24/2024    BILITOT 0.2 11/24/2024    PROT 7.1 11/24/2024    ALBUMIN 4.3 11/24/2024       Lipid " Profile  Lab Results   Component Value Date    CHOL 228 (H) 11/11/2024    HDL 58 11/11/2024    TRIG 141 11/11/2024       Endocrine Profile  Lab Results   Component Value Date    HGBA1C 5.4 11/11/2024       Diagnostic Studies      EKG:   Results for orders placed or performed during the hospital encounter of 11/24/24   EKG 12-lead    Collection Time: 11/24/24  7:06 PM   Result Value Ref Range    QRS Duration 86 ms    [facility] QTC Calculation 419 ms    Narrative    Test Reason : R07.9,    Vent. Rate :  71 BPM     Atrial Rate :  71 BPM     P-R Int : 192 ms          QRS Dur :  86 ms      QT Int : 386 ms       P-R-T Axes :  85  83  80 degrees    QTcB Int : 419 ms    Normal sinus rhythm  Normal ECG  When compared with ECG of 10-Nov-2024 09:36,  Premature ventricular complexes are no longer Present  Confirmed by Sudhir Briseno ([id number]) on 11/25/2024 6:36:10 PM    Referred By: AAAREFERRAL SELF           Confirmed By: Sudhir Morel         ASSESSMENT/PLAN:     Christin Luna is a 64 y.o. female with chronic tobacco use, undiagnosed COPD, h/o recurrent spontaneous pneumothorax s/p pigtail catheter 11/10 who presented on 11/24/2024 to [facility] with L recurrent PTX s/p pig tail chest tube. She was transferred to [facility] for pulmonary evaluation.     Pre-op Assessment    I have reviewed the Patient Summary Reports.     I have reviewed the Nursing Notes. I have reviewed the NPO Status.   I have reviewed the Medications.     Review of Systems  Social:  Smoker       Pulmonary:   COPD                         Physical Exam  General: Well nourished, Cooperative, Alert and Oriented    Airway:  Mallampati: III   Mouth Opening: Normal  TM Distance: Normal  Tongue: Normal  Neck ROM: Normal ROM    Dental:  Intact    Chest/Lungs:  Normal Respiratory Rate  L pigtail chest tube  Heart:  Rate: Normal        Anesthesia Plan  Type of Anesthesia, risks & benefits discussed:    Anesthesia Type: Gen ETT  Intra-op Monitoring Plan: Standard ASA  Monitors  Post Op Pain Control Plan: multimodal analgesia and IV/PO Opioids PRN  Induction:  IV  Airway Plan: Direct and Video, Post-Induction  Informed Consent: Informed consent signed with the Patient and all parties understand the risks and agree with anesthesia plan.  All questions answered.   ASA Score: 3  Day of Surgery Review of History & Physical: H&P Update referred to the surgeon/provider.  Anesthesia Plan Notes: Discussed anesthetic plan including one lung ventilation with double lumen ETT or bronchial blocker, intraoperative planning, and possibility of post operative ventilation    Ready For Surgery From Anesthesia Perspective.     .

## 2024-11-26 NOTE — PLAN OF CARE
Problem: Adult Inpatient Plan of Care  Goal: Plan of Care Review  Outcome: Progressing  Goal: Absence of Hospital-Acquired Illness or Injury  Outcome: Progressing     Problem: Breathing Pattern Ineffective  Goal: Effective Breathing Pattern  Outcome: Progressing     Problem: Pain Acute  Goal: Optimal Pain Control and Function  Outcome: Progressing     Problem: Fall Injury Risk  Goal: Absence of Fall and Fall-Related Injury  Outcome: Progressing

## 2024-11-26 NOTE — PLAN OF CARE
Outside Transfer Acceptance Note / [facility] Referral Center    Referring facility: [facility]   Referring provider: THOM MCKEON  Accepting facility: [facility]  Accepting provider: ALLEN CRUM  Admitting provider: Allen Crum   Reason for transfer:  HLOC  Transfer diagnosis: pneumothorax  Transfer specialty requested: Cardiothoracic Surgery  Transfer specialty notified: No  Transfer level: NUMBER 1-5: 2  Bed type requested: med tele  Isolation status: No active isolations   Admission class or status: IP- Inpatient      Narrative     64-year-old female with a significant smoking history, pneumothorax 2 weeks prior, presented on 11/24/2024 with pleuritic chest pain. ED evaluation revealed a large left-sided tension pneumothorax, larger than the prior. A pigtail catheter was placed. Currently on 2L via NC. Thoracic surgery evaluation was recommended given her recurrent pneumothoraces.     Objective     Vitals: Temp: 98 °F (36.7 °C) (11/25/24 2350)  Pulse: (!) 51 (11/26/24 0010)  Resp: 18 (11/25/24 2355)  BP: 131/65 (11/25/24 2350)  SpO2: 95 % (11/25/24 2350)  Recent Labs: All pertinent labs within the past 24 hours have been reviewed.  Recent imaging:    Airway:     Vent settings:         IV access:        Peripheral IV - Single Lumen 11/24/24 1928 20 G Anterior;Left;Proximal Forearm (Active)   Site Assessment Clean;Dry;Intact;No redness;No swelling 11/25/24 1700   Extremity Assessment Distal to IV No abnormal discoloration;No redness;No swelling 11/25/24 0707   Line Status Saline locked 11/25/24 1700   Dressing Status Clean;Dry;Intact 11/25/24 1700   Dressing Intervention Integrity maintained 11/25/24 0707            Peripheral IV - Single Lumen 11/24/24 2348 20 G Anterior;Right Forearm (Active)   Site Assessment Clean;Dry;Intact;No redness;No swelling 11/25/24 1700   Extremity Assessment Distal to IV No abnormal discoloration;No redness;No swelling 11/25/24 0707   Line Status  Saline locked 11/25/24 1700   Dressing Status Clean;Dry;Intact 11/25/24 1700   Dressing Intervention Integrity maintained 11/25/24 0707     Infusions:   Allergies: Review of patient's allergies indicates:  No Known Allergies   NPO: No    Anticoagulation:   Anticoagulants       Ordered     Route Frequency Start Stop    11/25/24 0011  enoxaparin  (VTE Pharmacological Prophylaxis Orders - High Risk)         SubQ Daily 11/25/24 1700 --             Instructions      Tommy Day-  Admit to Hospital Medicine  Upon patient arrival to floor, please send SecureChat to [facility] or call extension [phone] (if no answer, do NOT leave a callback number after the beep, rather please send a SecureChat to [facility]), for Hospital Medicine admit team assignment and for additional admit orders for the patient.  Do not page the attending physician associated with the patient on arrival (this physician may not be on duty at the time of arrival).  Rather, always send a SecureChat to [facility] or call [phone] to reach the triage physician for orders and team assignment.

## 2024-11-26 NOTE — NURSING
Rapid Response Nurse Follow-up Note     Followed up with patient for proactive rounding.     Education completed with pt and her daughter on desired O2 level and NC. No additional questions at this time.     CX tube drsg CDI. Pt states that the site is sore but is not having any trouble breathing or complaints of extreme comfort at this time    No acute issues at this time. Reviewed plan of care with bedside RNCatalina .   Team will continue to follow.  Please call Rapid Response RN, Melody Ramos RN with any questions or concerns at [phone].

## 2024-11-26 NOTE — CONSULTS
Cardiothoracic Surgery Consultation Note  Christin Montgomeryphard  11/26/2024     Reason for Consult: Spontaneous pneumothorax    HPI:  Pt is a 65 yo female with hx of tobacco abuse transferred to [facility] due to recurrent spontaneous pneumothorax. First episode earlier this month which was managed conservatively with chest tube. Recurrence approximately 2 weeks later with large pneumothorax causing chest pains and SOB. Patient initially presented to the ED in [place] and was transferred to [facility] for thoracic surgery evaluation. No other significant medical problems. Does not take medication on a regular basis. No prior surgical hx. Currently denies any chest pains, SOB. VS WNL. On room air. Chest tube with minimal output. Air leak present. Pt quit smoking the day prior to her first episode earlier this month.      ROS: Negative 12 point ROS    PMH: Tobacco abuse  PSH: Denies  Social History     Socioeconomic History    Marital status:    Occupational History    Occupation:    Tobacco Use    Smoking status: Former     Current packs/day: 1.00     Average packs/day: 1 pack/day for 34.9 years (34.9 ttl pk-yrs)     Types: Cigarettes     Start date: 1/1/1990    Smokeless tobacco: Never   Substance and Sexual Activity    Alcohol use: Never    Drug use: Not Currently    Sexual activity: Not Currently     Social Drivers of Health     Financial Resource Strain: Medium Risk (11/25/2024)    Overall Financial Resource Strain (CARDIA)     Difficulty of Paying Living Expenses: Somewhat hard   Food Insecurity: No Food Insecurity (11/25/2024)    Hunger Vital Sign     Worried About Running Out of Food in the Last Year: Never true     Ran Out of Food in the Last Year: Never true   Recent Concern: Food Insecurity - Food Insecurity Present (11/10/2024)    Hunger Vital Sign     Worried About Running Out of Food in the Last Year: Sometimes true     Ran Out of Food in the Last Year: Sometimes true   Transportation Needs: No  "Transportation Needs (11/25/2024)    TRANSPORTATION NEEDS     Transportation : No   Physical Activity: Sufficiently Active (11/11/2024)    Exercise Vital Sign     Days of Exercise per Week: 6 days     Minutes of Exercise per Session: 40 min   Stress: No Stress Concern Present (11/25/2024)    [employer] [place] [employer] - Occupational Stress Questionnaire     Feeling of Stress : Not at all   Housing Stability: Low Risk  (11/25/2024)    Housing Stability Vital Sign     Unable to Pay for Housing in the Last Year: No     Homeless in the Last Year: No     Review of patient's allergies indicates:  No Known Allergies     Physical Exam:  Vitals:    11/26/24 1145 11/26/24 1255 11/26/24 1456 11/26/24 1532   BP:    120/75   BP Location:    Right arm   Patient Position:    Sitting   Pulse:  70 75 72   Resp:    18   Temp:    98.2 °F (36.8 °C)   TempSrc:    Oral   SpO2:    (!) 91%   Weight: 51.7 kg (113 lb 15.7 oz)      Height: 5' 3" (1.6 m)           Gen: NAD, AAOx3  HEENT: NCAT, trachea midline  Cardio: RRR by peripheral pulse, HDS  Resp: NLB on RA. Pigtail catheter in place to L chest. Air leak present with coughing and movement.   Abd: Soft, NT, ND  Ext: No s/e  Neuro: No motor or sensory deficits    Labs:  Recent Labs   Lab 11/24/24  1949 11/25/24  0428 11/26/24  0305   WBC 11.96 13.10* 7.55   HGB 13.9 12.3 12.8   HCT 40.8 35.7* 38.4    225 220    136 137   K 4.3 4.3 4.7    108 107   CO2 20* 19* 21*   BUN 16 12 11   BILITOT 0.2  --   --    AST 14  --   --    ALT 14  --   --    ALKPHOS 95  --   --    ALBUMIN 4.3  --   --        Imaging:  CXR (11/26/24):  FINDINGS:  Mediastinal structures remain midline.  Stable size cardiac silhouette.  Stable position of left-sided chest tube noting interval improvement of previous left-sided pneumothorax.  No significant residual left-sided pneumothorax identified.  There is slight elevation of left hemidiaphragm with left basilar subsegmental atelectasis.  " Remaining lungs are clear.  No significant pleural fluid.  No right-sided pneumothorax.  Small amount of subcutaneous air about left-sided chest tube insertion site.       Assessment/Plan:  Pt is a 65 yo female with recurrent spontaneous pneumothorax.     - Will plan for OR tomorrow for VATS with pleurodesis  - Risks and benefits of continued conservative management versus VATS with pleurodesis discussed with patient. Informed consent obtained.   - NPO at midnight  - T&S   - Please call or page with any questions    Beverley Smith MD  PGY-6 CTS Fellow

## 2024-11-27 ENCOUNTER — ANESTHESIA (OUTPATIENT)
Dept: SURGERY | Facility: HOSPITAL | Age: 64
End: 2024-11-27
Payer: COMMERCIAL

## 2024-11-27 LAB
ABO + RH BLD: NORMAL
ALBUMIN SERPL BCP-MCNC: 3.5 G/DL (ref 3.5–5.2)
ALP SERPL-CCNC: 81 U/L (ref 40–150)
ALT SERPL W/O P-5'-P-CCNC: 10 U/L (ref 10–44)
ANION GAP SERPL CALC-SCNC: 6 MMOL/L (ref 8–16)
APTT PPP: 28.6 SEC (ref 21–32)
AST SERPL-CCNC: 12 U/L (ref 10–40)
BASOPHILS # BLD AUTO: 0.04 K/UL (ref 0–0.2)
BASOPHILS NFR BLD: 0.6 % (ref 0–1.9)
BILIRUB SERPL-MCNC: 0.5 MG/DL (ref 0.1–1)
BLD GP AB SCN CELLS X3 SERPL QL: NORMAL
BUN SERPL-MCNC: 13 MG/DL (ref 8–23)
CALCIUM SERPL-MCNC: 9.1 MG/DL (ref 8.7–10.5)
CHLORIDE SERPL-SCNC: 106 MMOL/L (ref 95–110)
CO2 SERPL-SCNC: 24 MMOL/L (ref 23–29)
CREAT SERPL-MCNC: 0.7 MG/DL (ref 0.5–1.4)
DIFFERENTIAL METHOD BLD: ABNORMAL
EOSINOPHIL # BLD AUTO: 0.3 K/UL (ref 0–0.5)
EOSINOPHIL NFR BLD: 3.5 % (ref 0–8)
ERYTHROCYTE [DISTWIDTH] IN BLOOD BY AUTOMATED COUNT: 13 % (ref 11.5–14.5)
EST. GFR  (NO RACE VARIABLE): >60 ML/MIN/1.73 M^2
GLUCOSE SERPL-MCNC: 94 MG/DL (ref 70–110)
HCT VFR BLD AUTO: 40.8 % (ref 37–48.5)
HGB BLD-MCNC: 13.4 G/DL (ref 12–16)
IMM GRANULOCYTES # BLD AUTO: 0.02 K/UL (ref 0–0.04)
IMM GRANULOCYTES NFR BLD AUTO: 0.3 % (ref 0–0.5)
INR PPP: 1 (ref 0.8–1.2)
LYMPHOCYTES # BLD AUTO: 3.2 K/UL (ref 1–4.8)
LYMPHOCYTES NFR BLD: 44.2 % (ref 18–48)
MCH RBC QN AUTO: 31.4 PG (ref 27–31)
MCHC RBC AUTO-ENTMCNC: 32.8 G/DL (ref 32–36)
MCV RBC AUTO: 96 FL (ref 82–98)
MONOCYTES # BLD AUTO: 0.7 K/UL (ref 0.3–1)
MONOCYTES NFR BLD: 9.5 % (ref 4–15)
NEUTROPHILS # BLD AUTO: 3 K/UL (ref 1.8–7.7)
NEUTROPHILS NFR BLD: 41.9 % (ref 38–73)
NRBC BLD-RTO: 0 /100 WBC
PLATELET # BLD AUTO: 234 K/UL (ref 150–450)
PMV BLD AUTO: 10.9 FL (ref 9.2–12.9)
POTASSIUM SERPL-SCNC: 4.2 MMOL/L (ref 3.5–5.1)
PROT SERPL-MCNC: 6.1 G/DL (ref 6–8.4)
PROTHROMBIN TIME: 10.5 SEC (ref 9–12.5)
RBC # BLD AUTO: 4.27 M/UL (ref 4–5.4)
SODIUM SERPL-SCNC: 136 MMOL/L (ref 136–145)
SPECIMEN OUTDATE: NORMAL
WBC # BLD AUTO: 7.18 K/UL (ref 3.9–12.7)

## 2024-11-27 PROCEDURE — 99900035 HC TECH TIME PER 15 MIN (STAT)

## 2024-11-27 PROCEDURE — 85610 PROTHROMBIN TIME: CPT

## 2024-11-27 PROCEDURE — 20600001 HC STEP DOWN PRIVATE ROOM

## 2024-11-27 PROCEDURE — 27201423 OPTIME MED/SURG SUP & DEVICES STERILE SUPPLY: Performed by: STUDENT IN AN ORGANIZED HEALTH CARE EDUCATION/TRAINING PROGRAM

## 2024-11-27 PROCEDURE — 27000221 HC OXYGEN, UP TO 24 HOURS

## 2024-11-27 PROCEDURE — 25000003 PHARM REV CODE 250: Performed by: STUDENT IN AN ORGANIZED HEALTH CARE EDUCATION/TRAINING PROGRAM

## 2024-11-27 PROCEDURE — 63600175 PHARM REV CODE 636 W HCPCS

## 2024-11-27 PROCEDURE — 36415 COLL VENOUS BLD VENIPUNCTURE: CPT | Performed by: PHYSICIAN ASSISTANT

## 2024-11-27 PROCEDURE — 25000003 PHARM REV CODE 250: Performed by: INTERNAL MEDICINE

## 2024-11-27 PROCEDURE — 94640 AIRWAY INHALATION TREATMENT: CPT

## 2024-11-27 PROCEDURE — 71000016 HC POSTOP RECOV ADDL HR: Performed by: STUDENT IN AN ORGANIZED HEALTH CARE EDUCATION/TRAINING PROGRAM

## 2024-11-27 PROCEDURE — 37000009 HC ANESTHESIA EA ADD 15 MINS: Performed by: STUDENT IN AN ORGANIZED HEALTH CARE EDUCATION/TRAINING PROGRAM

## 2024-11-27 PROCEDURE — 85025 COMPLETE CBC W/AUTO DIFF WBC: CPT

## 2024-11-27 PROCEDURE — 71000015 HC POSTOP RECOV 1ST HR: Performed by: STUDENT IN AN ORGANIZED HEALTH CARE EDUCATION/TRAINING PROGRAM

## 2024-11-27 PROCEDURE — 25000242 PHARM REV CODE 250 ALT 637 W/ HCPCS

## 2024-11-27 PROCEDURE — 80053 COMPREHEN METABOLIC PANEL: CPT

## 2024-11-27 PROCEDURE — 36000710: Performed by: STUDENT IN AN ORGANIZED HEALTH CARE EDUCATION/TRAINING PROGRAM

## 2024-11-27 PROCEDURE — 94761 N-INVAS EAR/PLS OXIMETRY MLT: CPT

## 2024-11-27 PROCEDURE — 86850 RBC ANTIBODY SCREEN: CPT | Performed by: PHYSICIAN ASSISTANT

## 2024-11-27 PROCEDURE — 25000003 PHARM REV CODE 250

## 2024-11-27 PROCEDURE — 36000711: Performed by: STUDENT IN AN ORGANIZED HEALTH CARE EDUCATION/TRAINING PROGRAM

## 2024-11-27 PROCEDURE — 36415 COLL VENOUS BLD VENIPUNCTURE: CPT

## 2024-11-27 PROCEDURE — 85730 THROMBOPLASTIN TIME PARTIAL: CPT

## 2024-11-27 PROCEDURE — 63600175 PHARM REV CODE 636 W HCPCS: Mod: JZ,JG | Performed by: STUDENT IN AN ORGANIZED HEALTH CARE EDUCATION/TRAINING PROGRAM

## 2024-11-27 PROCEDURE — 94799 UNLISTED PULMONARY SVC/PX: CPT

## 2024-11-27 PROCEDURE — 63600175 PHARM REV CODE 636 W HCPCS: Performed by: STUDENT IN AN ORGANIZED HEALTH CARE EDUCATION/TRAINING PROGRAM

## 2024-11-27 PROCEDURE — C1729 CATH, DRAINAGE: HCPCS | Performed by: STUDENT IN AN ORGANIZED HEALTH CARE EDUCATION/TRAINING PROGRAM

## 2024-11-27 PROCEDURE — 32650 THORACOSCOPY W/PLEURODESIS: CPT | Mod: LT,,, | Performed by: STUDENT IN AN ORGANIZED HEALTH CARE EDUCATION/TRAINING PROGRAM

## 2024-11-27 PROCEDURE — 37000008 HC ANESTHESIA 1ST 15 MINUTES: Performed by: STUDENT IN AN ORGANIZED HEALTH CARE EDUCATION/TRAINING PROGRAM

## 2024-11-27 PROCEDURE — 71000033 HC RECOVERY, INTIAL HOUR: Performed by: STUDENT IN AN ORGANIZED HEALTH CARE EDUCATION/TRAINING PROGRAM

## 2024-11-27 RX ORDER — AMOXICILLIN 250 MG
1 CAPSULE ORAL 2 TIMES DAILY
Status: DISCONTINUED | OUTPATIENT
Start: 2024-11-27 | End: 2024-11-30 | Stop reason: HOSPADM

## 2024-11-27 RX ORDER — DOXYCYCLINE 100 MG/10ML
INJECTION, POWDER, LYOPHILIZED, FOR SOLUTION INTRAVENOUS
Status: DISCONTINUED | OUTPATIENT
Start: 2024-11-27 | End: 2024-11-27 | Stop reason: HOSPADM

## 2024-11-27 RX ORDER — FENTANYL CITRATE 50 UG/ML
25 INJECTION, SOLUTION INTRAMUSCULAR; INTRAVENOUS EVERY 5 MIN PRN
Status: DISCONTINUED | OUTPATIENT
Start: 2024-11-27 | End: 2024-11-27 | Stop reason: HOSPADM

## 2024-11-27 RX ORDER — ENOXAPARIN SODIUM 100 MG/ML
40 INJECTION SUBCUTANEOUS EVERY 24 HOURS
Status: DISCONTINUED | OUTPATIENT
Start: 2024-11-28 | End: 2024-11-30 | Stop reason: HOSPADM

## 2024-11-27 RX ORDER — PHENYLEPHRINE HYDROCHLORIDE 10 MG/ML
INJECTION INTRAVENOUS
Status: DISCONTINUED | OUTPATIENT
Start: 2024-11-27 | End: 2024-11-27

## 2024-11-27 RX ORDER — PROPOFOL 10 MG/ML
VIAL (ML) INTRAVENOUS
Status: DISCONTINUED | OUTPATIENT
Start: 2024-11-27 | End: 2024-11-27

## 2024-11-27 RX ORDER — GLUCAGON 1 MG
1 KIT INJECTION
Status: DISCONTINUED | OUTPATIENT
Start: 2024-11-27 | End: 2024-11-27 | Stop reason: HOSPADM

## 2024-11-27 RX ORDER — FENTANYL CITRATE 50 UG/ML
INJECTION, SOLUTION INTRAMUSCULAR; INTRAVENOUS
Status: DISCONTINUED | OUTPATIENT
Start: 2024-11-27 | End: 2024-11-27

## 2024-11-27 RX ORDER — CEFAZOLIN 2 G/1
2 INJECTION, POWDER, FOR SOLUTION INTRAMUSCULAR; INTRAVENOUS
Status: COMPLETED | OUTPATIENT
Start: 2024-11-27 | End: 2024-11-28

## 2024-11-27 RX ORDER — LIDOCAINE HYDROCHLORIDE 20 MG/ML
INJECTION INTRAVENOUS
Status: DISCONTINUED | OUTPATIENT
Start: 2024-11-27 | End: 2024-11-27

## 2024-11-27 RX ORDER — MIDAZOLAM HYDROCHLORIDE 1 MG/ML
INJECTION INTRAMUSCULAR; INTRAVENOUS
Status: DISCONTINUED | OUTPATIENT
Start: 2024-11-27 | End: 2024-11-27

## 2024-11-27 RX ORDER — POLYETHYLENE GLYCOL 3350 17 G/17G
17 POWDER, FOR SOLUTION ORAL DAILY
Status: DISCONTINUED | OUTPATIENT
Start: 2024-11-27 | End: 2024-11-30 | Stop reason: HOSPADM

## 2024-11-27 RX ORDER — SODIUM CHLORIDE 0.9 % (FLUSH) 0.9 %
10 SYRINGE (ML) INJECTION EVERY 12 HOURS PRN
Status: DISCONTINUED | OUTPATIENT
Start: 2024-11-27 | End: 2024-11-30 | Stop reason: HOSPADM

## 2024-11-27 RX ORDER — ONDANSETRON 8 MG/1
8 TABLET, ORALLY DISINTEGRATING ORAL EVERY 8 HOURS PRN
Status: DISCONTINUED | OUTPATIENT
Start: 2024-11-27 | End: 2024-11-30 | Stop reason: HOSPADM

## 2024-11-27 RX ORDER — DEXMEDETOMIDINE HYDROCHLORIDE 100 UG/ML
INJECTION, SOLUTION INTRAVENOUS
Status: DISCONTINUED | OUTPATIENT
Start: 2024-11-27 | End: 2024-11-27

## 2024-11-27 RX ORDER — ONDANSETRON HYDROCHLORIDE 2 MG/ML
4 INJECTION, SOLUTION INTRAVENOUS DAILY PRN
Status: DISCONTINUED | OUTPATIENT
Start: 2024-11-27 | End: 2024-11-27 | Stop reason: HOSPADM

## 2024-11-27 RX ORDER — IBUPROFEN 200 MG
24 TABLET ORAL
Status: DISCONTINUED | OUTPATIENT
Start: 2024-11-27 | End: 2024-11-30 | Stop reason: HOSPADM

## 2024-11-27 RX ORDER — HALOPERIDOL 5 MG/ML
0.5 INJECTION INTRAMUSCULAR EVERY 10 MIN PRN
Status: DISCONTINUED | OUTPATIENT
Start: 2024-11-27 | End: 2024-11-27 | Stop reason: HOSPADM

## 2024-11-27 RX ORDER — HYDROMORPHONE HYDROCHLORIDE 1 MG/ML
0.2 INJECTION, SOLUTION INTRAMUSCULAR; INTRAVENOUS; SUBCUTANEOUS EVERY 5 MIN PRN
Status: DISCONTINUED | OUTPATIENT
Start: 2024-11-27 | End: 2024-11-27 | Stop reason: HOSPADM

## 2024-11-27 RX ORDER — BUPIVACAINE HYDROCHLORIDE 2.5 MG/ML
INJECTION, SOLUTION EPIDURAL; INFILTRATION; INTRACAUDAL
Status: DISCONTINUED | OUTPATIENT
Start: 2024-11-27 | End: 2024-11-27 | Stop reason: HOSPADM

## 2024-11-27 RX ORDER — PROCHLORPERAZINE EDISYLATE 5 MG/ML
5 INJECTION INTRAMUSCULAR; INTRAVENOUS EVERY 30 MIN PRN
Status: DISCONTINUED | OUTPATIENT
Start: 2024-11-27 | End: 2024-11-27 | Stop reason: HOSPADM

## 2024-11-27 RX ORDER — CEFAZOLIN SODIUM 1 G/3ML
INJECTION, POWDER, FOR SOLUTION INTRAMUSCULAR; INTRAVENOUS
Status: DISCONTINUED | OUTPATIENT
Start: 2024-11-27 | End: 2024-11-27

## 2024-11-27 RX ORDER — ACETAMINOPHEN 325 MG/1
650 TABLET ORAL EVERY 8 HOURS
Status: DISCONTINUED | OUTPATIENT
Start: 2024-11-27 | End: 2024-11-27 | Stop reason: SDUPTHER

## 2024-11-27 RX ORDER — DEXAMETHASONE SODIUM PHOSPHATE 4 MG/ML
INJECTION, SOLUTION INTRA-ARTICULAR; INTRALESIONAL; INTRAMUSCULAR; INTRAVENOUS; SOFT TISSUE
Status: DISCONTINUED | OUTPATIENT
Start: 2024-11-27 | End: 2024-11-27

## 2024-11-27 RX ORDER — OXYCODONE HYDROCHLORIDE 10 MG/1
10 TABLET ORAL EVERY 4 HOURS PRN
Status: DISCONTINUED | OUTPATIENT
Start: 2024-11-27 | End: 2024-11-30 | Stop reason: HOSPADM

## 2024-11-27 RX ORDER — HYDROMORPHONE HYDROCHLORIDE 1 MG/ML
0.2 INJECTION, SOLUTION INTRAMUSCULAR; INTRAVENOUS; SUBCUTANEOUS EVERY 5 MIN PRN
Status: COMPLETED | OUTPATIENT
Start: 2024-11-27 | End: 2024-11-27

## 2024-11-27 RX ORDER — HYDROMORPHONE HYDROCHLORIDE 1 MG/ML
INJECTION, SOLUTION INTRAMUSCULAR; INTRAVENOUS; SUBCUTANEOUS
Status: DISPENSED
Start: 2024-11-27 | End: 2024-11-28

## 2024-11-27 RX ORDER — GLUCAGON 1 MG
1 KIT INJECTION
Status: DISCONTINUED | OUTPATIENT
Start: 2024-11-27 | End: 2024-11-30 | Stop reason: HOSPADM

## 2024-11-27 RX ORDER — IPRATROPIUM BROMIDE AND ALBUTEROL SULFATE 2.5; .5 MG/3ML; MG/3ML
3 SOLUTION RESPIRATORY (INHALATION)
Status: DISCONTINUED | OUTPATIENT
Start: 2024-11-27 | End: 2024-11-30 | Stop reason: HOSPADM

## 2024-11-27 RX ORDER — ONDANSETRON HYDROCHLORIDE 2 MG/ML
INJECTION, SOLUTION INTRAVENOUS
Status: DISCONTINUED | OUTPATIENT
Start: 2024-11-27 | End: 2024-11-27

## 2024-11-27 RX ORDER — SODIUM CHLORIDE 9 MG/ML
INJECTION, SOLUTION INTRAVENOUS
Status: COMPLETED | OUTPATIENT
Start: 2024-11-27 | End: 2024-11-27

## 2024-11-27 RX ORDER — OXYCODONE HYDROCHLORIDE 5 MG/1
5 TABLET ORAL
Status: DISCONTINUED | OUTPATIENT
Start: 2024-11-27 | End: 2024-11-27 | Stop reason: HOSPADM

## 2024-11-27 RX ORDER — GABAPENTIN 300 MG/1
300 CAPSULE ORAL NIGHTLY
Status: DISCONTINUED | OUTPATIENT
Start: 2024-11-27 | End: 2024-11-30 | Stop reason: HOSPADM

## 2024-11-27 RX ORDER — HYDROMORPHONE HYDROCHLORIDE 1 MG/ML
0.1 INJECTION, SOLUTION INTRAMUSCULAR; INTRAVENOUS; SUBCUTANEOUS EVERY 5 MIN PRN
Status: DISCONTINUED | OUTPATIENT
Start: 2024-11-27 | End: 2024-11-27 | Stop reason: HOSPADM

## 2024-11-27 RX ORDER — NALOXONE HCL 0.4 MG/ML
0.02 VIAL (ML) INJECTION
Status: DISCONTINUED | OUTPATIENT
Start: 2024-11-27 | End: 2024-11-30 | Stop reason: HOSPADM

## 2024-11-27 RX ORDER — OXYCODONE HYDROCHLORIDE 5 MG/1
5 TABLET ORAL EVERY 4 HOURS PRN
Status: DISCONTINUED | OUTPATIENT
Start: 2024-11-27 | End: 2024-11-30 | Stop reason: HOSPADM

## 2024-11-27 RX ORDER — IBUPROFEN 200 MG
16 TABLET ORAL
Status: DISCONTINUED | OUTPATIENT
Start: 2024-11-27 | End: 2024-11-30 | Stop reason: HOSPADM

## 2024-11-27 RX ORDER — HYDROMORPHONE HYDROCHLORIDE 1 MG/ML
0.5 INJECTION, SOLUTION INTRAMUSCULAR; INTRAVENOUS; SUBCUTANEOUS ONCE
Status: COMPLETED | OUTPATIENT
Start: 2024-11-27 | End: 2024-11-27

## 2024-11-27 RX ORDER — ROCURONIUM BROMIDE 10 MG/ML
INJECTION, SOLUTION INTRAVENOUS
Status: DISCONTINUED | OUTPATIENT
Start: 2024-11-27 | End: 2024-11-27

## 2024-11-27 RX ORDER — BISACODYL 10 MG/1
10 SUPPOSITORY RECTAL DAILY PRN
Status: DISCONTINUED | OUTPATIENT
Start: 2024-11-27 | End: 2024-11-30 | Stop reason: HOSPADM

## 2024-11-27 RX ADMIN — HYDROMORPHONE HYDROCHLORIDE 0.2 MG: 1 INJECTION, SOLUTION INTRAMUSCULAR; INTRAVENOUS; SUBCUTANEOUS at 02:11

## 2024-11-27 RX ADMIN — OXYCODONE 5 MG: 5 TABLET ORAL at 12:11

## 2024-11-27 RX ADMIN — PROPOFOL 30 MG: 10 INJECTION, EMULSION INTRAVENOUS at 01:11

## 2024-11-27 RX ADMIN — PROPOFOL 120 MG: 10 INJECTION, EMULSION INTRAVENOUS at 12:11

## 2024-11-27 RX ADMIN — FENTANYL CITRATE 100 MCG: 50 INJECTION, SOLUTION INTRAMUSCULAR; INTRAVENOUS at 12:11

## 2024-11-27 RX ADMIN — ONDANSETRON 4 MG: 2 INJECTION INTRAMUSCULAR; INTRAVENOUS at 01:11

## 2024-11-27 RX ADMIN — DEXMEDETOMIDINE 8 MCG: 100 INJECTION, SOLUTION, CONCENTRATE INTRAVENOUS at 01:11

## 2024-11-27 RX ADMIN — IPRATROPIUM BROMIDE AND ALBUTEROL SULFATE 3 ML: 2.5; .5 SOLUTION RESPIRATORY (INHALATION) at 08:11

## 2024-11-27 RX ADMIN — GABAPENTIN 300 MG: 300 CAPSULE ORAL at 08:11

## 2024-11-27 RX ADMIN — HALOPERIDOL LACTATE 0.5 MG: 5 INJECTION, SOLUTION INTRAMUSCULAR at 03:11

## 2024-11-27 RX ADMIN — CEFAZOLIN 2 G: 2 INJECTION, POWDER, FOR SOLUTION INTRAMUSCULAR; INTRAVENOUS at 08:11

## 2024-11-27 RX ADMIN — DEXAMETHASONE SODIUM PHOSPHATE 8 MG: 4 INJECTION, SOLUTION INTRAMUSCULAR; INTRAVENOUS at 12:11

## 2024-11-27 RX ADMIN — MIDAZOLAM HYDROCHLORIDE 2 MG: 1 INJECTION, SOLUTION INTRAMUSCULAR; INTRAVENOUS at 12:11

## 2024-11-27 RX ADMIN — OXYCODONE 5 MG: 5 TABLET ORAL at 08:11

## 2024-11-27 RX ADMIN — LIDOCAINE HYDROCHLORIDE 80 MG: 20 INJECTION INTRAVENOUS at 12:11

## 2024-11-27 RX ADMIN — CEFAZOLIN 2 G: 330 INJECTION, POWDER, FOR SOLUTION INTRAMUSCULAR; INTRAVENOUS at 12:11

## 2024-11-27 RX ADMIN — HYDROMORPHONE HYDROCHLORIDE 0.5 MG: 1 INJECTION, SOLUTION INTRAMUSCULAR; INTRAVENOUS; SUBCUTANEOUS at 08:11

## 2024-11-27 RX ADMIN — PHENYLEPHRINE HYDROCHLORIDE 100 MCG: 10 INJECTION INTRAVENOUS at 12:11

## 2024-11-27 RX ADMIN — ROCURONIUM BROMIDE 50 MG: 10 INJECTION, SOLUTION INTRAVENOUS at 12:11

## 2024-11-27 RX ADMIN — PHENYLEPHRINE HYDROCHLORIDE 200 MCG: 10 INJECTION INTRAVENOUS at 12:11

## 2024-11-27 RX ADMIN — SODIUM CHLORIDE, SODIUM LACTATE, POTASSIUM CHLORIDE, AND CALCIUM CHLORIDE: .6; .31; .03; .02 INJECTION, SOLUTION INTRAVENOUS at 12:11

## 2024-11-27 RX ADMIN — OXYCODONE HYDROCHLORIDE 10 MG: 10 TABLET ORAL at 02:11

## 2024-11-27 RX ADMIN — SENNOSIDES AND DOCUSATE SODIUM 1 TABLET: 50; 8.6 TABLET ORAL at 08:11

## 2024-11-27 NOTE — PROGRESS NOTES
Tommy Day - [facility]  Thoracic Surgery  Progress Note    Subjective:     History of Present Illness:  No notes on file    Post-Op Info:  Procedure(s) (LRB):  VATS, WITH PLEURODESIS mechanical and chemical pleruodesis (Left)         Interval History:  No acute events overnight.  Chest tube remains in place intermittent air leak.  To OR today    Medications:  Continuous Infusions:  Scheduled Meds:   HYDROmorphone  0.5 mg Intravenous Once     PRN Meds:  Current Facility-Administered Medications:     acetaminophen, 650 mg, Oral, Q6H PRN    albuterol-ipratropium, 3 mL, Nebulization, Q6H PRN    dextrose 10%, 12.5 g, Intravenous, PRN    dextrose 10%, 25 g, Intravenous, PRN    glucagon (human recombinant), 1 mg, Intramuscular, PRN    glucose, 16 g, Oral, PRN    glucose, 24 g, Oral, PRN    naloxone, 0.02 mg, Intravenous, PRN    oxyCODONE, 5 mg, Oral, Q6H PRN    sodium chloride 0.9%, 10 mL, Intravenous, Q12H PRN     Review of patient's allergies indicates:  No Known Allergies  Objective:     Vital Signs (Most Recent):  Temp: 97.8 °F (36.6 °C) (11/27/24 0717)  Pulse: (!) 57 (11/27/24 0717)  Resp: 18 (11/27/24 0717)  BP: 101/64 (11/27/24 0717)  SpO2: (!) 94 % (11/27/24 0717) Vital Signs (24h Range):  Temp:  [97.8 °F (36.6 °C)-98.8 °F (37.1 °C)] 97.8 °F (36.6 °C)  Pulse:  [57-75] 57  Resp:  [10-18] 18  SpO2:  [91 %-96 %] 94 %  BP: (101-131)/(62-77) 101/64     Intake/Output - Last 3 Shifts         11/25 0700 11/26 0659 11/26 0700 11/27 0659 11/27 0700 11/28 0659           Urine Occurrence  4 x     Stool Occurrence  0 x             SpO2: (!) 94 %        Physical Exam  Constitutional:       General: She is not in acute distress.     Appearance: Normal appearance. She is not diaphoretic.   HENT:      Head: Normocephalic and atraumatic.   Eyes:      Pupils: Pupils are equal, round, and reactive to light.   Cardiovascular:      Rate and Rhythm: Normal rate and regular rhythm.   Pulmonary:      Effort: Pulmonary effort is normal. No  respiratory distress.      Comments: Normal work of breathing.  Left-sided chest tube in place with intermittent air leak.  To suction  Abdominal:      General: There is no distension.      Palpations: Abdomen is soft.   Skin:     General: Skin is warm and dry.   Neurological:      General: No focal deficit present.      Mental Status: She is alert and oriented to person, place, and time.   Psychiatric:         Mood and Affect: Mood normal.         Behavior: Behavior normal.         Thought Content: Thought content normal.         Judgment: Judgment normal.            Significant Labs:  CBC:   Recent Labs   Lab 11/27/24 0548   WBC 7.18   RBC 4.27   HGB 13.4   HCT 40.8      MCV 96   MCH 31.4*   MCHC 32.8     CMP:   Recent Labs   Lab 11/27/24 0548   GLU 94   CALCIUM 9.1   ALBUMIN 3.5   PROT 6.1      K 4.2   CO2 24      BUN 13   CREATININE 0.7   ALKPHOS 81   ALT 10   AST 12   BILITOT 0.5       Significant Diagnostics:  I have reviewed all pertinent imaging results/findings within the past 24 hours.    VTE Risk Mitigation (From admission, onward)           Ordered     IP VTE HIGH RISK PATIENT  Once         11/27/24 0252     Place sequential compression device  Until discontinued         11/27/24 0252                  Assessment/Plan:     * Pneumothorax  Pt is a 63 yo female with recurrent spontaneous pneumothorax.      - Will plan for OR today for VATS with pleurodesis  - Risks and benefits of continued conservative management versus VATS with pleurodesis discussed with patient. Informed consent obtained.   - NPO for procedure  - Please call or page with any questions        Baldev Tom MD  Thoracic Surgery  [facility]

## 2024-11-27 NOTE — NURSING
Patient AAO x 3, c/o sharp pains to left chest tube site, primarily with movement.  In no acute distress. Will contact team for orders.

## 2024-11-27 NOTE — ASSESSMENT & PLAN NOTE
Pt is a 65 yo female with recurrent spontaneous pneumothorax.      - Will plan for OR today for VATS with pleurodesis  - Risks and benefits of continued conservative management versus VATS with pleurodesis discussed with patient. Informed consent obtained.   - NPO for procedure  - Please call or page with any questions

## 2024-11-27 NOTE — SUBJECTIVE & OBJECTIVE
Interval History:  No acute events overnight.  Chest tube remains in place intermittent air leak.  To OR today    Medications:  Continuous Infusions:  Scheduled Meds:   HYDROmorphone  0.5 mg Intravenous Once     PRN Meds:  Current Facility-Administered Medications:     acetaminophen, 650 mg, Oral, Q6H PRN    albuterol-ipratropium, 3 mL, Nebulization, Q6H PRN    dextrose 10%, 12.5 g, Intravenous, PRN    dextrose 10%, 25 g, Intravenous, PRN    glucagon (human recombinant), 1 mg, Intramuscular, PRN    glucose, 16 g, Oral, PRN    glucose, 24 g, Oral, PRN    naloxone, 0.02 mg, Intravenous, PRN    oxyCODONE, 5 mg, Oral, Q6H PRN    sodium chloride 0.9%, 10 mL, Intravenous, Q12H PRN     Review of patient's allergies indicates:  No Known Allergies  Objective:     Vital Signs (Most Recent):  Temp: 97.8 °F (36.6 °C) (11/27/24 0717)  Pulse: (!) 57 (11/27/24 0717)  Resp: 18 (11/27/24 0717)  BP: 101/64 (11/27/24 0717)  SpO2: (!) 94 % (11/27/24 0717) Vital Signs (24h Range):  Temp:  [97.8 °F (36.6 °C)-98.8 °F (37.1 °C)] 97.8 °F (36.6 °C)  Pulse:  [57-75] 57  Resp:  [10-18] 18  SpO2:  [91 %-96 %] 94 %  BP: (101-131)/(62-77) 101/64     Intake/Output - Last 3 Shifts         11/25 0700 11/26 0659 11/26 0700 11/27 0659 11/27 0700 11/28 0659           Urine Occurrence  4 x     Stool Occurrence  0 x             SpO2: (!) 94 %        Physical Exam  Constitutional:       General: She is not in acute distress.     Appearance: Normal appearance. She is not diaphoretic.   HENT:      Head: Normocephalic and atraumatic.   Eyes:      Pupils: Pupils are equal, round, and reactive to light.   Cardiovascular:      Rate and Rhythm: Normal rate and regular rhythm.   Pulmonary:      Effort: Pulmonary effort is normal. No respiratory distress.      Comments: Normal work of breathing.  Left-sided chest tube in place with intermittent air leak.  To suction  Abdominal:      General: There is no distension.      Palpations: Abdomen is soft.   Skin:      General: Skin is warm and dry.   Neurological:      General: No focal deficit present.      Mental Status: She is alert and oriented to person, place, and time.   Psychiatric:         Mood and Affect: Mood normal.         Behavior: Behavior normal.         Thought Content: Thought content normal.         Judgment: Judgment normal.            Significant Labs:  CBC:   Recent Labs   Lab 11/27/24 0548   WBC 7.18   RBC 4.27   HGB 13.4   HCT 40.8      MCV 96   MCH 31.4*   MCHC 32.8     CMP:   Recent Labs   Lab 11/27/24 0548   GLU 94   CALCIUM 9.1   ALBUMIN 3.5   PROT 6.1      K 4.2   CO2 24      BUN 13   CREATININE 0.7   ALKPHOS 81   ALT 10   AST 12   BILITOT 0.5       Significant Diagnostics:  I have reviewed all pertinent imaging results/findings within the past 24 hours.    VTE Risk Mitigation (From admission, onward)           Ordered     IP VTE HIGH RISK PATIENT  Once         11/27/24 0252     Place sequential compression device  Until discontinued         11/27/24 0252

## 2024-11-27 NOTE — PROGRESS NOTES
"[facility] Plan of Care Note    Dx:   Pneumothorax [J93.9]    Shift Events: MD notified of air leak present without cough or movement. Patient asymptomatic and vitals stable, no new orders received.    Goals of Care: Pain control, chest tube management    Neuro: AAOx4    Vital Signs: /66 (BP Location: Right arm, Patient Position: Lying)   Pulse 65   Temp 97.9 °F (36.6 °C) (Oral)   Resp 18   Ht 5' 3" (1.6 m)   Wt 51.7 kg (113 lb 15.7 oz)   SpO2 (!) 94%   BMI 20.19 kg/m²     Respiratory: NC 2L    Diet: Diet NPO    Is patient tolerating current diet? yes    GTTS: none    Urine Output/Bowel Movement:   No intake/output data recorded.  Last Bowel Movement: 11/24/24      Drains/Tubes/Tube Feeds (include total output/shift):   No intake/output data recorded.    Lines: PVI RAC #20; LFA #20    Accuchecks: no    Skin: surgical incision    Fall Risk Score: 2    Activity level? Stand by    Any scheduled procedures? VATS with Pleurodesis, Mechanical and Chemical Pleurodesis    Any safety concerns? Bleeding    "

## 2024-11-27 NOTE — PROGRESS NOTES
[facility] ([address] [place] [address] Medicine  Progress Note    Patient Name: Christin Luna  MRN: [id number]  Patient Class: IP- Inpatient   Admission Date: 11/26/2024  Length of Stay: 1 days  Attending Physician: Andra Khan MD  Primary Care Provider: Arthur Bauman MD        Subjective:     Principal Problem:Pneumothorax        HPI:      Overview/Hospital Course:  11/27 Underwent pleurodesis today.    Interval History: Underwent pleurodesis today. Feeling well this am.    Review of Systems  Objective:     Vital Signs (Most Recent):  Temp: 98.4 °F (36.9 °C) (11/27/24 0939)  Pulse: 66 (11/27/24 1045)  Resp: (!) 29 (11/27/24 1045)  BP: 123/81 (11/27/24 0939)  SpO2: 97 % (11/27/24 1045) Vital Signs (24h Range):  Temp:  [97.8 °F (36.6 °C)-98.8 °F (37.1 °C)] 98.4 °F (36.9 °C)  Pulse:  [57-75] 66  Resp:  [14-34] 29  SpO2:  [91 %-97 %] 97 %  BP: (101-123)/(62-81) 123/81     Weight: 51.7 kg (113 lb 15.7 oz)  Body mass index is 20.19 kg/m².    Intake/Output Summary (Last 24 hours) at 11/27/2024 1422  Last data filed at 11/27/2024 1338  Gross per 24 hour   Intake 400 ml   Output --   Net 400 ml         Physical Exam  Constitutional:       Appearance: Normal appearance.   HENT:      Head: Normocephalic.      Mouth/Throat:      Mouth: Mucous membranes are moist.   Pulmonary:      Breath sounds: Wheezing present.   Musculoskeletal:         General: Normal range of motion.      Cervical back: Normal range of motion.   Skin:     General: Skin is warm.   Neurological:      General: No focal deficit present.      Mental Status: She is alert.             Significant Labs: All pertinent labs within the past 24 hours have been reviewed.  CBC:   Recent Labs   Lab 11/26/24  0305 11/27/24  0548   WBC 7.55 7.18   HGB 12.8 13.4   HCT 38.4 40.8    234       Significant Imaging: I have reviewed all pertinent imaging results/findings within the past 24 hours.    Assessment/Plan:      * Pneumothorax  Pt is a 63 yo female with  recurrent spontaneous pneumothorax.      OR today for VATS with pleurodesis  - Risks and benefits of continued conservative management versus VATS with pleurodesis discussed with patient. Informed consent obtained.   - NPO for procedure  Incentive spirometry after        Centrilobular emphysema  Patient's COPD is controlled currently.  Patient is currently off COPD Pathway. Continue scheduled inhalers Steroids, Antibiotics, and Supplemental oxygen and monitor respiratory status closely.       VTE Risk Mitigation (From admission, onward)           Ordered     enoxaparin injection 40 mg  Daily         11/27/24 1413     IP VTE HIGH RISK PATIENT  Once         11/27/24 1413     Place WEI hose  Until discontinued         11/27/24 1413     Place sequential compression device  Until discontinued         11/27/24 1413     Place sequential compression device  Until discontinued         11/27/24 0252                    Discharge Planning   JOSE ELIAS: 11/27/2024     Code Status: Full Code   Is the patient medically ready for discharge?:     Reason for patient still in hospital (select all that apply): Patient unstable                     Bao Gaviria MD  Department of Hospital Medicine   [facility] - Surgery ([facility])

## 2024-11-27 NOTE — NURSING TRANSFER
Nursing Transfer Note      11/27/2024   4:27 PM    Nurse giving handoff:dylon rn  Nurse receiving handoff:eric cardenas    Reason patient is being transferred: post procedure    Transfer To: 1052    Transfer via bed    Transfer with  to O2, cardiac monitoring    Transported by transport and rn    Telemetry: Box Number [id number], Rate 66 , Rhythm NSR, and Telemetry  adarsh  Order for Tele Monitor? Yes    Any special needs or follow-up needed: air leak present    Patient belongings transferred with patient: No    Chart send with patient: Yes    Notified: spouse    Patient reassessed at: 11/27/24 @

## 2024-11-27 NOTE — ASSESSMENT & PLAN NOTE
Pt is a 63 yo female with recurrent spontaneous pneumothorax.      OR today for VATS with pleurodesis  - Risks and benefits of continued conservative management versus VATS with pleurodesis discussed with patient. Informed consent obtained.   - NPO for procedure  Incentive spirometry after

## 2024-11-27 NOTE — NURSING TRANSFER
Nursing Transfer Note      11/27/2024   10:21 AM    Patient AAO x4, in no acute distress, leaving [facility] for surgical procedure, will return to same room after procedure, spouse and daughters maintain patients valuables, procedure explained to patient who verbalized understanding.     Reason patient is being transferred: surgery    Transfer To: surgery    Transfer via bed    Transfer with O2 at 2L/NC, portable telemetry monitor    Transported by surgery staff    TTelemetry: Box Number [id number]  Order for Tele Monitor? Yes    Additional Lines: Chest Tube    Medicines sent: none    Any special needs or follow-up needed: n/a    Patient belongings transferred with patient: No    Chart send with patient: No    Notified: spouse, daughter

## 2024-11-27 NOTE — SUBJECTIVE & OBJECTIVE
Interval History: Underwent pleurodesis today. Feeling well this am.    Review of Systems  Objective:     Vital Signs (Most Recent):  Temp: 98.4 °F (36.9 °C) (11/27/24 0939)  Pulse: 66 (11/27/24 1045)  Resp: (!) 29 (11/27/24 1045)  BP: 123/81 (11/27/24 0939)  SpO2: 97 % (11/27/24 1045) Vital Signs (24h Range):  Temp:  [97.8 °F (36.6 °C)-98.8 °F (37.1 °C)] 98.4 °F (36.9 °C)  Pulse:  [57-75] 66  Resp:  [14-34] 29  SpO2:  [91 %-97 %] 97 %  BP: (101-123)/(62-81) 123/81     Weight: 51.7 kg (113 lb 15.7 oz)  Body mass index is 20.19 kg/m².    Intake/Output Summary (Last 24 hours) at 11/27/2024 1422  Last data filed at 11/27/2024 1338  Gross per 24 hour   Intake 400 ml   Output --   Net 400 ml         Physical Exam  Constitutional:       Appearance: Normal appearance.   HENT:      Head: Normocephalic.      Mouth/Throat:      Mouth: Mucous membranes are moist.   Pulmonary:      Breath sounds: Wheezing present.   Musculoskeletal:         General: Normal range of motion.      Cervical back: Normal range of motion.   Skin:     General: Skin is warm.   Neurological:      General: No focal deficit present.      Mental Status: She is alert.             Significant Labs: All pertinent labs within the past 24 hours have been reviewed.  CBC:   Recent Labs   Lab 11/26/24  0305 11/27/24  0548   WBC 7.55 7.18   HGB 12.8 13.4   HCT 38.4 40.8    234       Significant Imaging: I have reviewed all pertinent imaging results/findings within the past 24 hours.

## 2024-11-27 NOTE — PLAN OF CARE
Patient transported to unit via bed. Patient on 2L of O2 via NC. Chest tube to  -20mmHg.  Patient appears to be in no immediate distress. Patient prepared for surgery. Perioperative period discussed and all questions addressed. Spouse and daughters at the bedside.

## 2024-11-27 NOTE — BRIEF OP NOTE
[facility] (2nd Fl)  Brief Operative Note    SUMMARY     Surgery Date: 11/27/2024     Surgeons and Role:     * Farshad Valdovinos MD - Primary     * Baldev Tom MD - Resident - Assisting        Pre-op Diagnosis:  Pneumothorax [J93.9]    Post-op Diagnosis:  Post-Op Diagnosis Codes:     * Pneumothorax [J93.9]    Procedure(s) (LRB):  VATS, WITH PLEURODESIS mechanical and chemical pleruodesis (Left)    Anesthesia: General      Operative Findings: L VATS with mechanical/chemical pleurodesis performed.     Estimated Blood Loss: 10 cc            Specimens:   Specimen (24h ago, onward)      None            [id number]

## 2024-11-27 NOTE — TRANSFER OF CARE
"Anesthesia Transfer of Care Note    Patient: Christin Luna    Procedure(s) Performed: Procedure(s) (LRB):  VATS, WITH PLEURODESIS mechanical and chemical pleruodesis (Left)    Patient location: PACU    Anesthesia Type: general    Transport from OR: Transported from OR on 6-10 L/min O2 by face mask with adequate spontaneous ventilation    Post pain: adequate analgesia    Post assessment: no apparent anesthetic complications    Post vital signs: stable    Level of consciousness: responds to stimulation and awake    Nausea/Vomiting: no nausea/vomiting    Complications: none    Transfer of care protocol was followedComments: Patient handoff given to RN. Vitals stable. Oxygenating well with face mask.       Last vitals: Visit Vitals  /81 (BP Location: Right arm, Patient Position: Lying)   Pulse 66   Temp 36.9 °C (98.4 °F)   Resp (!) 29   Ht 5' 3" (1.6 m)   Wt 51.7 kg (113 lb 15.7 oz)   LMP  (LMP Unknown)   SpO2 97%   Breastfeeding No   BMI 20.19 kg/m²     "

## 2024-11-27 NOTE — OP NOTE
Thoracic Surgery Operative Report      Date:11/27/24     Preoperative diagnosis:  recurrent spontaneous pneumothorax     Postoperative diagnosis:  recurrent spontaneous pneumothorax     Procedure performed:   Left video-assisted thoracoscopy (VATS),   Mechanical and Chemical Pleurodesis  Intercostal nerve blocks.      Intraoperative findings: Multiple apical blebs.      Surgeon:Farshad Valdovinos M.D    Resident: Baldev Tom M.D.     Anesthesia type: General Anesthesia      Estimated blood loss: 10 cc    Complications: None.      Drains: 28 Russian chest tube     Disposition: The patient was taken to recovery room in stable condition. The patient tolerated the procedure well.      Indication for the procedure:   Mrs. Luna is a 64 year/old female who presents with recurrent pneumothorax. A CT of the chest was performed and it showed diffused emphysematous change. The patient has been recommended a pleurodesis. The risks, benefits, and alternatives were discussed with the patient. We also discussed the possibility of recurrence on the left side as well as the association with spotaneous pneumothorax of the contralateral side who consented to surgery.     Description of the operation:    The patient was brought to the operating room and placed supine on the operating room table. Intravenous lines were placed by the anesthesia team. 2 grams of IV Ancef were given for antibiotic prophylaxis. Compression boots were placed on lower extremities for DVT prophylaxis. The patient was then intubated with a double lumen endotracheal tube by the anesthesia team.   The patient was then positioned in the right lateral decubitus position with the left chest facing upward. The left chest was prepped with chlorhexidine. Sterile drapes were then placed on the patient. After a time out for universal protocol. A 12 mm incision was made over the 7th intercostal space in the mid-axillary line and access to the chest was obtained  uneventfully. A thoracoport was placed through the incision and the 10 mm 30° thoracoscope was introduced. The pleural cavity was inspected. It showed diffuse emphysematous change.     Another thoracoscopic 15mm incision made in the anterior 7th intercostal space. Next, an intercostal nerve blocks was performed using 0.25% Marcain, injected interspaces 4-10 from the outside with a 25 gauge needle. Next a full mechanical pleurodesis was performed by rubbing a bovie scratchpad against the parietal pleura. After hemostasis was ensured, the chest cavity was sprayed with 500mg of Doxycyclin in a 100mL solution. A single 24 Tajik chest tubes was inserted into the chest through the anterior/inferior port and directed apically. The lung was ventilated and inflated slowly under direct vision.  The chest tube was secured with a #0-Ethibond suture and connected to pleurovac at -10mmHg and elevated for 2 hours. The thoracoscopic incisions were closed in 3 layers. The muscle layers were reapproximated with a running #0 Vicryl suture. The adipose tissue layers were reapproximated a running 2-0 Vicryl suture. The skin was reapproximated with 4-0 Monocryl. The sponge and instrument counts were correct x2.

## 2024-11-27 NOTE — PROGRESS NOTES
Negro BRIZUELA aware of air leak in chest tube. Pt on floor with chest tube to suction. No orders given at this time.

## 2024-11-28 PROBLEM — R07.1 CHEST PAIN ON BREATHING: Status: ACTIVE | Noted: 2024-11-28

## 2024-11-28 LAB
ALBUMIN SERPL BCP-MCNC: 3.4 G/DL (ref 3.5–5.2)
ALP SERPL-CCNC: 79 U/L (ref 40–150)
ALT SERPL W/O P-5'-P-CCNC: 9 U/L (ref 10–44)
ANION GAP SERPL CALC-SCNC: 10 MMOL/L (ref 8–16)
ANION GAP SERPL CALC-SCNC: 10 MMOL/L (ref 8–16)
ANION GAP SERPL CALC-SCNC: 11 MMOL/L (ref 8–16)
AST SERPL-CCNC: 21 U/L (ref 10–40)
BASOPHILS # BLD AUTO: 0.02 K/UL (ref 0–0.2)
BASOPHILS NFR BLD: 0.2 % (ref 0–1.9)
BILIRUB SERPL-MCNC: 0.4 MG/DL (ref 0.1–1)
BUN SERPL-MCNC: 16 MG/DL (ref 8–23)
BUN SERPL-MCNC: 17 MG/DL (ref 8–23)
BUN SERPL-MCNC: 17 MG/DL (ref 8–23)
CALCIUM SERPL-MCNC: 9.7 MG/DL (ref 8.7–10.5)
CALCIUM SERPL-MCNC: 9.8 MG/DL (ref 8.7–10.5)
CALCIUM SERPL-MCNC: 9.8 MG/DL (ref 8.7–10.5)
CHLORIDE SERPL-SCNC: 104 MMOL/L (ref 95–110)
CHLORIDE SERPL-SCNC: 104 MMOL/L (ref 95–110)
CHLORIDE SERPL-SCNC: 105 MMOL/L (ref 95–110)
CO2 SERPL-SCNC: 19 MMOL/L (ref 23–29)
CO2 SERPL-SCNC: 20 MMOL/L (ref 23–29)
CO2 SERPL-SCNC: 20 MMOL/L (ref 23–29)
CREAT SERPL-MCNC: 0.7 MG/DL (ref 0.5–1.4)
DIFFERENTIAL METHOD BLD: ABNORMAL
EOSINOPHIL # BLD AUTO: 0 K/UL (ref 0–0.5)
EOSINOPHIL NFR BLD: 0.1 % (ref 0–8)
ERYTHROCYTE [DISTWIDTH] IN BLOOD BY AUTOMATED COUNT: 12.8 % (ref 11.5–14.5)
EST. GFR  (NO RACE VARIABLE): >60 ML/MIN/1.73 M^2
GLUCOSE SERPL-MCNC: 92 MG/DL (ref 70–110)
GLUCOSE SERPL-MCNC: 93 MG/DL (ref 70–110)
GLUCOSE SERPL-MCNC: 93 MG/DL (ref 70–110)
HCT VFR BLD AUTO: 39 % (ref 37–48.5)
HGB BLD-MCNC: 13.2 G/DL (ref 12–16)
IMM GRANULOCYTES # BLD AUTO: 0.03 K/UL (ref 0–0.04)
IMM GRANULOCYTES NFR BLD AUTO: 0.4 % (ref 0–0.5)
LYMPHOCYTES # BLD AUTO: 1.1 K/UL (ref 1–4.8)
LYMPHOCYTES NFR BLD: 13 % (ref 18–48)
MCH RBC QN AUTO: 31.7 PG (ref 27–31)
MCHC RBC AUTO-ENTMCNC: 33.8 G/DL (ref 32–36)
MCV RBC AUTO: 94 FL (ref 82–98)
MONOCYTES # BLD AUTO: 0.7 K/UL (ref 0.3–1)
MONOCYTES NFR BLD: 8.8 % (ref 4–15)
NEUTROPHILS # BLD AUTO: 6.4 K/UL (ref 1.8–7.7)
NEUTROPHILS NFR BLD: 77.5 % (ref 38–73)
NRBC BLD-RTO: 0 /100 WBC
PLATELET # BLD AUTO: 242 K/UL (ref 150–450)
PMV BLD AUTO: 11.2 FL (ref 9.2–12.9)
POTASSIUM SERPL-SCNC: 5 MMOL/L (ref 3.5–5.1)
POTASSIUM SERPL-SCNC: 5 MMOL/L (ref 3.5–5.1)
POTASSIUM SERPL-SCNC: 5.2 MMOL/L (ref 3.5–5.1)
PROT SERPL-MCNC: 6.3 G/DL (ref 6–8.4)
RBC # BLD AUTO: 4.16 M/UL (ref 4–5.4)
SODIUM SERPL-SCNC: 134 MMOL/L (ref 136–145)
SODIUM SERPL-SCNC: 134 MMOL/L (ref 136–145)
SODIUM SERPL-SCNC: 135 MMOL/L (ref 136–145)
WBC # BLD AUTO: 8.3 K/UL (ref 3.9–12.7)

## 2024-11-28 PROCEDURE — 20600001 HC STEP DOWN PRIVATE ROOM

## 2024-11-28 PROCEDURE — 25000003 PHARM REV CODE 250

## 2024-11-28 PROCEDURE — 94640 AIRWAY INHALATION TREATMENT: CPT

## 2024-11-28 PROCEDURE — 36415 COLL VENOUS BLD VENIPUNCTURE: CPT

## 2024-11-28 PROCEDURE — 99900035 HC TECH TIME PER 15 MIN (STAT)

## 2024-11-28 PROCEDURE — 99900031 HC PATIENT EDUCATION (STAT)

## 2024-11-28 PROCEDURE — 27000221 HC OXYGEN, UP TO 24 HOURS

## 2024-11-28 PROCEDURE — 97165 OT EVAL LOW COMPLEX 30 MIN: CPT

## 2024-11-28 PROCEDURE — 85025 COMPLETE CBC W/AUTO DIFF WBC: CPT

## 2024-11-28 PROCEDURE — 94761 N-INVAS EAR/PLS OXIMETRY MLT: CPT

## 2024-11-28 PROCEDURE — 97535 SELF CARE MNGMENT TRAINING: CPT

## 2024-11-28 PROCEDURE — 80053 COMPREHEN METABOLIC PANEL: CPT

## 2024-11-28 PROCEDURE — 80048 BASIC METABOLIC PNL TOTAL CA: CPT

## 2024-11-28 PROCEDURE — 63600175 PHARM REV CODE 636 W HCPCS

## 2024-11-28 PROCEDURE — 94799 UNLISTED PULMONARY SVC/PX: CPT

## 2024-11-28 PROCEDURE — 25000242 PHARM REV CODE 250 ALT 637 W/ HCPCS

## 2024-11-28 RX ADMIN — POLYETHYLENE GLYCOL 3350 17 G: 17 POWDER, FOR SOLUTION ORAL at 08:11

## 2024-11-28 RX ADMIN — IPRATROPIUM BROMIDE AND ALBUTEROL SULFATE 3 ML: 2.5; .5 SOLUTION RESPIRATORY (INHALATION) at 10:11

## 2024-11-28 RX ADMIN — OXYCODONE HYDROCHLORIDE 10 MG: 10 TABLET ORAL at 06:11

## 2024-11-28 RX ADMIN — SENNOSIDES AND DOCUSATE SODIUM 1 TABLET: 50; 8.6 TABLET ORAL at 09:11

## 2024-11-28 RX ADMIN — ENOXAPARIN SODIUM 40 MG: 40 INJECTION SUBCUTANEOUS at 04:11

## 2024-11-28 RX ADMIN — OXYCODONE HYDROCHLORIDE 10 MG: 10 TABLET ORAL at 12:11

## 2024-11-28 RX ADMIN — OXYCODONE HYDROCHLORIDE 10 MG: 10 TABLET ORAL at 04:11

## 2024-11-28 RX ADMIN — OXYCODONE HYDROCHLORIDE 10 MG: 10 TABLET ORAL at 09:11

## 2024-11-28 RX ADMIN — IPRATROPIUM BROMIDE AND ALBUTEROL SULFATE 3 ML: 2.5; .5 SOLUTION RESPIRATORY (INHALATION) at 07:11

## 2024-11-28 RX ADMIN — GABAPENTIN 300 MG: 300 CAPSULE ORAL at 09:11

## 2024-11-28 RX ADMIN — OXYCODONE 5 MG: 5 TABLET ORAL at 12:11

## 2024-11-28 RX ADMIN — IPRATROPIUM BROMIDE AND ALBUTEROL SULFATE 3 ML: 2.5; .5 SOLUTION RESPIRATORY (INHALATION) at 02:11

## 2024-11-28 RX ADMIN — SENNOSIDES AND DOCUSATE SODIUM 1 TABLET: 50; 8.6 TABLET ORAL at 08:11

## 2024-11-28 RX ADMIN — CEFAZOLIN 2 G: 2 INJECTION, POWDER, FOR SOLUTION INTRAMUSCULAR; INTRAVENOUS at 04:11

## 2024-11-28 NOTE — PT/OT/SLP EVAL
Occupational Therapy   Evaluation and Discharge Summary    Name: Christin Luna  MRN: [id number]  Admitting Diagnosis: Pneumothorax  Recent Surgery: Procedure(s) (LRB):  VATS, WITH PLEURODESIS mechanical and chemical pleruodesis (Left)  BLOCK, NERVE, INTERCOSTAL, 2 OR MORE 1 Day Post-Op    Recommendations:     Discharge Recommendations: No Therapy Indicated  Discharge Equipment Recommendations:  none  Barriers to discharge:  None  Nursing staff Recommendations: SPV/SBA to walk in the hallway, bathroom, and shower stall.   Assessment:     Christin Luna is a 64 y.o. female with a medical diagnosis of Pneumothorax.  She presents with good recovery process from her recent procedure. Performance deficits affecting function: other (comment) (none).      Patient Discharged from acute Occupational Therapy on 11/28/24.  Please refer to this note dated for functional status.    Reasons for Discontinuation of Therapy Services  Satisfactory goal achievement. and Therapist determines that the patient will no longer benefit from therapy services.   Plan:     Patient was seen for an evaluation and tx to address ADLs and assess for any functional deficits that would benefit from further skilled OT via self-care/home management. Pt has no further needs.   Plan of Care Expires: 11/28/24  Plan of Care Reviewed with: patient, daughter    Subjective     Chief Complaint: discomfort, see below  Patient/Family Comments/goals: return home    Occupational Profile:  Living Environment: Lives with her spouse and daughter in a [facility] with threshold step to enter. Has a walk in shower.   Previous level of function: Independent with ADLs and iADLs. Driving and working at her [employer] shop.   Roles and Routines: active  Equipment Used at Home: none  Assistance upon Discharge: family    Pain/Comfort:  Pain Rating 1: 2/10  Location - Side 1: Left  Location - Orientation 1: anterior  Location 1: rib(s)  Pain Addressed 1: Distraction,  Reposition    Patients cultural, spiritual, Orthodoxy conflicts given the current situation: no    Objective:     Communicated with: RN prior to session.  Patient found HOB elevated with telemetry, chest tube (cleared by nursing to be on water seat away from bed) upon OT entry to room.    General Precautions: Standard, fall  Orthopedic Precautions: N/A  Braces: N/A  Respiratory Status: Nasal cannula, flow 2 L/min. Tolerated well on room air during functional mobility and ADLs.    Occupational Performance:    Bed Mobility:    Patient completed Scooting/Bridging with modified independence  Patient completed Supine to Sit with modified independence    Functional Mobility/Transfers:  Patient completed Sit <> Stand Transfer with supervision  with  no assistive device   Patient completed Toilet Transfer Step Transfer technique with stand by assistance with  no AD  Patient completed  Shower Transfer Step Transfer technique with stand by assistance with no AD  Functional Mobility: SPV gait to the bathroom and to the hallway without use of DME.      Activities of Daily Living:  Toileting: modified independence urinary movement    Cognitive/Visual Perceptual:  Cognitive/Psychosocial Skills:     -       Oriented to: Person, Place, Time, and Situation   -       Follows Commands/attention:Follows multistep  commands  -       Memory: No Deficits noted  -       Safety awareness/insight to disability: intact   -       Mood/Affect/Coping skills/emotional control: Appropriate to situation    Physical Exam:  Balance:    -       good  Sensation:    -       Intact  Motor Planning:    -       good, intact  Dominant hand:    -       R  Upper Extremity Range of Motion:     -       Right Upper Extremity: WNL  -       Left Upper Extremity: WNL  Upper Extremity Strength:    -       Right Upper Extremity: WNL  -       Left Upper Extremity: WNL   Strength:    -       Right Upper Extremity: WNL  -       Left Upper Extremity: WNL  Fine Motor  Coordination:    -       Intact  Gross motor coordination:   WFL    AMPAC 6 Click ADL:  AMPAC Total Score: 23    Treatment & Education:  Pt educated on the following topics:  OT 's plan of care and purpose of visit, ADLs, importance of increased activity in hospital setting, transfer training, body mechanics, modifications/compensatory strategies, sequencing, safety precautions, fall prevention, post-op precautions, home safety, and to call for assistance with call button. Educated also to be up in the chair for all meal times and to take walks in the hallway 2-3x with family or nursing staff to assist with managing the chest tube. Emphasized the importance of frequency of activity versus duration.   Understanding was verbalized.     Patient left up in chair with all lines intact, call button in reach, and RN notified    GOALS:   Multidisciplinary Problems       Occupational Therapy Goals       Not on file                    History:     History reviewed. No pertinent past medical history.      Past Surgical History:   Procedure Laterality Date    INJECTION OF ANESTHETIC AGENT AROUND MULTIPLE INTERCOSTAL NERVES  11/27/2024    Procedure: BLOCK, NERVE, INTERCOSTAL, 2 OR MORE;  Surgeon: Farshad Valdovinos MD;  Location: [address];  Service: Cardiothoracic;;    PLEURODESIS WITH VIDEO-ASSISTED THORACOSCOPIC SURGERY (VATS) Left 11/27/2024    Procedure: VATS, WITH PLEURODESIS mechanical and chemical pleruodesis;  Surgeon: Farshad Valdovinos MD;  Location: [address];  Service: Cardiothoracic;  Laterality: Left;       Time Tracking:     OT Date of Treatment: 11/28/24  OT Start Time: 1049  OT Stop Time: 1109  OT Total Time (min): 20 min    Billable Minutes:Evaluation 10  Self Care/Home Management 10    11/28/2024

## 2024-11-28 NOTE — SUBJECTIVE & OBJECTIVE
Interval History:  No acute events overnight.  On 2 L nasal cannula oxygen this morning.  Chest tube in place with appropriate output, to suction, air leak seems to have resolved.  Pain is well-controlled. afebrile hemodynamically stable.    Medications:  Continuous Infusions:  Scheduled Meds:   albuterol-ipratropium  3 mL Nebulization Q6H WAKE    enoxparin  40 mg Subcutaneous Daily    gabapentin  300 mg Oral QHS    polyethylene glycol  17 g Oral Daily    senna-docusate 8.6-50 mg  1 tablet Oral BID     PRN Meds:  Current Facility-Administered Medications:     acetaminophen, 650 mg, Oral, Q6H PRN    bisacodyL, 10 mg, Rectal, Daily PRN    dextrose 10%, 12.5 g, Intravenous, PRN    dextrose 10%, 25 g, Intravenous, PRN    glucagon (human recombinant), 1 mg, Intramuscular, PRN    glucose, 16 g, Oral, PRN    glucose, 24 g, Oral, PRN    naloxone, 0.02 mg, Intravenous, PRN    ondansetron, 8 mg, Oral, Q8H PRN    oxyCODONE, 5 mg, Oral, Q4H PRN    oxyCODONE, 10 mg, Oral, Q4H PRN    sodium chloride 0.9%, 10 mL, Intravenous, Q12H PRN     Review of patient's allergies indicates:  No Known Allergies  Objective:     Vital Signs (Most Recent):  Temp: 98.8 °F (37.1 °C) (11/28/24 1201)  Pulse: 68 (11/28/24 1201)  Resp: 18 (11/28/24 1226)  BP: 111/65 (11/28/24 1201)  SpO2: (!) 94 % (11/28/24 1230) Vital Signs (24h Range):  Temp:  [97.2 °F (36.2 °C)-98.8 °F (37.1 °C)] 98.8 °F (37.1 °C)  Pulse:  [53-88] 68  Resp:  [15-22] 18  SpO2:  [88 %-97 %] 94 %  BP: (111-165)/(61-81) 111/65     Weight: 52 kg (114 lb 12 oz)  Body mass index is 20.33 kg/m².    Intake/Output - Last 3 Shifts         11/26 0700 11/27 0659 11/27 0700 11/28 0659 11/28 0700 11/29 0659    P.O.  240 120    IV Piggyback  400     Total Intake(mL/kg)  640 (12.3) 120 (2.3)    Urine (mL/kg/hr)  600 (0.5) 200 (0.6)    Emesis/NG output  0     Other  0     Stool  0     Blood  0     Chest Tube  162     Total Output  762 200    Net  -122 -80           Urine Occurrence 4 x  1 x     Stool Occurrence 0 x 0 x              Physical Exam  Constitutional:       General: She is not in acute distress.     Appearance: Normal appearance. She is not diaphoretic.   HENT:      Head: Normocephalic and atraumatic.   Eyes:      Pupils: Pupils are equal, round, and reactive to light.   Cardiovascular:      Rate and Rhythm: Normal rate and regular rhythm.   Pulmonary:      Effort: Pulmonary effort is normal. No respiratory distress.      Comments: Normal work of breathing.  Left-sided chest tube in place with intermittent air leak.  To suction  Abdominal:      General: There is no distension.      Palpations: Abdomen is soft.   Skin:     General: Skin is warm and dry.   Neurological:      General: No focal deficit present.      Mental Status: She is alert and oriented to person, place, and time.   Psychiatric:         Mood and Affect: Mood normal.         Behavior: Behavior normal.         Thought Content: Thought content normal.         Judgment: Judgment normal.          Significant Labs:  I have reviewed all pertinent lab results within the past 24 hours.  CBC:   Recent Labs   Lab 11/28/24 0227   WBC 8.30   RBC 4.16   HGB 13.2   HCT 39.0      MCV 94   MCH 31.7*   MCHC 33.8     CMP:   Recent Labs   Lab 11/28/24 0227   GLU 92  93  93   CALCIUM 9.7  9.8  9.8   ALBUMIN 3.4*   PROT 6.3   *  134*  134*   K 5.2*  5.0  5.0   CO2 19*  20*  20*     104  104   BUN 16  17  17   CREATININE 0.7  0.7  0.7   ALKPHOS 79   ALT 9*   AST 21   BILITOT 0.4       Significant Diagnostics:  I have reviewed all pertinent imaging results/findings within the past 24 hours.

## 2024-11-28 NOTE — ASSESSMENT & PLAN NOTE
Pt is a 65 yo female with recurrent spontaneous pneumothorax.      - status post VATS with chemical and mechanical pleurodesis on 11/27/2024.  - doing well this morning, air leak seems to have resolved, CXR demonstrates well inflated lung with no pneumothorax  - okay for regular diet  - keep chest tube to suction until tomorrow morning  - Please call or page with any questions

## 2024-11-28 NOTE — PROGRESS NOTES
[facility]  General Surgery  Progress Note    Subjective:     History of Present Illness:  No notes on file    Post-Op Info:  Procedure(s) (LRB):  VATS, WITH PLEURODESIS mechanical and chemical pleruodesis (Left)  BLOCK, NERVE, INTERCOSTAL, 2 OR MORE   1 Day Post-Op     Interval History:  No acute events overnight.  On 2 L nasal cannula oxygen this morning.  Chest tube in place with appropriate output, to suction, air leak seems to have resolved.  Pain is well-controlled. afebrile hemodynamically stable.    Medications:  Continuous Infusions:  Scheduled Meds:   albuterol-ipratropium  3 mL Nebulization Q6H WAKE    enoxparin  40 mg Subcutaneous Daily    gabapentin  300 mg Oral QHS    polyethylene glycol  17 g Oral Daily    senna-docusate 8.6-50 mg  1 tablet Oral BID     PRN Meds:  Current Facility-Administered Medications:     acetaminophen, 650 mg, Oral, Q6H PRN    bisacodyL, 10 mg, Rectal, Daily PRN    dextrose 10%, 12.5 g, Intravenous, PRN    dextrose 10%, 25 g, Intravenous, PRN    glucagon (human recombinant), 1 mg, Intramuscular, PRN    glucose, 16 g, Oral, PRN    glucose, 24 g, Oral, PRN    naloxone, 0.02 mg, Intravenous, PRN    ondansetron, 8 mg, Oral, Q8H PRN    oxyCODONE, 5 mg, Oral, Q4H PRN    oxyCODONE, 10 mg, Oral, Q4H PRN    sodium chloride 0.9%, 10 mL, Intravenous, Q12H PRN     Review of patient's allergies indicates:  No Known Allergies  Objective:     Vital Signs (Most Recent):  Temp: 98.8 °F (37.1 °C) (11/28/24 1201)  Pulse: 68 (11/28/24 1201)  Resp: 18 (11/28/24 1226)  BP: 111/65 (11/28/24 1201)  SpO2: (!) 94 % (11/28/24 1230) Vital Signs (24h Range):  Temp:  [97.2 °F (36.2 °C)-98.8 °F (37.1 °C)] 98.8 °F (37.1 °C)  Pulse:  [53-88] 68  Resp:  [15-22] 18  SpO2:  [88 %-97 %] 94 %  BP: (111-165)/(61-81) 111/65     Weight: 52 kg (114 lb 12 oz)  Body mass index is 20.33 kg/m².    Intake/Output - Last 3 Shifts         11/26 0700 11/27 0659 11/27 0700 11/28 0659 11/28 0700 11/29 0659    P.O.  240  120    IV Piggyback  400     Total Intake(mL/kg)  640 (12.3) 120 (2.3)    Urine (mL/kg/hr)  600 (0.5) 200 (0.6)    Emesis/NG output  0     Other  0     Stool  0     Blood  0     Chest Tube  162     Total Output  762 200    Net  -122 -80           Urine Occurrence 4 x  1 x    Stool Occurrence 0 x 0 x              Physical Exam  Constitutional:       General: She is not in acute distress.     Appearance: Normal appearance. She is not diaphoretic.   HENT:      Head: Normocephalic and atraumatic.   Eyes:      Pupils: Pupils are equal, round, and reactive to light.   Cardiovascular:      Rate and Rhythm: Normal rate and regular rhythm.   Pulmonary:      Effort: Pulmonary effort is normal. No respiratory distress.      Comments: Normal work of breathing.  Left-sided chest tube in place with intermittent air leak.  To suction  Abdominal:      General: There is no distension.      Palpations: Abdomen is soft.   Skin:     General: Skin is warm and dry.   Neurological:      General: No focal deficit present.      Mental Status: She is alert and oriented to person, place, and time.   Psychiatric:         Mood and Affect: Mood normal.         Behavior: Behavior normal.         Thought Content: Thought content normal.         Judgment: Judgment normal.          Significant Labs:  I have reviewed all pertinent lab results within the past 24 hours.  CBC:   Recent Labs   Lab 11/28/24 0227   WBC 8.30   RBC 4.16   HGB 13.2   HCT 39.0      MCV 94   MCH 31.7*   MCHC 33.8     CMP:   Recent Labs   Lab 11/28/24 0227   GLU 92  93  93   CALCIUM 9.7  9.8  9.8   ALBUMIN 3.4*   PROT 6.3   *  134*  134*   K 5.2*  5.0  5.0   CO2 19*  20*  20*     104  104   BUN 16  17  17   CREATININE 0.7  0.7  0.7   ALKPHOS 79   ALT 9*   AST 21   BILITOT 0.4       Significant Diagnostics:  I have reviewed all pertinent imaging results/findings within the past 24 hours.  Assessment/Plan:     Centrilobular  emphysema  x        Baldev Tom MD  General Surgery  [facility]

## 2024-11-28 NOTE — ANESTHESIA POSTPROCEDURE EVALUATION
Anesthesia Post Evaluation    Patient: Christin Luna    Procedure(s) Performed: Procedure(s) (LRB):  VATS, WITH PLEURODESIS mechanical and chemical pleruodesis (Left)  BLOCK, NERVE, INTERCOSTAL, 2 OR MORE    Final Anesthesia Type: general      Patient location during evaluation: PACU  Patient participation: Yes- Able to Participate  Level of consciousness: awake and alert  Post-procedure vital signs: reviewed and stable  Pain management: adequate  Airway patency: patent    PONV status at discharge: No PONV  Anesthetic complications: no      Cardiovascular status: blood pressure returned to baseline  Respiratory status: unassisted  Hydration status: euvolemic  Follow-up not needed.              Vitals Value Taken Time   /63 11/27/24 2029   Temp 36.4 °C (97.6 °F) 11/27/24 2029   Pulse 67 11/27/24 2029   Resp 16 11/27/24 2034   SpO2 93 % 11/27/24 2029         Event Time   Out of Recovery 11/27/2024 14:30:00         Pain/Kevin Score: Pain Rating Prior to Med Admin: 5 (11/27/2024  8:34 PM)  Pain Rating Post Med Admin: 1 (11/27/2024  9:20 AM)  Kevin Score: 9 (11/27/2024  7:44 PM)

## 2024-11-28 NOTE — PROGRESS NOTES
[facility]  Thoracic Surgery  Progress Note    Subjective:     History of Present Illness:  No notes on file    Post-Op Info:  Procedure(s) (LRB):  VATS, WITH PLEURODESIS mechanical and chemical pleruodesis (Left)  BLOCK, NERVE, INTERCOSTAL, 2 OR MORE   1 Day Post-Op     Interval History:   No acute events overnight.  On 2 L nasal cannula oxygen this morning.  Chest tube in place with appropriate output, to suction, air leak seems to have resolved.  Pain is well-controlled. afebrile hemodynamically stable.     Medications:  Continuous Infusions:  Scheduled Meds:   albuterol-ipratropium  3 mL Nebulization Q6H WAKE    enoxparin  40 mg Subcutaneous Daily    gabapentin  300 mg Oral QHS    polyethylene glycol  17 g Oral Daily    senna-docusate 8.6-50 mg  1 tablet Oral BID     PRN Meds:  Current Facility-Administered Medications:     acetaminophen, 650 mg, Oral, Q6H PRN    bisacodyL, 10 mg, Rectal, Daily PRN    dextrose 10%, 12.5 g, Intravenous, PRN    dextrose 10%, 25 g, Intravenous, PRN    glucagon (human recombinant), 1 mg, Intramuscular, PRN    glucose, 16 g, Oral, PRN    glucose, 24 g, Oral, PRN    naloxone, 0.02 mg, Intravenous, PRN    ondansetron, 8 mg, Oral, Q8H PRN    oxyCODONE, 5 mg, Oral, Q4H PRN    oxyCODONE, 10 mg, Oral, Q4H PRN    sodium chloride 0.9%, 10 mL, Intravenous, Q12H PRN     Review of patient's allergies indicates:  No Known Allergies  Objective:     Vital Signs (Most Recent):  Temp: 98.4 °F (36.9 °C) (11/28/24 0718)  Pulse: 68 (11/28/24 0718)  Resp: 19 (11/28/24 0718)  BP: 138/81 (11/28/24 0718)  SpO2: (!) 91 % (11/28/24 0718) Vital Signs (24h Range):  Temp:  [97.2 °F (36.2 °C)-98.4 °F (36.9 °C)] 98.4 °F (36.9 °C)  Pulse:  [53-69] 68  Resp:  [15-34] 19  SpO2:  [88 %-97 %] 91 %  BP: (121-165)/(61-81) 138/81     Intake/Output - Last 3 Shifts         11/26 0700 11/27 0659 11/27 0700 11/28 0659 11/28 0700 11/29 0659    P.O.  240     IV Piggyback  400     Total Intake(mL/kg)  640 (12.3)      Urine (mL/kg/hr)  600 (0.5)     Emesis/NG output  0     Other  0     Stool  0     Blood  0     Chest Tube  162     Total Output  762     Net  -122            Urine Occurrence 4 x  1 x    Stool Occurrence 0 x 0 x 1 x            SpO2: (!) 91 %        Physical Exam  Constitutional:       General: She is not in acute distress.     Appearance: Normal appearance. She is not diaphoretic.   HENT:      Head: Normocephalic and atraumatic.   Eyes:      Pupils: Pupils are equal, round, and reactive to light.   Cardiovascular:      Rate and Rhythm: Normal rate and regular rhythm.   Pulmonary:      Effort: Pulmonary effort is normal. No respiratory distress.      Comments: Normal work of breathing.  Left-sided chest tube in place with no air leak.  To suction  Abdominal:      General: There is no distension.      Palpations: Abdomen is soft.   Skin:     General: Skin is warm and dry.   Neurological:      General: No focal deficit present.      Mental Status: She is alert and oriented to person, place, and time.   Psychiatric:         Mood and Affect: Mood normal.         Behavior: Behavior normal.         Thought Content: Thought content normal.         Judgment: Judgment normal.            Significant Labs:  CBC:   Recent Labs   Lab 11/28/24 0227   WBC 8.30   RBC 4.16   HGB 13.2   HCT 39.0      MCV 94   MCH 31.7*   MCHC 33.8     CMP:   Recent Labs   Lab 11/28/24 0227   GLU 92  93  93   CALCIUM 9.7  9.8  9.8   ALBUMIN 3.4*   PROT 6.3   *  134*  134*   K 5.2*  5.0  5.0   CO2 19*  20*  20*     104  104   BUN 16  17  17   CREATININE 0.7  0.7  0.7   ALKPHOS 79   ALT 9*   AST 21   BILITOT 0.4       Significant Diagnostics:  I have reviewed all pertinent imaging results/findings within the past 24 hours.    VTE Risk Mitigation (From admission, onward)           Ordered     enoxaparin injection 40 mg  Daily         11/27/24 1413     IP VTE HIGH RISK PATIENT  Once         11/27/24 1413     [facility]  hose  Until discontinued         11/27/24 1413     Place sequential compression device  Until discontinued         11/27/24 1413     Place sequential compression device  Until discontinued         11/27/24 0252                  Assessment/Plan:     * Pneumothorax  Pt is a 65 yo female with recurrent spontaneous pneumothorax.      - status post VATS with chemical and mechanical pleurodesis on 11/27/2024.  - doing well this morning, air leak seems to have resolved, CXR demonstrates well inflated lung with no pneumothorax  - okay for regular diet  - keep chest tube to suction until tomorrow morning  - Please call or page with any questions        Baldev Tom MD  Thoracic Surgery  [facility]

## 2024-11-28 NOTE — PLAN OF CARE
[facility]  Initial Discharge Assessment       Primary Care Provider: Arthur Bauman MD    Admission Diagnosis: Pneumothorax [J93.9]    Admission Date: 11/26/2024  Expected Discharge Date: 11/29/2024    Transition of Care Barriers: None    Payor: BLUE CROSS BLUE SHIELD / Plan: BLUE CONNECT / Product Type: HMO /     Extended Emergency Contact Information  Primary Emergency Contact: Heladio beasley  Mobile Phone: [phone]  Relation: Spouse    Discharge Plan A: Home with family  Discharge Plan B: [facility]      [id number] - [place] - [address] [facility] [address]  [address] [address]  Phone: [phone] Fax: [phone]      Initial Assessment (most recent)       Adult Discharge Assessment - 11/28/24 1204          Discharge Assessment    Assessment Type Discharge Planning Assessment     Confirmed/corrected address, phone number and insurance Yes     Confirmed Demographics Correct on Facesheet     Source of Information patient     Does patient/caregiver understand observation status Yes     Communicated JOSE ELIAS with patient/caregiver Yes     People in Home spouse     Facility Arrived From: Home     Do you expect to return to your current living situation? Yes     Do you have help at home or someone to help you manage your care at home? Yes     Who are your caregiver(s) and their phone number(s)? Heladio Luna spouse      Prior to hospitilization cognitive status: Alert/Oriented;No Deficits     Current cognitive status: Alert/Oriented;No Deficits     Walking or Climbing Stairs Difficulty no     Dressing/Bathing Difficulty no     Home Accessibility wheelchair accessible     Home Layout Able to live on 1st floor     Equipment Currently Used at Home none     Readmission within 30 days? No     Patient currently being followed by outpatient case management? No     Do you currently have service(s) that help you manage your care at home? No     Do  you take prescription medications? Yes     Do you have prescription coverage? Yes     Coverage BC/BS     Do you have any problems affording any of your prescribed medications? No     Is the patient taking medications as prescribed? yes     Who is going to help you get home at discharge? Spouse Heladio Montgomerypard      How do you get to doctors appointments? car, drives self     Are you on dialysis? No     Do you take coumadin? No     Discharge Plan A Home with family     Discharge Plan B Home Health     DME Needed Upon Discharge  none     Discharge Plan discussed with: Spouse/sig other     Name(s) and Number(s) Heladio Luna Spouse      Transition of Care Barriers None        Physical Activity    On average, how many days per week do you engage in moderate to strenuous exercise (like a brisk walk)? 3 days     On average, how many minutes do you engage in exercise at this level? 30 min        Financial Resource Strain    How hard is it for you to pay for the very basics like food, housing, medical care, and heating? Not hard at all        Housing Stability    In the last 12 months, was there a time when you were not able to pay the mortgage or rent on time? No     At any time in the past 12 months, were you homeless or living in a shelter (including now)? No        Transportation Needs    Has the lack of transportation kept you from medical appointments, meetings, work or from getting things needed for daily living? No        Food Insecurity    Within the past 12 months, you worried that your food would run out before you got the money to buy more. Never true     Within the past 12 months, the food you bought just didn't last and you didn't have money to get more. Never true        Stress    Do you feel stress - tense, restless, nervous, or anxious, or unable to sleep at night because your mind is troubled all the time - these days? Not at all        Social Isolation    How often do you feel lonely  or isolated from those around you?  Never        Alcohol Use    Q1: How often do you have a drink containing alcohol? Never     Q2: How many drinks containing alcohol do you have on a typical day when you are drinking? Patient does not drink     Q3: How often do you have six or more drinks on one occasion? Never        Utilities    In the past 12 months has the electric, gas, oil, or water company threatened to shut off services in your home? No        Health Literacy    How often do you need to have someone help you when you read instructions, pamphlets, or other written material from your doctor or pharmacy? Never        OTHER    Name(s) of People in Home Heladio Hernandez                    Discharge Plan A and Plan B have been determined by review of patient's clinical status, future medical and therapeutic needs, and coverage/benefits for post-acute care in coordination with multidisciplinary team members.

## 2024-11-28 NOTE — SUBJECTIVE & OBJECTIVE
Interval History:   11/28: some pain around site of chest tube insertion.     Review of Systems  Objective:     Vital Signs (Most Recent):  Temp: 98.8 °F (37.1 °C) (11/28/24 1201)  Pulse: 68 (11/28/24 1201)  Resp: 18 (11/28/24 1226)  BP: 111/65 (11/28/24 1201)  SpO2: (!) 94 % (11/28/24 1230) Vital Signs (24h Range):  Temp:  [97.2 °F (36.2 °C)-98.8 °F (37.1 °C)] 98.8 °F (37.1 °C)  Pulse:  [53-88] 68  Resp:  [15-22] 18  SpO2:  [88 %-97 %] 94 %  BP: (111-165)/(61-81) 111/65     Weight: 52 kg (114 lb 12 oz)  Body mass index is 20.33 kg/m².    Intake/Output Summary (Last 24 hours) at 11/28/2024 1332  Last data filed at 11/28/2024 1233  Gross per 24 hour   Intake 760 ml   Output 962 ml   Net -202 ml         Physical Exam  Constitutional:       General: She is not in acute distress.     Appearance: Normal appearance. She is not diaphoretic.   HENT:      Head: Normocephalic and atraumatic.   Eyes:      Pupils: Pupils are equal, round, and reactive to light.   Cardiovascular:      Rate and Rhythm: Normal rate and regular rhythm.   Pulmonary:      Effort: Pulmonary effort is normal. No respiratory distress.      Comments: Normal work of breathing.  Left-sided chest tube in place with intermittent air leak.  To suction  Abdominal:      General: There is no distension.      Palpations: Abdomen is soft.   Skin:     General: Skin is warm and dry.   Neurological:      General: No focal deficit present.      Mental Status: She is alert and oriented to person, place, and time.   Psychiatric:         Mood and Affect: Mood normal.         Behavior: Behavior normal.         Thought Content: Thought content normal.         Judgment: Judgment normal.             Significant Labs: All pertinent labs within the past 24 hours have been reviewed.    Significant Imaging: I have reviewed all pertinent imaging results/findings within the past 24 hours.

## 2024-11-28 NOTE — PROGRESS NOTES
"[facility] Plan of Care Note    Dx:   Pneumothorax [J93.9]    Procedure(s) (LRB):  VATS, WITH PLEURODESIS mechanical and chemical pleruodesis (Left)    Shift Events: no significant event    Goals of Care: Pain control,     Neuro: AAOx4    Vital Signs: /61 (BP Location: Right arm, Patient Position: Lying)   Pulse 63   Temp 97.8 °F (36.6 °C) (Oral)   Resp 16   Ht 5' 3" (1.6 m)   Wt 52 kg (114 lb 12 oz)   LMP  (LMP Unknown)   SpO2 (!) 93%   Breastfeeding No   BMI 20.33 kg/m²     Respiratory: 2L NC    Diet: Diet Adult Regular    Is patient tolerating current diet? yes    GTTS: none    Urine Output/Bowel Movement:   I/O this shift:  In: 240 [P.O.:240]  Out: 762 [Urine:600; Chest Tube:162]  Last Bowel Movement: 11/24/24    Drains/Tubes/Tube Feeds (include total output/shift):   I/O this shift:  In: 240 [P.O.:240]  Out: 762 [Urine:600; Chest Tube:162]      Lines: PVI RAC #20; #20 R Hand; LFA #20     Accuchecks: no     Skin: surgical incision     Fall Risk Score: 2     Activity level? Stand by     Any scheduled procedures? none     Any safety concerns? Bleeding  "

## 2024-11-28 NOTE — PROGRESS NOTES
[facility]  Progress Note    Patient Name: Christin Luna  MRN: [id number]  Patient Class: IP- Inpatient   Admission Date: 11/26/2024  Length of Stay: 2 days  Attending Physician: Kenya Rangel MD  Primary Care Provider: Arthur Bauman MD        Subjective:     Principal Problem:Pneumothorax        HPI:      Overview/Hospital Course:  11/27 Underwent pleurodesis today.    Interval History:   11/28: some pain around site of chest tube insertion.     Review of Systems  Objective:     Vital Signs (Most Recent):  Temp: 98.8 °F (37.1 °C) (11/28/24 1201)  Pulse: 68 (11/28/24 1201)  Resp: 18 (11/28/24 1226)  BP: 111/65 (11/28/24 1201)  SpO2: (!) 94 % (11/28/24 1230) Vital Signs (24h Range):  Temp:  [97.2 °F (36.2 °C)-98.8 °F (37.1 °C)] 98.8 °F (37.1 °C)  Pulse:  [53-88] 68  Resp:  [15-22] 18  SpO2:  [88 %-97 %] 94 %  BP: (111-165)/(61-81) 111/65     Weight: 52 kg (114 lb 12 oz)  Body mass index is 20.33 kg/m².    Intake/Output Summary (Last 24 hours) at 11/28/2024 1332  Last data filed at 11/28/2024 1233  Gross per 24 hour   Intake 760 ml   Output 962 ml   Net -202 ml         Physical Exam  Constitutional:       General: She is not in acute distress.     Appearance: Normal appearance. She is not diaphoretic.   HENT:      Head: Normocephalic and atraumatic.   Eyes:      Pupils: Pupils are equal, round, and reactive to light.   Cardiovascular:      Rate and Rhythm: Normal rate and regular rhythm.   Pulmonary:      Effort: Pulmonary effort is normal. No respiratory distress.      Comments: Normal work of breathing.  Left-sided chest tube in place with intermittent air leak.  To suction  Abdominal:      General: There is no distension.      Palpations: Abdomen is soft.   Skin:     General: Skin is warm and dry.   Neurological:      General: No focal deficit present.      Mental Status: She is alert and oriented to person, place, and time.   Psychiatric:         Mood and Affect: Mood normal.          Behavior: Behavior normal.         Thought Content: Thought content normal.         Judgment: Judgment normal.             Significant Labs: All pertinent labs within the past 24 hours have been reviewed.    Significant Imaging: I have reviewed all pertinent imaging results/findings within the past 24 hours.    Assessment/Plan:      * Pneumothorax  Pt is a 65 yo female with recurrent spontaneous pneumothorax.      OR today for VATS with pleurodesis  - Risks and benefits of continued conservative management versus VATS with pleurodesis discussed with patient. Informed consent obtained.   - NPO for procedure  Incentive spirometry after        Centrilobular emphysema  Patient's COPD is controlled currently.  Patient is currently off COPD Pathway. Continue scheduled inhalers Steroids, Antibiotics, and Supplemental oxygen and monitor respiratory status closely.       VTE Risk Mitigation (From admission, onward)           Ordered     enoxaparin injection 40 mg  Daily         11/27/24 1413     IP VTE HIGH RISK PATIENT  Once         11/27/24 1413     Place WEI hose  Until discontinued         11/27/24 1413     Place sequential compression device  Until discontinued         11/27/24 1413     Place sequential compression device  Until discontinued         11/27/24 0252                    Discharge Planning   JOSE ELIAS: 11/29/2024     Code Status: Full Code   Is the patient medically ready for discharge?:     Reason for patient still in hospital (select all that apply): Patient trending condition  Discharge Plan A: Home with family                  Kenya Rangel MD  Department of Hospital Medicine   [facility]

## 2024-11-28 NOTE — SUBJECTIVE & OBJECTIVE
Interval History:   No acute events overnight.  On 2 L nasal cannula oxygen this morning.  Chest tube in place with appropriate output, to suction, air leak seems to have resolved.  Pain is well-controlled. afebrile hemodynamically stable.     Medications:  Continuous Infusions:  Scheduled Meds:   albuterol-ipratropium  3 mL Nebulization Q6H WAKE    enoxparin  40 mg Subcutaneous Daily    gabapentin  300 mg Oral QHS    polyethylene glycol  17 g Oral Daily    senna-docusate 8.6-50 mg  1 tablet Oral BID     PRN Meds:  Current Facility-Administered Medications:     acetaminophen, 650 mg, Oral, Q6H PRN    bisacodyL, 10 mg, Rectal, Daily PRN    dextrose 10%, 12.5 g, Intravenous, PRN    dextrose 10%, 25 g, Intravenous, PRN    glucagon (human recombinant), 1 mg, Intramuscular, PRN    glucose, 16 g, Oral, PRN    glucose, 24 g, Oral, PRN    naloxone, 0.02 mg, Intravenous, PRN    ondansetron, 8 mg, Oral, Q8H PRN    oxyCODONE, 5 mg, Oral, Q4H PRN    oxyCODONE, 10 mg, Oral, Q4H PRN    sodium chloride 0.9%, 10 mL, Intravenous, Q12H PRN     Review of patient's allergies indicates:  No Known Allergies  Objective:     Vital Signs (Most Recent):  Temp: 98.4 °F (36.9 °C) (11/28/24 0718)  Pulse: 68 (11/28/24 0718)  Resp: 19 (11/28/24 0718)  BP: 138/81 (11/28/24 0718)  SpO2: (!) 91 % (11/28/24 0718) Vital Signs (24h Range):  Temp:  [97.2 °F (36.2 °C)-98.4 °F (36.9 °C)] 98.4 °F (36.9 °C)  Pulse:  [53-69] 68  Resp:  [15-34] 19  SpO2:  [88 %-97 %] 91 %  BP: (121-165)/(61-81) 138/81     Intake/Output - Last 3 Shifts         11/26 0700 11/27 0659 11/27 0700 11/28 0659 11/28 0700 11/29 0659    P.O.  240     IV Piggyback  400     Total Intake(mL/kg)  640 (12.3)     Urine (mL/kg/hr)  600 (0.5)     Emesis/NG output  0     Other  0     Stool  0     Blood  0     Chest Tube  162     Total Output  762     Net  -122            Urine Occurrence 4 x  1 x    Stool Occurrence 0 x 0 x 1 x            SpO2: (!) 91 %        Physical Exam  Constitutional:        General: She is not in acute distress.     Appearance: Normal appearance. She is not diaphoretic.   HENT:      Head: Normocephalic and atraumatic.   Eyes:      Pupils: Pupils are equal, round, and reactive to light.   Cardiovascular:      Rate and Rhythm: Normal rate and regular rhythm.   Pulmonary:      Effort: Pulmonary effort is normal. No respiratory distress.      Comments: Normal work of breathing.  Left-sided chest tube in place with no air leak.  To suction  Abdominal:      General: There is no distension.      Palpations: Abdomen is soft.   Skin:     General: Skin is warm and dry.   Neurological:      General: No focal deficit present.      Mental Status: She is alert and oriented to person, place, and time.   Psychiatric:         Mood and Affect: Mood normal.         Behavior: Behavior normal.         Thought Content: Thought content normal.         Judgment: Judgment normal.            Significant Labs:  CBC:   Recent Labs   Lab 11/28/24 0227   WBC 8.30   RBC 4.16   HGB 13.2   HCT 39.0      MCV 94   MCH 31.7*   MCHC 33.8     CMP:   Recent Labs   Lab 11/28/24 0227   GLU 92  93  93   CALCIUM 9.7  9.8  9.8   ALBUMIN 3.4*   PROT 6.3   *  134*  134*   K 5.2*  5.0  5.0   CO2 19*  20*  20*     104  104   BUN 16  17  17   CREATININE 0.7  0.7  0.7   ALKPHOS 79   ALT 9*   AST 21   BILITOT 0.4       Significant Diagnostics:  I have reviewed all pertinent imaging results/findings within the past 24 hours.    VTE Risk Mitigation (From admission, onward)           Ordered     enoxaparin injection 40 mg  Daily         11/27/24 1413     IP VTE HIGH RISK PATIENT  Once         11/27/24 1413     Place WEI hose  Until discontinued         11/27/24 1413     Place sequential compression device  Until discontinued         11/27/24 1413     Place sequential compression device  Until discontinued         11/27/24 0252

## 2024-11-29 DIAGNOSIS — J93.9 PNEUMOTHORAX, UNSPECIFIED TYPE: Primary | ICD-10-CM

## 2024-11-29 DIAGNOSIS — J93.11 PRIMARY SPONTANEOUS PNEUMOTHORAX: ICD-10-CM

## 2024-11-29 LAB
ALBUMIN SERPL BCP-MCNC: 3.1 G/DL (ref 3.5–5.2)
ALP SERPL-CCNC: 75 U/L (ref 40–150)
ALT SERPL W/O P-5'-P-CCNC: 11 U/L (ref 10–44)
ANION GAP SERPL CALC-SCNC: 10 MMOL/L (ref 8–16)
ANION GAP SERPL CALC-SCNC: 12 MMOL/L (ref 8–16)
AST SERPL-CCNC: 20 U/L (ref 10–40)
BASOPHILS # BLD AUTO: 0.05 K/UL (ref 0–0.2)
BASOPHILS NFR BLD: 0.6 % (ref 0–1.9)
BILIRUB SERPL-MCNC: 0.5 MG/DL (ref 0.1–1)
BUN SERPL-MCNC: 19 MG/DL (ref 8–23)
BUN SERPL-MCNC: 20 MG/DL (ref 8–23)
CALCIUM SERPL-MCNC: 8.6 MG/DL (ref 8.7–10.5)
CALCIUM SERPL-MCNC: 8.7 MG/DL (ref 8.7–10.5)
CHLORIDE SERPL-SCNC: 101 MMOL/L (ref 95–110)
CHLORIDE SERPL-SCNC: 102 MMOL/L (ref 95–110)
CO2 SERPL-SCNC: 23 MMOL/L (ref 23–29)
CO2 SERPL-SCNC: 23 MMOL/L (ref 23–29)
CREAT SERPL-MCNC: 0.6 MG/DL (ref 0.5–1.4)
CREAT SERPL-MCNC: 0.6 MG/DL (ref 0.5–1.4)
DIFFERENTIAL METHOD BLD: ABNORMAL
EOSINOPHIL # BLD AUTO: 0.4 K/UL (ref 0–0.5)
EOSINOPHIL NFR BLD: 4.2 % (ref 0–8)
ERYTHROCYTE [DISTWIDTH] IN BLOOD BY AUTOMATED COUNT: 12.9 % (ref 11.5–14.5)
EST. GFR  (NO RACE VARIABLE): >60 ML/MIN/1.73 M^2
EST. GFR  (NO RACE VARIABLE): >60 ML/MIN/1.73 M^2
GLUCOSE SERPL-MCNC: 86 MG/DL (ref 70–110)
GLUCOSE SERPL-MCNC: 86 MG/DL (ref 70–110)
HCT VFR BLD AUTO: 36.6 % (ref 37–48.5)
HGB BLD-MCNC: 12.1 G/DL (ref 12–16)
IMM GRANULOCYTES # BLD AUTO: 0.02 K/UL (ref 0–0.04)
IMM GRANULOCYTES NFR BLD AUTO: 0.2 % (ref 0–0.5)
LYMPHOCYTES # BLD AUTO: 3 K/UL (ref 1–4.8)
LYMPHOCYTES NFR BLD: 36.2 % (ref 18–48)
MCH RBC QN AUTO: 31.3 PG (ref 27–31)
MCHC RBC AUTO-ENTMCNC: 33.1 G/DL (ref 32–36)
MCV RBC AUTO: 95 FL (ref 82–98)
MONOCYTES # BLD AUTO: 0.8 K/UL (ref 0.3–1)
MONOCYTES NFR BLD: 10 % (ref 4–15)
NEUTROPHILS # BLD AUTO: 4.1 K/UL (ref 1.8–7.7)
NEUTROPHILS NFR BLD: 48.8 % (ref 38–73)
NRBC BLD-RTO: 0 /100 WBC
PLATELET # BLD AUTO: 230 K/UL (ref 150–450)
PMV BLD AUTO: 11.5 FL (ref 9.2–12.9)
POTASSIUM SERPL-SCNC: 4.6 MMOL/L (ref 3.5–5.1)
POTASSIUM SERPL-SCNC: 4.7 MMOL/L (ref 3.5–5.1)
PROT SERPL-MCNC: 5.7 G/DL (ref 6–8.4)
RBC # BLD AUTO: 3.87 M/UL (ref 4–5.4)
SODIUM SERPL-SCNC: 135 MMOL/L (ref 136–145)
SODIUM SERPL-SCNC: 136 MMOL/L (ref 136–145)
WBC # BLD AUTO: 8.38 K/UL (ref 3.9–12.7)

## 2024-11-29 PROCEDURE — 80048 BASIC METABOLIC PNL TOTAL CA: CPT

## 2024-11-29 PROCEDURE — 63600175 PHARM REV CODE 636 W HCPCS

## 2024-11-29 PROCEDURE — 80053 COMPREHEN METABOLIC PANEL: CPT

## 2024-11-29 PROCEDURE — 94799 UNLISTED PULMONARY SVC/PX: CPT

## 2024-11-29 PROCEDURE — 25000242 PHARM REV CODE 250 ALT 637 W/ HCPCS

## 2024-11-29 PROCEDURE — 94640 AIRWAY INHALATION TREATMENT: CPT

## 2024-11-29 PROCEDURE — 36415 COLL VENOUS BLD VENIPUNCTURE: CPT

## 2024-11-29 PROCEDURE — 85025 COMPLETE CBC W/AUTO DIFF WBC: CPT

## 2024-11-29 PROCEDURE — 20600001 HC STEP DOWN PRIVATE ROOM

## 2024-11-29 PROCEDURE — 25000003 PHARM REV CODE 250

## 2024-11-29 PROCEDURE — 97161 PT EVAL LOW COMPLEX 20 MIN: CPT

## 2024-11-29 PROCEDURE — 94761 N-INVAS EAR/PLS OXIMETRY MLT: CPT

## 2024-11-29 RX ADMIN — SENNOSIDES AND DOCUSATE SODIUM 1 TABLET: 50; 8.6 TABLET ORAL at 08:11

## 2024-11-29 RX ADMIN — POLYETHYLENE GLYCOL 3350 17 G: 17 POWDER, FOR SOLUTION ORAL at 08:11

## 2024-11-29 RX ADMIN — OXYCODONE 5 MG: 5 TABLET ORAL at 01:11

## 2024-11-29 RX ADMIN — ENOXAPARIN SODIUM 40 MG: 40 INJECTION SUBCUTANEOUS at 04:11

## 2024-11-29 RX ADMIN — OXYCODONE 5 MG: 5 TABLET ORAL at 11:11

## 2024-11-29 RX ADMIN — IPRATROPIUM BROMIDE AND ALBUTEROL SULFATE 3 ML: 2.5; .5 SOLUTION RESPIRATORY (INHALATION) at 07:11

## 2024-11-29 RX ADMIN — IPRATROPIUM BROMIDE AND ALBUTEROL SULFATE 3 ML: 2.5; .5 SOLUTION RESPIRATORY (INHALATION) at 01:11

## 2024-11-29 RX ADMIN — OXYCODONE 5 MG: 5 TABLET ORAL at 06:11

## 2024-11-29 RX ADMIN — OXYCODONE 5 MG: 5 TABLET ORAL at 08:11

## 2024-11-29 RX ADMIN — GABAPENTIN 300 MG: 300 CAPSULE ORAL at 08:11

## 2024-11-29 RX ADMIN — OXYCODONE 5 MG: 5 TABLET ORAL at 03:11

## 2024-11-29 RX ADMIN — IPRATROPIUM BROMIDE AND ALBUTEROL SULFATE 3 ML: 2.5; .5 SOLUTION RESPIRATORY (INHALATION) at 08:11

## 2024-11-29 NOTE — ASSESSMENT & PLAN NOTE
Pt is a 63 yo female with recurrent spontaneous pneumothorax.      - status post VATS with chemical and mechanical pleurodesis on 11/27/2024.  - doing well this morning, CXR looks good today, will keep to water seal today, likely pull tube and discharge tomorrow.   - okay for regular diet  - Please call or page with any questions

## 2024-11-29 NOTE — SUBJECTIVE & OBJECTIVE
Interval History:  No acute events overnight.  Feels well on room air currently.  No air leak on suction.  CXR looks good, loni expanded    Medications:  Continuous Infusions:  Scheduled Meds:   albuterol-ipratropium  3 mL Nebulization Q6H WAKE    enoxparin  40 mg Subcutaneous Daily    gabapentin  300 mg Oral QHS    polyethylene glycol  17 g Oral Daily    senna-docusate 8.6-50 mg  1 tablet Oral BID     PRN Meds:  Current Facility-Administered Medications:     acetaminophen, 650 mg, Oral, Q6H PRN    bisacodyL, 10 mg, Rectal, Daily PRN    dextrose 10%, 12.5 g, Intravenous, PRN    dextrose 10%, 25 g, Intravenous, PRN    glucagon (human recombinant), 1 mg, Intramuscular, PRN    glucose, 16 g, Oral, PRN    glucose, 24 g, Oral, PRN    naloxone, 0.02 mg, Intravenous, PRN    ondansetron, 8 mg, Oral, Q8H PRN    oxyCODONE, 5 mg, Oral, Q4H PRN    oxyCODONE, 10 mg, Oral, Q4H PRN    sodium chloride 0.9%, 10 mL, Intravenous, Q12H PRN     Review of patient's allergies indicates:  No Known Allergies  Objective:     Vital Signs (Most Recent):  Temp: 98.8 °F (37.1 °C) (11/29/24 0747)  Pulse: 74 (11/29/24 0747)  Resp: 18 (11/29/24 0747)  BP: 113/64 (11/29/24 0747)  SpO2: 97 % (11/29/24 0747) Vital Signs (24h Range):  Temp:  [97.4 °F (36.3 °C)-98.8 °F (37.1 °C)] 98.8 °F (37.1 °C)  Pulse:  [60-88] 74  Resp:  [15-20] 18  SpO2:  [88 %-97 %] 97 %  BP: (105-119)/(62-76) 113/64     Intake/Output - Last 3 Shifts         11/27 0700 11/28 0659 11/28 0700 11/29 0659 11/29 0700 11/30 0659    P.O. 240 120     IV Piggyback 400      Total Intake(mL/kg) 640 (12.3) 120 (2.3)     Urine (mL/kg/hr) 600 (0.5) 1200 (1)     Emesis/NG output 0 0     Other 0 0     Stool 0 0     Blood 0 0     Chest Tube 162 138     Total Output 762 1338     Net -122 -1218            Urine Occurrence  2 x     Stool Occurrence 0 x 0 x             SpO2: 97 %        Physical Exam  Constitutional:       General: She is not in acute distress.     Appearance: Normal appearance. She  is not diaphoretic.   HENT:      Head: Normocephalic and atraumatic.   Eyes:      Pupils: Pupils are equal, round, and reactive to light.   Cardiovascular:      Rate and Rhythm: Normal rate and regular rhythm.   Pulmonary:      Effort: Pulmonary effort is normal. No respiratory distress.      Comments: Normal work of breathing.  No air leak, on suction, placed to water seal this morning  Abdominal:      General: There is no distension.      Palpations: Abdomen is soft.   Skin:     General: Skin is warm and dry.   Neurological:      General: No focal deficit present.      Mental Status: She is alert and oriented to person, place, and time.   Psychiatric:         Mood and Affect: Mood normal.         Behavior: Behavior normal.         Thought Content: Thought content normal.         Judgment: Judgment normal.            Significant Labs:  CBC:   Recent Labs   Lab 11/29/24  0402   WBC 8.38   RBC 3.87*   HGB 12.1   HCT 36.6*      MCV 95   MCH 31.3*   MCHC 33.1     CMP:   Recent Labs   Lab 11/29/24  0402   GLU 86  86   CALCIUM 8.6*  8.7   ALBUMIN 3.1*   PROT 5.7*   *  136   K 4.6  4.7   CO2 23  23     101   BUN 20  19   CREATININE 0.6  0.6   ALKPHOS 75   ALT 11   AST 20   BILITOT 0.5       Significant Diagnostics:  I have reviewed all pertinent imaging results/findings within the past 24 hours.    VTE Risk Mitigation (From admission, onward)           Ordered     enoxaparin injection 40 mg  Daily         11/27/24 1413     IP VTE HIGH RISK PATIENT  Once         11/27/24 1413     Place WEI hose  Until discontinued         11/27/24 1413     Place sequential compression device  Until discontinued         11/27/24 1413     Place sequential compression device  Until discontinued         11/27/24 0252

## 2024-11-29 NOTE — PROGRESS NOTES
[facility]  Thoracic Surgery  Progress Note    Subjective:     History of Present Illness:  No notes on file    Post-Op Info:  Procedure(s) (LRB):  VATS, WITH PLEURODESIS mechanical and chemical pleruodesis (Left)  BLOCK, NERVE, INTERCOSTAL, 2 OR MORE   2 Days Post-Op     Interval History:  No acute events overnight.  Feels well on room air currently.  No air leak on suction.  CXR looks good, loni expanded    Medications:  Continuous Infusions:  Scheduled Meds:   albuterol-ipratropium  3 mL Nebulization Q6H WAKE    enoxparin  40 mg Subcutaneous Daily    gabapentin  300 mg Oral QHS    polyethylene glycol  17 g Oral Daily    senna-docusate 8.6-50 mg  1 tablet Oral BID     PRN Meds:  Current Facility-Administered Medications:     acetaminophen, 650 mg, Oral, Q6H PRN    bisacodyL, 10 mg, Rectal, Daily PRN    dextrose 10%, 12.5 g, Intravenous, PRN    dextrose 10%, 25 g, Intravenous, PRN    glucagon (human recombinant), 1 mg, Intramuscular, PRN    glucose, 16 g, Oral, PRN    glucose, 24 g, Oral, PRN    naloxone, 0.02 mg, Intravenous, PRN    ondansetron, 8 mg, Oral, Q8H PRN    oxyCODONE, 5 mg, Oral, Q4H PRN    oxyCODONE, 10 mg, Oral, Q4H PRN    sodium chloride 0.9%, 10 mL, Intravenous, Q12H PRN     Review of patient's allergies indicates:  No Known Allergies  Objective:     Vital Signs (Most Recent):  Temp: 98.8 °F (37.1 °C) (11/29/24 0747)  Pulse: 74 (11/29/24 0747)  Resp: 18 (11/29/24 0747)  BP: 113/64 (11/29/24 0747)  SpO2: 97 % (11/29/24 0747) Vital Signs (24h Range):  Temp:  [97.4 °F (36.3 °C)-98.8 °F (37.1 °C)] 98.8 °F (37.1 °C)  Pulse:  [60-88] 74  Resp:  [15-20] 18  SpO2:  [88 %-97 %] 97 %  BP: (105-119)/(62-76) 113/64     Intake/Output - Last 3 Shifts         11/27 0700  11/28 0659 11/28 0700  11/29 0659 11/29 0700  11/30 0659    P.O. 240 120     IV Piggyback 400      Total Intake(mL/kg) 640 (12.3) 120 (2.3)     Urine (mL/kg/hr) 600 (0.5) 1200 (1)     Emesis/NG output 0 0     Other 0 0     Stool 0 0     Blood  0 0     Chest Tube 162 138     Total Output 762 1338     Net -122 -1218            Urine Occurrence  2 x     Stool Occurrence 0 x 0 x             SpO2: 97 %        Physical Exam  Constitutional:       General: She is not in acute distress.     Appearance: Normal appearance. She is not diaphoretic.   HENT:      Head: Normocephalic and atraumatic.   Eyes:      Pupils: Pupils are equal, round, and reactive to light.   Cardiovascular:      Rate and Rhythm: Normal rate and regular rhythm.   Pulmonary:      Effort: Pulmonary effort is normal. No respiratory distress.      Comments: Normal work of breathing.  No air leak, on suction, placed to water seal this morning  Abdominal:      General: There is no distension.      Palpations: Abdomen is soft.   Skin:     General: Skin is warm and dry.   Neurological:      General: No focal deficit present.      Mental Status: She is alert and oriented to person, place, and time.   Psychiatric:         Mood and Affect: Mood normal.         Behavior: Behavior normal.         Thought Content: Thought content normal.         Judgment: Judgment normal.            Significant Labs:  CBC:   Recent Labs   Lab 11/29/24  0402   WBC 8.38   RBC 3.87*   HGB 12.1   HCT 36.6*      MCV 95   MCH 31.3*   MCHC 33.1     CMP:   Recent Labs   Lab 11/29/24  0402   GLU 86  86   CALCIUM 8.6*  8.7   ALBUMIN 3.1*   PROT 5.7*   *  136   K 4.6  4.7   CO2 23  23     101   BUN 20  19   CREATININE 0.6  0.6   ALKPHOS 75   ALT 11   AST 20   BILITOT 0.5       Significant Diagnostics:  I have reviewed all pertinent imaging results/findings within the past 24 hours.    VTE Risk Mitigation (From admission, onward)           Ordered     enoxaparin injection 40 mg  Daily         11/27/24 1413     IP VTE HIGH RISK PATIENT  Once         11/27/24 1413     Place WEI hose  Until discontinued         11/27/24 1413     Place sequential compression device  Until discontinued         11/27/24 1413      Place sequential compression device  Until discontinued         11/27/24 0252                  Assessment/Plan:     * Pneumothorax  Pt is a 63 yo female with recurrent spontaneous pneumothorax.      - status post VATS with chemical and mechanical pleurodesis on 11/27/2024.  - doing well this morning, CXR looks good today, will keep to water seal today, likely pull tube and discharge tomorrow.   - okay for regular diet  - Please call or page with any questions        Baldev Tom MD  Thoracic Surgery  [facility]

## 2024-11-29 NOTE — PLAN OF CARE
Problem: Physical Therapy  Goal: Physical Therapy Goal  Description: Goals to be met by: 2024     Patient will increase functional independence with mobility by performin. Supine to sit with [place]  2. Sit to supine with [place]  3. Sit to stand transfer with Supervision  4. Bed to chair transfer with Supervision using No Assistive Device  5. Gait  x 200 feet with Supervision using No Assistive Device.   6. Lower extremity exercise program x10 reps per handout, with supervision  Outcome: Progressing

## 2024-11-29 NOTE — PLAN OF CARE
11/29/24 1136   Discharge Reassessment   Assessment Type Discharge Planning Reassessment   Did the patient's condition or plan change since previous assessment? No   Discharge Plan discussed with: Spouse/sig other;Patient   Communicated JOSE ELIAS with patient/caregiver Yes   Discharge Plan A Home with family   Discharge Plan B Home   DME Needed Upon Discharge  none   Transition of Care Barriers None   Why the patient remains in the hospital Requires continued medical care   Post-Acute Status   Post-Acute Authorization Other   Other Status No Post-Acute Service Needs   Hospital Resources/Appts/Education Provided Appointments scheduled and added to AVS   Discharge Delays None known at this time

## 2024-11-29 NOTE — PT/OT/SLP EVAL
Physical Therapy Evaluation    Patient Name:  Christin Luna   MRN:  [id number]    Recommendations:     Discharge Recommendations: No Therapy Indicated   Discharge Equipment Recommendations: none   Barriers to discharge: None    Assessment:     Christin Luna is a 64 y.o. female admitted with a medical diagnosis of Pneumothorax.  She presents with the following impairments/functional limitations: impaired balance, gait instability, impaired cardiopulmonary response to activity. Pt reporting mild lightheadedness/dizziness while ambulating and returned to chair in room. Pt O2 reading 89% when seated, but quickly returned to 92%.     Rehab Prognosis: Good; patient would benefit from acute skilled PT services to address these deficits and reach maximum level of function.    Recent Surgery: Procedure(s) (LRB):  VATS, WITH PLEURODESIS mechanical and chemical pleruodesis (Left)  BLOCK, NERVE, INTERCOSTAL, 2 OR MORE 2 Days Post-Op    Plan:     During this hospitalization, patient to be seen 3 x/week to address the identified rehab impairments via gait training, therapeutic activities, therapeutic exercises, neuromuscular re-education and progress toward the following goals:    Plan of Care Expires:  12/29/24    Subjective     Chief Complaint: pain at chest tube site  Patient/Family Comments/goals: return to PLOF  Pain/Comfort:  Pain Rating 1: 2/10  Location 1:  (chest tube site)  Pain Addressed 1: Distraction  Pain Rating Post-Intervention 1:  (not rated)    Patients cultural, spiritual, Moravian conflicts given the current situation: no    Living Environment:  Living Environment: Lives with her spouse and daughter in a [facility] with threshold step to enter. Has a walk in shower.   Previous level of function: Independent with ADLs and iADLs. 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 Pt is an active .  Equipment Used at Home: none  Assistance upon Discharge: family    Objective:     Communicated with RN prior to session.   Patient found up in chair with pulse ox (continuous), telemetry, chest tube  upon PT entry to room.    General Precautions: Standard, fall  Orthopedic Precautions:N/A   Braces: N/A  Respiratory Status: Room air    Exams:  Cognitive Exam:  Patient is oriented to Person, Place, Time, and Situation  Gross Motor Coordination:  WFL  Postural Exam:  Patient presented with the following abnormalities:    -       No postural abnormalities identified  Sensation:    -       Intact, BLEs  RLE ROM: WFL  RLE Strength: hip flexion 4/5, knee extension 5/5, knee flexion 4/5, DF 5/5  LLE ROM: WFL  LLE Strength: hip flexion 4/5, knee extension 5/5, knee flexion 4/5, DF 5/5    Functional Mobility:  Transfers:     Sit to Stand:  supervision with no AD  Gait: 92 ft, CGA, no AD; 1 episode of unsteady and minor dizziness/lightheadedness with turning and pt returned to room, no other LOBs noted or gait deviations noted      AM-PAC 6 CLICK MOBILITY  Total Score:18       Treatment & Education:  Patient educated on role of therapy, goals of session, and benefits of mobilizing.   Discussed PT plan of care during hospitalization.   Patient educated on calling for assistance.   Patient educated on how their diagnosis impacts their mobility within PT scope of practice.   All questions answered within PT scope of practice.    Patient left up in chair with all lines intact, call button in reach, pt's daughter present and RN notified.    GOALS:   Multidisciplinary Problems       Physical Therapy Goals          Problem: Physical Therapy    Goal Priority Disciplines Outcome Interventions   Physical Therapy Goal     PT, PT/OT Progressing    Description: Goals to be met by: 2024     Patient will increase functional independence with mobility by performin. Supine to sit with [place]  2. Sit to supine with [place]  3. Sit to stand transfer with Supervision  4. Bed to chair transfer with Supervision using No Assistive Device  5. Gait   x 200 feet with Supervision using No Assistive Device.   6. Lower extremity exercise program x10 reps per handout, with supervision                       History:     History reviewed. No pertinent past medical history.    Past Surgical History:   Procedure Laterality Date    INJECTION OF ANESTHETIC AGENT AROUND MULTIPLE INTERCOSTAL NERVES  11/27/2024    Procedure: BLOCK, NERVE, INTERCOSTAL, 2 OR MORE;  Surgeon: Farshad Valdovinos MD;  [place] [facility] [place] [address] [place] [address];  Service: Cardiothoracic;;    PLEURODESIS WITH VIDEO-ASSISTED THORACOSCOPIC SURGERY (VATS) Left 11/27/2024    Procedure: VATS, WITH PLEURODESIS mechanical and chemical pleruodesis;  Surgeon: Farshad Valdovinos MD;  [place] [facility] [address] [place] [address];  Service: Cardiothoracic;  Laterality: Left;       Time Tracking:     PT Received On: 11/29/24  PT Start Time: 1152     PT Stop Time: 1204  PT Total Time (min): 12 min     Billable Minutes: Evaluation 12      11/29/2024

## 2024-11-29 NOTE — PROGRESS NOTES
[facility] Medicine  Progress Note    Patient Name: Christin Luna  MRN: [id number]  Patient Class: IP- Inpatient   Admission Date: 11/26/2024  Length of Stay: 3 days  Attending Physician: Kenya Rangel MD  Primary Care Provider: Arthur Bauman MD        Subjective:     Principal Problem:Pneumothorax        HPI:      Overview/Hospital Course:  11/27 Underwent pleurodesis today.    Interval History:   11/29: Doing well off oxygen    Review of Systems  Objective:     Vital Signs (Most Recent):  Temp: 98 °F (36.7 °C) (11/29/24 1129)  Pulse: 77 (11/29/24 1129)  Resp: 18 (11/29/24 1129)  BP: (!) 105/58 (11/29/24 1129)  SpO2: (!) 90 % (11/29/24 1129) Vital Signs (24h Range):  Temp:  [97.4 °F (36.3 °C)-98.8 °F (37.1 °C)] 98 °F (36.7 °C)  Pulse:  [60-88] 77  Resp:  [15-20] 18  SpO2:  [89 %-97 %] 90 %  BP: (105-119)/(58-76) 105/58     Weight: 52 kg (114 lb 12 oz)  Body mass index is 20.33 kg/m².    Intake/Output Summary (Last 24 hours) at 11/29/2024 1140  Last data filed at 11/29/2024 0628  Gross per 24 hour   Intake --   Output 1338 ml   Net -1338 ml         Physical Exam  Constitutional:       General: She is not in acute distress.     Appearance: Normal appearance. She is not diaphoretic.   HENT:      Head: Normocephalic and atraumatic.   Eyes:      Pupils: Pupils are equal, round, and reactive to light.   Cardiovascular:      Rate and Rhythm: Normal rate and regular rhythm.   Pulmonary:      Effort: Pulmonary effort is normal. No respiratory distress.      Comments: Normal work of breathing.  Left-sided chest tube in place with intermittent air leak.  To suction  Abdominal:      General: There is no distension.      Palpations: Abdomen is soft.   Skin:     General: Skin is warm and dry.   Neurological:      General: No focal deficit present.      Mental Status: She is alert and oriented to person, place, and time.   Psychiatric:         Mood and Affect: Mood normal.         Behavior: Behavior  normal.         Thought Content: Thought content normal.         Judgment: Judgment normal.             Significant Labs: All pertinent labs within the past 24 hours have been reviewed.    Significant Imaging: I have reviewed all pertinent imaging results/findings within the past 24 hours.    Assessment/Plan:      * Pneumothorax  Pt is a 65 yo female with recurrent spontaneous pneumothorax.      OR today for VATS with pleurodesis  - Risks and benefits of continued conservative management versus VATS with pleurodesis discussed with patient. Informed consent obtained.   - NPO for procedure  Incentive spirometry after        Centrilobular emphysema  Patient's COPD is controlled currently.  Patient is currently off COPD Pathway. Continue scheduled inhalers Steroids, Antibiotics, and Supplemental oxygen and monitor respiratory status closely.       VTE Risk Mitigation (From admission, onward)           Ordered     enoxaparin injection 40 mg  Daily         11/27/24 1413     IP VTE HIGH RISK PATIENT  Once         11/27/24 1413     Place WEI hose  Until discontinued         11/27/24 1413     Place sequential compression device  Until discontinued         11/27/24 1413     Place sequential compression device  Until discontinued         11/27/24 0252                    Discharge Planning   JOSE ELIAS: 11/30/2024     Code Status: Full Code   Is the patient medically ready for discharge?:     Reason for patient still in hospital (select all that apply): Patient trending condition  Discharge Plan A: Home with family   Discharge Delays: None known at this time              Kenya Rangel MD  Department of Hospital Medicine   [facility]  [facility]

## 2024-11-29 NOTE — SUBJECTIVE & OBJECTIVE
Interval History:   11/29: Doing well off oxygen    Review of Systems  Objective:     Vital Signs (Most Recent):  Temp: 98 °F (36.7 °C) (11/29/24 1129)  Pulse: 77 (11/29/24 1129)  Resp: 18 (11/29/24 1129)  BP: (!) 105/58 (11/29/24 1129)  SpO2: (!) 90 % (11/29/24 1129) Vital Signs (24h Range):  Temp:  [97.4 °F (36.3 °C)-98.8 °F (37.1 °C)] 98 °F (36.7 °C)  Pulse:  [60-88] 77  Resp:  [15-20] 18  SpO2:  [89 %-97 %] 90 %  BP: (105-119)/(58-76) 105/58     Weight: 52 kg (114 lb 12 oz)  Body mass index is 20.33 kg/m².    Intake/Output Summary (Last 24 hours) at 11/29/2024 1140  Last data filed at 11/29/2024 0628  Gross per 24 hour   Intake --   Output 1338 ml   Net -1338 ml         Physical Exam  Constitutional:       General: She is not in acute distress.     Appearance: Normal appearance. She is not diaphoretic.   HENT:      Head: Normocephalic and atraumatic.   Eyes:      Pupils: Pupils are equal, round, and reactive to light.   Cardiovascular:      Rate and Rhythm: Normal rate and regular rhythm.   Pulmonary:      Effort: Pulmonary effort is normal. No respiratory distress.      Comments: Normal work of breathing.  Left-sided chest tube in place with intermittent air leak.  To suction  Abdominal:      General: There is no distension.      Palpations: Abdomen is soft.   Skin:     General: Skin is warm and dry.   Neurological:      General: No focal deficit present.      Mental Status: She is alert and oriented to person, place, and time.   Psychiatric:         Mood and Affect: Mood normal.         Behavior: Behavior normal.         Thought Content: Thought content normal.         Judgment: Judgment normal.             Significant Labs: All pertinent labs within the past 24 hours have been reviewed.    Significant Imaging: I have reviewed all pertinent imaging results/findings within the past 24 hours.

## 2024-11-29 NOTE — PLAN OF CARE
11/29/24 1136   Post-Acute Status   Post-Acute Authorization Other   Other Status No Post-Acute Service Needs   Hospital Resources/Appts/Education Provided Appointments scheduled and added to AVS   Discharge Delays None known at this time   Discharge Plan   Discharge Plan A Home with family   Discharge Plan B Home

## 2024-11-29 NOTE — PROGRESS NOTES
"[facility] Plan of Care Note    Dx:   Pneumothorax [J93.9]    Procedure(s) (LRB):  VATS, WITH PLEURODESIS mechanical and chemical pleruodesis (Left) - 11/27    Shift Events: no significant event. Pain better controlled    Goals of Care: Pain control, Incentive spirometry as ordered,     Neuro: AAOx4    Vital Signs: /76   Pulse 64   Temp 98.3 °F (36.8 °C) (Oral)   Resp 16   Ht 5' 3" (1.6 m)   Wt 52 kg (114 lb 12 oz)   LMP  (LMP Unknown)   SpO2 95%   Breastfeeding No   BMI 20.33 kg/m²     Respiratory: 2L NC    Diet: Diet Adult Regular    Is patient tolerating current diet? yes    GTTS: none    Urine Output/Bowel Movement:   I/O this shift:  In: -   Out: 500 [Urine:500]  Last Bowel Movement: 11/24/24      Drains/Tubes/Tube Feeds (include total output/shift):   I/O this shift:  In: -   Out: 500 [Urine:500]  Lines: PVI RAC #20; #20 R Hand; LFA #20     Accuchecks: no     Skin: surgical incision     Fall Risk Score: 5     Activity level? Stand by     Any scheduled procedures? none     Any safety concerns? Bleeding  "

## 2024-11-30 VITALS
TEMPERATURE: 98 F | OXYGEN SATURATION: 98 % | SYSTOLIC BLOOD PRESSURE: 118 MMHG | HEIGHT: 63 IN | RESPIRATION RATE: 17 BRPM | WEIGHT: 114.75 LBS | BODY MASS INDEX: 20.33 KG/M2 | DIASTOLIC BLOOD PRESSURE: 69 MMHG | HEART RATE: 67 BPM

## 2024-11-30 LAB
ALBUMIN SERPL BCP-MCNC: 3 G/DL (ref 3.5–5.2)
ALP SERPL-CCNC: 74 U/L (ref 40–150)
ALT SERPL W/O P-5'-P-CCNC: 11 U/L (ref 10–44)
ANION GAP SERPL CALC-SCNC: 4 MMOL/L (ref 8–16)
ANION GAP SERPL CALC-SCNC: 7 MMOL/L (ref 8–16)
AST SERPL-CCNC: 18 U/L (ref 10–40)
BASOPHILS # BLD AUTO: 0.04 K/UL (ref 0–0.2)
BASOPHILS NFR BLD: 0.6 % (ref 0–1.9)
BILIRUB SERPL-MCNC: 0.4 MG/DL (ref 0.1–1)
BUN SERPL-MCNC: 15 MG/DL (ref 8–23)
BUN SERPL-MCNC: 16 MG/DL (ref 8–23)
CALCIUM SERPL-MCNC: 8.6 MG/DL (ref 8.7–10.5)
CALCIUM SERPL-MCNC: 8.8 MG/DL (ref 8.7–10.5)
CHLORIDE SERPL-SCNC: 105 MMOL/L (ref 95–110)
CHLORIDE SERPL-SCNC: 106 MMOL/L (ref 95–110)
CO2 SERPL-SCNC: 25 MMOL/L (ref 23–29)
CO2 SERPL-SCNC: 27 MMOL/L (ref 23–29)
CREAT SERPL-MCNC: 0.6 MG/DL (ref 0.5–1.4)
CREAT SERPL-MCNC: 0.6 MG/DL (ref 0.5–1.4)
DIFFERENTIAL METHOD BLD: ABNORMAL
EOSINOPHIL # BLD AUTO: 0.4 K/UL (ref 0–0.5)
EOSINOPHIL NFR BLD: 5.5 % (ref 0–8)
ERYTHROCYTE [DISTWIDTH] IN BLOOD BY AUTOMATED COUNT: 12.9 % (ref 11.5–14.5)
EST. GFR  (NO RACE VARIABLE): >60 ML/MIN/1.73 M^2
EST. GFR  (NO RACE VARIABLE): >60 ML/MIN/1.73 M^2
GLUCOSE SERPL-MCNC: 85 MG/DL (ref 70–110)
GLUCOSE SERPL-MCNC: 87 MG/DL (ref 70–110)
HCT VFR BLD AUTO: 33.9 % (ref 37–48.5)
HGB BLD-MCNC: 11.7 G/DL (ref 12–16)
IMM GRANULOCYTES # BLD AUTO: 0.02 K/UL (ref 0–0.04)
IMM GRANULOCYTES NFR BLD AUTO: 0.3 % (ref 0–0.5)
LYMPHOCYTES # BLD AUTO: 3 K/UL (ref 1–4.8)
LYMPHOCYTES NFR BLD: 46.8 % (ref 18–48)
MCH RBC QN AUTO: 32.5 PG (ref 27–31)
MCHC RBC AUTO-ENTMCNC: 34.5 G/DL (ref 32–36)
MCV RBC AUTO: 94 FL (ref 82–98)
MONOCYTES # BLD AUTO: 0.6 K/UL (ref 0.3–1)
MONOCYTES NFR BLD: 9.3 % (ref 4–15)
NEUTROPHILS # BLD AUTO: 2.4 K/UL (ref 1.8–7.7)
NEUTROPHILS NFR BLD: 37.5 % (ref 38–73)
NRBC BLD-RTO: 0 /100 WBC
PLATELET # BLD AUTO: 216 K/UL (ref 150–450)
PMV BLD AUTO: 11.4 FL (ref 9.2–12.9)
POTASSIUM SERPL-SCNC: 4 MMOL/L (ref 3.5–5.1)
POTASSIUM SERPL-SCNC: 4.1 MMOL/L (ref 3.5–5.1)
PROT SERPL-MCNC: 5.4 G/DL (ref 6–8.4)
RBC # BLD AUTO: 3.6 M/UL (ref 4–5.4)
SODIUM SERPL-SCNC: 137 MMOL/L (ref 136–145)
SODIUM SERPL-SCNC: 137 MMOL/L (ref 136–145)
WBC # BLD AUTO: 6.34 K/UL (ref 3.9–12.7)

## 2024-11-30 PROCEDURE — 94761 N-INVAS EAR/PLS OXIMETRY MLT: CPT

## 2024-11-30 PROCEDURE — 80053 COMPREHEN METABOLIC PANEL: CPT

## 2024-11-30 PROCEDURE — 36415 COLL VENOUS BLD VENIPUNCTURE: CPT

## 2024-11-30 PROCEDURE — 94640 AIRWAY INHALATION TREATMENT: CPT

## 2024-11-30 PROCEDURE — 25000242 PHARM REV CODE 250 ALT 637 W/ HCPCS

## 2024-11-30 PROCEDURE — 25000003 PHARM REV CODE 250: Performed by: HOSPITALIST

## 2024-11-30 PROCEDURE — 99900035 HC TECH TIME PER 15 MIN (STAT)

## 2024-11-30 PROCEDURE — 80048 BASIC METABOLIC PNL TOTAL CA: CPT

## 2024-11-30 PROCEDURE — 85025 COMPLETE CBC W/AUTO DIFF WBC: CPT

## 2024-11-30 PROCEDURE — 25000003 PHARM REV CODE 250

## 2024-11-30 RX ORDER — GABAPENTIN 300 MG/1
300 CAPSULE ORAL NIGHTLY
Qty: 30 CAPSULE | Refills: 0 | Status: SHIPPED | OUTPATIENT
Start: 2024-11-30 | End: 2024-12-30

## 2024-11-30 RX ORDER — BISACODYL 10 MG/1
10 SUPPOSITORY RECTAL ONCE
Status: COMPLETED | OUTPATIENT
Start: 2024-11-30 | End: 2024-11-30

## 2024-11-30 RX ORDER — OXYCODONE HYDROCHLORIDE 10 MG/1
10 TABLET ORAL EVERY 4 HOURS PRN
Qty: 30 TABLET | Refills: 0 | Status: SHIPPED | OUTPATIENT
Start: 2024-11-30

## 2024-11-30 RX ADMIN — SENNOSIDES AND DOCUSATE SODIUM 1 TABLET: 50; 8.6 TABLET ORAL at 09:11

## 2024-11-30 RX ADMIN — IPRATROPIUM BROMIDE AND ALBUTEROL SULFATE 3 ML: 2.5; .5 SOLUTION RESPIRATORY (INHALATION) at 12:11

## 2024-11-30 RX ADMIN — IPRATROPIUM BROMIDE AND ALBUTEROL SULFATE 3 ML: 2.5; .5 SOLUTION RESPIRATORY (INHALATION) at 08:11

## 2024-11-30 RX ADMIN — BISACODYL 10 MG: 10 SUPPOSITORY RECTAL at 12:11

## 2024-11-30 RX ADMIN — POLYETHYLENE GLYCOL 3350 17 G: 17 POWDER, FOR SOLUTION ORAL at 09:11

## 2024-11-30 RX ADMIN — OXYCODONE HYDROCHLORIDE 10 MG: 10 TABLET ORAL at 06:11

## 2024-11-30 NOTE — PLAN OF CARE
11/30/24 1415   Post-Acute Status   Post-Acute Authorization Other   Other Status No Post-Acute Service Needs   Discharge Delays None known at this time   Discharge Plan   Discharge Plan A Home with family;Home   Discharge Plan B Home with family     Patient cleared for discharge from case management standpoint.      Chart and discharge orders reviewed.  Patient discharged home with no further case management needs.          Riddhi Wen, LAMONT   - [facility]

## 2024-11-30 NOTE — PROGRESS NOTES
"[facility] Plan of Care Note    Dx:   Pneumothorax [J93.9]    Shift Events: No significant event overnight    Goals of Care: pain control, IS usage    Neuro: AAOx4    Vital Signs: /67 (BP Location: Right arm, Patient Position: Sitting)   Pulse 61   Temp 98.6 °F (37 °C)   Resp 16   Ht 5' 3" (1.6 m)   Wt 52 kg (114 lb 12 oz)   LMP  (LMP Unknown)   SpO2 96%   Breastfeeding No   BMI 20.33 kg/m²     Respiratory: RA    Diet: Diet Adult Regular    Is patient tolerating current diet? yes    GTTS: none    Urine Output/Bowel Movement:   No intake/output data recorded.  Last Bowel Movement: 11/24/24      Drains/Tubes/Tube Feeds (include total output/shift):   No intake/output data recorded.  "

## 2024-11-30 NOTE — SUBJECTIVE & OBJECTIVE
Interval History:  No acute events overnight.  Chest tube appears to have withdrawn slightly, the black dot is visible there is no air in training likely around the tube as this is an open system.  Chest tube removed.  CXR reviewed, lung appears expanded    Medications:  Continuous Infusions:  Scheduled Meds:   albuterol-ipratropium  3 mL Nebulization Q6H WAKE    enoxparin  40 mg Subcutaneous Daily    gabapentin  300 mg Oral QHS    polyethylene glycol  17 g Oral Daily    senna-docusate 8.6-50 mg  1 tablet Oral BID     PRN Meds:  Current Facility-Administered Medications:     acetaminophen, 650 mg, Oral, Q6H PRN    bisacodyL, 10 mg, Rectal, Daily PRN    dextrose 10%, 12.5 g, Intravenous, PRN    dextrose 10%, 25 g, Intravenous, PRN    glucagon (human recombinant), 1 mg, Intramuscular, PRN    glucose, 16 g, Oral, PRN    glucose, 24 g, Oral, PRN    naloxone, 0.02 mg, Intravenous, PRN    ondansetron, 8 mg, Oral, Q8H PRN    oxyCODONE, 5 mg, Oral, Q4H PRN    oxyCODONE, 10 mg, Oral, Q4H PRN    sodium chloride 0.9%, 10 mL, Intravenous, Q12H PRN     Review of patient's allergies indicates:  No Known Allergies  Objective:     Vital Signs (Most Recent):  Temp: 98 °F (36.7 °C) (11/30/24 0756)  Pulse: 65 (11/30/24 0837)  Resp: 17 (11/30/24 0837)  BP: 117/62 (11/30/24 0756)  SpO2: 95 % (11/30/24 0837) Vital Signs (24h Range):  Temp:  [97.9 °F (36.6 °C)-98.6 °F (37 °C)] 98 °F (36.7 °C)  Pulse:  [61-77] 65  Resp:  [16-20] 17  SpO2:  [89 %-96 %] 95 %  BP: (105-118)/(55-67) 117/62     Intake/Output - Last 3 Shifts         11/28 0700  11/29 0659 11/29 0700  11/30 0659 11/30 0700  12/01 0659    P.O. 120 1190     I.V. (mL/kg)  0 (0)     Other  0     IV Piggyback       Total Intake(mL/kg) 120 (2.3) 1190 (22.9)     Urine (mL/kg/hr) 1200 (1) 0 (0)     Emesis/NG output 0 0     Other 0 0     Stool 0 0     Blood 0      Chest Tube 138 60     Total Output 1338 60     Net -1218 +1130            Urine Occurrence 2 x 6 x     Stool Occurrence 0 x 0  x     Emesis Occurrence  0 x             SpO2: 95 %        Physical Exam  Constitutional:       General: She is not in acute distress.     Appearance: Normal appearance. She is not diaphoretic.   HENT:      Head: Normocephalic and atraumatic.   Eyes:      Pupils: Pupils are equal, round, and reactive to light.   Cardiovascular:      Rate and Rhythm: Normal rate and regular rhythm.   Pulmonary:      Effort: Pulmonary effort is normal. No respiratory distress.      Comments: Normal work of breathing.  Chest tube with black dot visible, removed  Abdominal:      General: There is no distension.      Palpations: Abdomen is soft.   Skin:     General: Skin is warm and dry.   Neurological:      General: No focal deficit present.      Mental Status: She is alert and oriented to person, place, and time.   Psychiatric:         Mood and Affect: Mood normal.         Behavior: Behavior normal.         Thought Content: Thought content normal.         Judgment: Judgment normal.            Significant Labs:  CBC:   Recent Labs   Lab 11/30/24  0241   WBC 6.34   RBC 3.60*   HGB 11.7*   HCT 33.9*      MCV 94   MCH 32.5*   MCHC 34.5     CMP:   Recent Labs   Lab 11/30/24  0241   GLU 87  85   CALCIUM 8.6*  8.8   ALBUMIN 3.0*   PROT 5.4*     137   K 4.1  4.0   CO2 25  27     106   BUN 15  16   CREATININE 0.6  0.6   ALKPHOS 74   ALT 11   AST 18   BILITOT 0.4       Significant Diagnostics:  I have reviewed all pertinent imaging results/findings within the past 24 hours.    VTE Risk Mitigation (From admission, onward)           Ordered     enoxaparin injection 40 mg  Daily         11/27/24 1413     IP VTE HIGH RISK PATIENT  Once         11/27/24 1413     Place WEI hose  Until discontinued         11/27/24 1413     Place sequential compression device  Until discontinued         11/27/24 1413     Place sequential compression device  Until discontinued         11/27/24 0252

## 2024-11-30 NOTE — ASSESSMENT & PLAN NOTE
Pt is a 63 yo female with recurrent spontaneous pneumothorax.      - status post VATS with chemical and mechanical pleurodesis on 11/27/2024.  - chest tube backed out, black dot visible.  Chest tube removed  - she feels well after 12:00 p.m., she can leave. no repeat chest x-ray needed  - discharge prescriptions and instructions completed, should just need discharge order  - Please call or page with any questions

## 2024-11-30 NOTE — PLAN OF CARE
[facility]  Discharge Final Note    Primary Care Provider: Arthur Bauman MD    Expected Discharge Date: 11/30/2024    Final Discharge Note (most recent)       Final Note - 11/30/24 1417          Final Note    Assessment Type Final Discharge Note     Anticipated Discharge Disposition Home or Self Care     What phone number can be called within the next 1-3 days to see how you are doing after discharge? [phone]        Post-Acute Status    Post-Acute Authorization Other     Other Status No Post-Acute Service Needs     Discharge Delays None known at this time                     Important Message from Medicare             Contact Info       Arthur Bauman MD   Specialty: Internal Medicine   Relationship: PCP - General    [address]  [address]  [facility] [address]   Phone: [phone]       Next Steps: Schedule an appointment as soon as possible for a visit    [facility] [address]   Specialty: Pulmonology    [address]  [place] [address]   Phone: [phone]       Next Steps: Schedule an appointment as soon as possible for a visit    Farshad Valdovinos MD   Specialty: Cardiothoracic Surgery    [address] [facility] [address]   Phone: [phone]       Next Steps: Follow up in 2 week(s)              Patient medically ready for discharge to home.  Family/patient aware of discharge.    Future Appointments   Date Time Provider Department Center   12/9/2024  1:00 PM Geovanna Quiroz MD [facility]   12/20/2024  8:15 AM [facility]   12/20/2024  8:45 AM Baldev Munoz MD [facility]   5/20/2025 10:15 AM Arthur Bauman MD [facility]         Riddhi Wen LMSW  - [facility]

## 2024-11-30 NOTE — PROGRESS NOTES
[facility]  Thoracic Surgery  Progress Note    Subjective:     History of Present Illness:  No notes on file    Post-Op Info:  Procedure(s) (LRB):  VATS, WITH PLEURODESIS mechanical and chemical pleruodesis (Left)  BLOCK, NERVE, INTERCOSTAL, 2 OR MORE   3 Days Post-Op     Interval History:  No acute events overnight.  Chest tube appears to have withdrawn slightly, the black dot is visible there is no air in training likely around the tube as this is an open system.  Chest tube removed.  CXR reviewed, lung appears expanded    Medications:  Continuous Infusions:  Scheduled Meds:   albuterol-ipratropium  3 mL Nebulization Q6H WAKE    enoxparin  40 mg Subcutaneous Daily    gabapentin  300 mg Oral QHS    polyethylene glycol  17 g Oral Daily    senna-docusate 8.6-50 mg  1 tablet Oral BID     PRN Meds:  Current Facility-Administered Medications:     acetaminophen, 650 mg, Oral, Q6H PRN    bisacodyL, 10 mg, Rectal, Daily PRN    dextrose 10%, 12.5 g, Intravenous, PRN    dextrose 10%, 25 g, Intravenous, PRN    glucagon (human recombinant), 1 mg, Intramuscular, PRN    glucose, 16 g, Oral, PRN    glucose, 24 g, Oral, PRN    naloxone, 0.02 mg, Intravenous, PRN    ondansetron, 8 mg, Oral, Q8H PRN    oxyCODONE, 5 mg, Oral, Q4H PRN    oxyCODONE, 10 mg, Oral, Q4H PRN    sodium chloride 0.9%, 10 mL, Intravenous, Q12H PRN     Review of patient's allergies indicates:  No Known Allergies  Objective:     Vital Signs (Most Recent):  Temp: 98 °F (36.7 °C) (11/30/24 0756)  Pulse: 65 (11/30/24 0837)  Resp: 17 (11/30/24 0837)  BP: 117/62 (11/30/24 0756)  SpO2: 95 % (11/30/24 0837) Vital Signs (24h Range):  Temp:  [97.9 °F (36.6 °C)-98.6 °F (37 °C)] 98 °F (36.7 °C)  Pulse:  [61-77] 65  Resp:  [16-20] 17  SpO2:  [89 %-96 %] 95 %  BP: (105-118)/(55-67) 117/62     Intake/Output - Last 3 Shifts         11/28 0700 11/29 0659 11/29 0700 11/30 0659 11/30 0700  12/01 0659    P.O. 120 1190     I.V. (mL/kg)  0 (0)     Other  0     IV Piggyback        Total Intake(mL/kg) 120 (2.3) 1190 (22.9)     Urine (mL/kg/hr) 1200 (1) 0 (0)     Emesis/NG output 0 0     Other 0 0     Stool 0 0     Blood 0      Chest Tube 138 60     Total Output 1338 60     Net -1218 +1130            Urine Occurrence 2 x 6 x     Stool Occurrence 0 x 0 x     Emesis Occurrence  0 x             SpO2: 95 %        Physical Exam  Constitutional:       General: She is not in acute distress.     Appearance: Normal appearance. She is not diaphoretic.   HENT:      Head: Normocephalic and atraumatic.   Eyes:      Pupils: Pupils are equal, round, and reactive to light.   Cardiovascular:      Rate and Rhythm: Normal rate and regular rhythm.   Pulmonary:      Effort: Pulmonary effort is normal. No respiratory distress.      Comments: Normal work of breathing.  Chest tube with black dot visible, removed  Abdominal:      General: There is no distension.      Palpations: Abdomen is soft.   Skin:     General: Skin is warm and dry.   Neurological:      General: No focal deficit present.      Mental Status: She is alert and oriented to person, place, and time.   Psychiatric:         Mood and Affect: Mood normal.         Behavior: Behavior normal.         Thought Content: Thought content normal.         Judgment: Judgment normal.            Significant Labs:  CBC:   Recent Labs   Lab 11/30/24  0241   WBC 6.34   RBC 3.60*   HGB 11.7*   HCT 33.9*      MCV 94   MCH 32.5*   MCHC 34.5     CMP:   Recent Labs   Lab 11/30/24  0241   GLU 87  85   CALCIUM 8.6*  8.8   ALBUMIN 3.0*   PROT 5.4*     137   K 4.1  4.0   CO2 25  27     106   BUN 15  16   CREATININE 0.6  0.6   ALKPHOS 74   ALT 11   AST 18   BILITOT 0.4       Significant Diagnostics:  I have reviewed all pertinent imaging results/findings within the past 24 hours.    VTE Risk Mitigation (From admission, onward)           Ordered     enoxaparin injection 40 mg  Daily         11/27/24 1413     IP VTE HIGH RISK PATIENT  Once          11/27/24 1413     Place WEI hose  Until discontinued         11/27/24 1413     Place sequential compression device  Until discontinued         11/27/24 1413     Place sequential compression device  Until discontinued         11/27/24 0252                  Assessment/Plan:     * Pneumothorax  Pt is a 65 yo female with recurrent spontaneous pneumothorax.      - status post VATS with chemical and mechanical pleurodesis on 11/27/2024.  - chest tube backed out, black dot visible.  Chest tube removed  - she feels well after 12:00 p.m., she can leave. no repeat chest x-ray needed  - discharge prescriptions and instructions completed, should just need discharge order  - Please call or page with any questions        Baldev Tom MD  Thoracic Surgery  [facility]

## 2024-12-02 DIAGNOSIS — J93.11 PRIMARY SPONTANEOUS PNEUMOTHORAX: Primary | ICD-10-CM

## 2024-12-03 ENCOUNTER — PATIENT MESSAGE (OUTPATIENT)
Dept: CARDIOTHORACIC SURGERY | Facility: CLINIC | Age: 64
End: 2024-12-03
Payer: COMMERCIAL

## 2024-12-05 ENCOUNTER — TELEPHONE (OUTPATIENT)
Dept: CARDIOTHORACIC SURGERY | Facility: CLINIC | Age: 64
End: 2024-12-05
Payer: COMMERCIAL

## 2024-12-05 ENCOUNTER — TELEPHONE (OUTPATIENT)
Dept: PULMONOLOGY | Facility: CLINIC | Age: 64
End: 2024-12-05
Payer: COMMERCIAL

## 2024-12-05 NOTE — TELEPHONE ENCOUNTER
----- Message from Dora sent at 12/5/2024  1:19 PM CST -----  Type:  Needs Medical Advice    Who Called: Pt Daughter     Would the patient rather a call back or a response via [facility]? call  Best Call Back Number: [phone]  Additional Information: Pt Daughter would like a call from nurse in office regarding schedule appointment on 12/09/2024

## 2024-12-06 NOTE — PROGRESS NOTES
"Subjective     Patient ID: Christin Luna is a 64 y.o. female.    Chief Complaint: Post-op Evaluation    Diagnosis: Recurrent pneumothorax     Pre-operative therapy: none    Procedure(s) and date(s): 11/27/24 - Left VATS pleurodesis    Pathology:     Post-operative therapy:      HPI  Patient is a 64 y.o. female with recurrent pneumothorax presents to clinic for 2 week follow up s/p left VATS pleurodesis. Tolerated procedure well. Uncomplicated post-op course. CT removed POD3. She reports feeling well. She endorses hallucinations and nightmares since discharge. Denies SOB    Review of Systems   Constitutional:  Negative for activity change and appetite change.   Respiratory:  Negative for cough and shortness of breath.    Cardiovascular:  Negative for chest pain.   Gastrointestinal:  Negative for change in bowel habit, constipation, diarrhea, nausea and vomiting.   Genitourinary:  Negative for difficulty urinating.   Neurological:  Negative for weakness.   Psychiatric/Behavioral:  Positive for hallucinations and sleep disturbance. The patient is nervous/anxious.           Objective   Vitals:    12/09/24 1005   BP: 115/70   Pulse: 68   SpO2: 97%   Weight: 52 kg (114 lb 10.2 oz)   Height: 5' 3" (1.6 m)   PainSc: 0-No pain         Physical Exam  Constitutional:       Appearance: Normal appearance.   HENT:      Head: Normocephalic and atraumatic.   Eyes:      Extraocular Movements: Extraocular movements intact.   Cardiovascular:      Rate and Rhythm: Normal rate and regular rhythm.      Pulses: Normal pulses.      Heart sounds: Normal heart sounds.   Pulmonary:      Effort: Pulmonary effort is normal. No respiratory distress.      Breath sounds: Normal breath sounds.   Chest:      Comments: VATS incisions CDI, healing nicely  Stitch intact  Musculoskeletal:      Cervical back: Normal range of motion.      Right lower leg: No edema.      Left lower leg: No edema.   Skin:     General: Skin is warm and dry.   Neurological: "      General: No focal deficit present.      Mental Status: She is alert.   Psychiatric:         Mood and Affect: Mood normal.         Behavior: Behavior normal.       CXR 12/9/24:  Of right lung is clear with a few fibrotic strands.  Chest tube on the left appears to been removed.  There is still a pneumothorax seen specially at the left lung base.  Some subcutaneous emphysema is seen along the left chest wall.  Small amount of pleural fluid is seen on the left.       Assessment and Plan   Patient is a 64 y.o. female with recurrent pneumothorax presents to clinic for 2 week follow up s/p left VATS pleurodesis.  Progressing well    PLAN  Suture removed in clinic  Stop gabapentin  RTC PRN

## 2024-12-06 NOTE — PROGRESS NOTES
[facility]  Pulmonary Clinic Note      History of Present Illness:  Christin Luna is a 64 y.o. female with PMHx emphysema and tobacco use who was referred to pulmonary clinic for follow up of pneumothorax.     Initial L sided PTX 11/10/24 which was managed with chest tube. Re-admitted 11/24/24 with large L PTX, L pigtail placed. Underwent VATS with pleurodesis 11/27/24.    Patient reports her breathing has greatly improved since VATS. She denies chest pain, cough, SOB. She quit smoking on 11/9 and has had no cravings for cigarettes since! Prescribed stiolto during hospital stay.     Pertinent History:  Pulm medications: stiolto, albuterol, nebs  Tobacco/Vape hx: 1 ppd x 40 years, quit 11/9  EtOH/illicit drug use: denies  Occupational hx:     Pulmonary/Cardiology Testing:    CT Chest 11/10/24  Impression:  Left-sided pleural drainage catheter in appropriate position.  Small residual left pneumothorax. Aerated portions of the lungs demonstrates diffuse centrilobular and paraseptal emphysematous change, fibro nodular biapical scarring, and bibasilar atelectasis. No large region consolidation or pleural effusion. Scattered pulmonary micro nodules. For multiple solid nodules all <6 mm, Fleischner Society 2017 guidelines recommend no routine follow up for a low risk patient, or follow up with non-contrast chest CT at 12 months after discovery in a high risk patient. Diffuse subcutaneous emphysema throughout the left axillary and thoracic soft tissues.    No past medical history on file.  Past Surgical History:   Procedure Laterality Date    INJECTION OF ANESTHETIC AGENT AROUND MULTIPLE INTERCOSTAL NERVES  11/27/2024    Procedure: BLOCK, NERVE, INTERCOSTAL, 2 OR MORE;  Surgeon: Farshad Valdovinos MD;  Location: [facility] OR [address] [place] [address];  Service: Cardiothoracic;;    PLEURODESIS WITH VIDEO-ASSISTED THORACOSCOPIC SURGERY (VATS) Left 11/27/2024    Procedure: VATS, WITH PLEURODESIS mechanical and  chemical pleruodesis;  Surgeon: Farshad Valdovinos MD;  Location: [facility] OR [address] [place] [address];  Service: Cardiothoracic;  Laterality: Left;     No family history on file.  Social History     Socioeconomic History    Marital status:    Occupational History    Occupation:    Tobacco Use    Smoking status: Former     Current packs/day: 1.00     Average packs/day: 1 pack/day for 34.9 years (34.9 ttl pk-yrs)     Types: Cigarettes     Start date: 1/1/1990    Smokeless tobacco: Never   Substance and Sexual Activity    Alcohol use: Never    Drug use: Not Currently    Sexual activity: Not Currently     Social Drivers of Health     Financial Resource Strain: Low Risk  (11/28/2024)    Overall Financial Resource Strain (CARDIA)     Difficulty of Paying Living Expenses: Not hard at all   Recent Concern: Financial Resource Strain - Medium Risk (11/25/2024)    Overall Financial Resource Strain (CARDIA)     Difficulty of Paying Living Expenses: Somewhat hard   Food Insecurity: No Food Insecurity (11/28/2024)    Hunger Vital Sign     Worried About Running Out of Food in the Last Year: Never true     Ran Out of Food in the Last Year: Never true   Recent Concern: Food Insecurity - Food Insecurity Present (11/10/2024)    Hunger Vital Sign     Worried About Running Out of Food in the Last Year: Sometimes true     Ran Out of Food in the Last Year: Sometimes true   Transportation Needs: No Transportation Needs (11/28/2024)    TRANSPORTATION NEEDS     Transportation : No   Physical Activity: Insufficiently Active (11/28/2024)    Exercise Vital Sign     Days of Exercise per Week: 3 days     Minutes of Exercise per Session: 30 min   Stress: No Stress Concern Present (11/28/2024)    [employer] - Occupational Stress Questionnaire     Feeling of Stress : Not at all   Housing Stability: Low Risk  (11/28/2024)    Housing Stability Vital Sign     Unable to Pay for Housing in the Last Year: No     Homeless in  "the Last Year: No     Review of patient's allergies indicates:  No Known Allergies    Review of Systems:  Review of Systems   Constitutional:  Negative for chills and fever.   HENT:  Negative for congestion and sinus pain.    Respiratory:  Negative for cough and shortness of breath.    Cardiovascular:  Negative for chest pain and claudication.   Neurological:  Negative for seizures and headaches.          OBJECTIVE:     Vital Signs  Vitals:    12/09/24 1333 12/09/24 1335   BP: 117/69 122/66   BP Location: Right arm Left arm   Patient Position: Sitting Sitting   Pulse: 69 69   SpO2: 96% 96%   Weight: 51.7 kg (114 lb) 51.7 kg (114 lb)   Height: 5' 3" (1.6 m) 5' 3" (1.6 m)     Body mass index is 20.19 kg/m².    Physical Exam:  Physical Exam  Vitals reviewed.   Constitutional:       General: She is not in acute distress.     Appearance: She is not toxic-appearing.   HENT:      Head: Normocephalic and atraumatic.      Mouth/Throat:      Mouth: Mucous membranes are moist.      Pharynx: Oropharynx is clear.   Eyes:      Extraocular Movements: Extraocular movements intact.      Conjunctiva/sclera: Conjunctivae normal.   Cardiovascular:      Rate and Rhythm: Normal rate and regular rhythm.   Pulmonary:      Effort: Pulmonary effort is normal. No respiratory distress.      Breath sounds: Normal breath sounds. No wheezing or rhonchi.   Musculoskeletal:      Cervical back: Normal range of motion and neck supple.   Skin:     General: Skin is warm and dry.      Capillary Refill: Capillary refill takes less than 2 seconds.   Neurological:      Mental Status: She is alert and oriented to person, place, and time.          Laboratory:  CBC  Lab Results   Component Value Date    WBC 6.34 11/30/2024    HGB 11.7 (L) 11/30/2024    HCT 33.9 (L) 11/30/2024     11/30/2024    MCV 94 11/30/2024    RDW 12.9 11/30/2024     BMP  Lab Results   Component Value Date     11/30/2024     11/30/2024    K 4.1 11/30/2024    K 4.0 " 11/30/2024     11/30/2024     11/30/2024    CO2 25 11/30/2024    CO2 27 11/30/2024    BUN 15 11/30/2024    BUN 16 11/30/2024    CREATININE 0.6 11/30/2024    CREATININE 0.6 11/30/2024    GLU 87 11/30/2024    GLU 85 11/30/2024    CALCIUM 8.6 (L) 11/30/2024    CALCIUM 8.8 11/30/2024    MG 2.0 11/12/2024    PHOS 3.5 11/12/2024     LFTs  Lab Results   Component Value Date    PROT 5.4 (L) 11/30/2024    ALBUMIN 3.0 (L) 11/30/2024    BILITOT 0.4 11/30/2024    AST 18 11/30/2024    ALKPHOS 74 11/30/2024    ALT 11 11/30/2024         ASSESSMENT/PLAN:     Problem List Items Addressed This Visit          Pulmonary    Pneumothorax on left - Primary    Current Assessment & Plan     - secondary spontaneous in setting of emphysema  - s/p VATS with pleurodesis 11/27/24  - small PTX left lung base on CXR from earlier today  - breathing at baseline         Centrilobular emphysema    Current Assessment & Plan     - no PFTs on file, hold off until next appointment given PTX  - continue stiolto and albuterol         Relevant Medications    albuterol sulfate (PROAIR RESPICLICK) 90 mcg/actuation inhaler    Pulmonary nodules    Current Assessment & Plan     - scattered micronodules on CT chest  - advised smoking cessation  - LDCT chest for screening due 11/2025            Other    Tobacco dependency    Current Assessment & Plan     - 40 PYH  - quit smoking one month ago!  - yearly LDCT chest             Return to clinic in 6 months   PFTs next visit    Geovanna Quiroz MD  Pulmonary/Critical Care  [facility]

## 2024-12-09 ENCOUNTER — HOSPITAL ENCOUNTER (OUTPATIENT)
Dept: RADIOLOGY | Facility: HOSPITAL | Age: 64
Discharge: HOME | End: 2024-12-09
Attending: STUDENT IN AN ORGANIZED HEALTH CARE EDUCATION/TRAINING PROGRAM
Payer: COMMERCIAL

## 2024-12-09 ENCOUNTER — OFFICE VISIT (OUTPATIENT)
Dept: PULMONOLOGY | Facility: CLINIC | Age: 64
End: 2024-12-09
Payer: COMMERCIAL

## 2024-12-09 ENCOUNTER — OFFICE VISIT (OUTPATIENT)
Dept: CARDIOTHORACIC SURGERY | Facility: CLINIC | Age: 64
End: 2024-12-09
Attending: STUDENT IN AN ORGANIZED HEALTH CARE EDUCATION/TRAINING PROGRAM
Payer: COMMERCIAL

## 2024-12-09 VITALS
SYSTOLIC BLOOD PRESSURE: 115 MMHG | OXYGEN SATURATION: 97 % | BODY MASS INDEX: 20.31 KG/M2 | HEART RATE: 68 BPM | HEIGHT: 63 IN | DIASTOLIC BLOOD PRESSURE: 70 MMHG | WEIGHT: 114.63 LBS

## 2024-12-09 VITALS
WEIGHT: 114 LBS | HEIGHT: 63 IN | DIASTOLIC BLOOD PRESSURE: 66 MMHG | BODY MASS INDEX: 20.2 KG/M2 | HEART RATE: 69 BPM | SYSTOLIC BLOOD PRESSURE: 122 MMHG | OXYGEN SATURATION: 96 %

## 2024-12-09 DIAGNOSIS — J93.12 SECONDARY SPONTANEOUS PNEUMOTHORAX: Primary | ICD-10-CM

## 2024-12-09 DIAGNOSIS — J93.9 PNEUMOTHORAX ON LEFT: Primary | ICD-10-CM

## 2024-12-09 DIAGNOSIS — R91.8 PULMONARY NODULES: ICD-10-CM

## 2024-12-09 DIAGNOSIS — J93.11 PRIMARY SPONTANEOUS PNEUMOTHORAX: ICD-10-CM

## 2024-12-09 DIAGNOSIS — J43.2 CENTRILOBULAR EMPHYSEMA: ICD-10-CM

## 2024-12-09 DIAGNOSIS — F17.200 TOBACCO DEPENDENCY: ICD-10-CM

## 2024-12-09 PROCEDURE — 3008F BODY MASS INDEX DOCD: CPT | Mod: CPTII,S$GLB,, | Performed by: STUDENT IN AN ORGANIZED HEALTH CARE EDUCATION/TRAINING PROGRAM

## 2024-12-09 PROCEDURE — 71046 X-RAY EXAM CHEST 2 VIEWS: CPT | Mod: 26,,, | Performed by: RADIOLOGY

## 2024-12-09 PROCEDURE — 99999 PR PBB SHADOW E&M-EST. PATIENT-LVL IV: CPT | Mod: PBBFAC,,, | Performed by: STUDENT IN AN ORGANIZED HEALTH CARE EDUCATION/TRAINING PROGRAM

## 2024-12-09 PROCEDURE — 99204 OFFICE O/P NEW MOD 45 MIN: CPT | Mod: S$GLB,,, | Performed by: STUDENT IN AN ORGANIZED HEALTH CARE EDUCATION/TRAINING PROGRAM

## 2024-12-09 PROCEDURE — 1159F MED LIST DOCD IN RCRD: CPT | Mod: CPTII,S$GLB,, | Performed by: STUDENT IN AN ORGANIZED HEALTH CARE EDUCATION/TRAINING PROGRAM

## 2024-12-09 PROCEDURE — 99999 PR PBB SHADOW E&M-EST. PATIENT-LVL III: CPT | Mod: PBBFAC,,, | Performed by: STUDENT IN AN ORGANIZED HEALTH CARE EDUCATION/TRAINING PROGRAM

## 2024-12-09 PROCEDURE — 3078F DIAST BP <80 MM HG: CPT | Mod: CPTII,S$GLB,, | Performed by: STUDENT IN AN ORGANIZED HEALTH CARE EDUCATION/TRAINING PROGRAM

## 2024-12-09 PROCEDURE — 3044F HG A1C LEVEL LT 7.0%: CPT | Mod: CPTII,S$GLB,, | Performed by: STUDENT IN AN ORGANIZED HEALTH CARE EDUCATION/TRAINING PROGRAM

## 2024-12-09 PROCEDURE — 3074F SYST BP LT 130 MM HG: CPT | Mod: CPTII,S$GLB,, | Performed by: STUDENT IN AN ORGANIZED HEALTH CARE EDUCATION/TRAINING PROGRAM

## 2024-12-09 PROCEDURE — 71046 X-RAY EXAM CHEST 2 VIEWS: CPT | Mod: TC

## 2024-12-09 NOTE — ASSESSMENT & PLAN NOTE
- scattered micronodules on CT chest  - advised smoking cessation  - LDCT chest for screening due 11/2025

## 2024-12-09 NOTE — ASSESSMENT & PLAN NOTE
- secondary spontaneous in setting of emphysema  - s/p VATS with pleurodesis 11/27/24  - small PTX left lung base on CXR from earlier today  - breathing at baseline

## 2024-12-12 ENCOUNTER — PATIENT MESSAGE (OUTPATIENT)
Dept: CARDIOTHORACIC SURGERY | Facility: CLINIC | Age: 64
End: 2024-12-12
Payer: COMMERCIAL

## 2024-12-16 NOTE — DISCHARGE SUMMARY
DISCHARGE SUMMARY  Hospital Medicine    Team: [facility]OS[facility] MED [facility]    Patient Name: Christin Luna  YOB: 1960    Admit Date: 11/26/2024    Discharge Date: 11/20/2024    Discharge Attending Physician: Kenya Rangel     Admitting Resident    Diagnoses:  Active Hospital Problems    Diagnosis  POA    *Pneumothorax [J93.9]  Yes    Chest pain on breathing [R07.1]  Yes    Centrilobular emphysema [J43.2]  Yes      Resolved Hospital Problems   No resolved problems to display.       Discharged Condition: admit problems have stabilized      Patient seen and examined on date of discharge. 45 min spent on face to face time and medical decision making.     Physical Exam  Constitutional:       General: She is not in acute distress.     Appearance: Normal appearance. She is not diaphoretic.   HENT:      Head: Normocephalic and atraumatic.   Eyes:      Pupils: Pupils are equal, round, and reactive to light.   Cardiovascular:      Rate and Rhythm: Normal rate and regular rhythm.   Pulmonary:      Effort: Pulmonary effort is normal. No respiratory distress.      Comments:   Abdominal:      General: There is no distension.      Palpations: Abdomen is soft.   Skin:     General: Skin is warm and dry.   Neurological:      General: No focal deficit present.      Mental Status: She is alert and oriented to person, place, and time.   Psychiatric:        HOSPITAL COURSE:      Initial Presentation:    Christin Luna is a 64 y.o. female with past medical history of  has no past medical history on file.. The patient presented to [facility] on 11/24/2024 with a primary complaint of chest pain for 1 day.     Patient was admitted to [facility] on 11/10 for sudden left sided chest pain that radiated ot her neck, followed by pleuritic chest pain and SOB. She was diagnosed with small-moderated left sided PTX, requiring left-sided pigtail catheter and pulmonology evaluation. Patient was discharged with plan for  pulmonology evaluation.      She remained at her baseline until 13:30 on 11/24, where patient reported left-sided chest pain that radiated to her left arm, back, and neck. The pain was present at rest and not triggered by exertion.  She initially tried to lay down but pain was worsening upon inspiration. She also had noticed that she developed general weakness with the pain. Family witnessed patient's presentation and brought her to the ED.   Denies chest trauma. Denies cough, fever, chills, nausea, vomiting. No recent travel since discharge.      In the ED, patient received a pigtail catheter in left side, and currently reports relief of shoulder, neck, back pain. She denied SOB on 4L NC.  Patient was weaned off O2 during admission.  She only is complaining of fatigue from not sleeping and chest pain in chest tube site.       Pneumothorax Spontaneous in setting of COPD  Pt is a 63 yo female with recurrent spontaneous pneumothorax.      OR with CTS VATS with pleurodesis  Chest tube removed successfully following reinflation of lung  Maximization of copd with inhalers.       CONSULTS: CTS    Last CBC/BMP/HgbA1c (if applicable):  No results found for this or any previous visit (from the past 2 weeks).  No results found for this or any previous visit (from the past 2 weeks).  Lab Results   Component Value Date    HGBA1C 5.4 11/11/2024       Disposition:  Home      Future Scheduled Appointments:  Future Appointments   Date Time Provider Department Center   5/20/2025 10:15 AM Arthur Bauman MD [facility]       Follow-up Plans from This Hospitalization:  Pulmonary     Discharge Medication List:        Medication List        START taking these medications      gabapentin 300 MG capsule  Commonly known as: NEURONTIN  Take 1 capsule (300 mg total) by mouth every evening.     oxyCODONE 10 mg Tab immediate release tablet  Commonly known as: ROXICODONE  Take 1 tablet (10 mg total) by mouth every 4 (four) hours as needed  for Pain.            CONTINUE taking these medications      albuterol-ipratropium 2.5 mg-0.5 mg/3 mL nebulizer solution  Commonly known as: DUO-NEB  Take 3 mLs by nebulization every 6 (six) hours as needed for Wheezing. Rescue     ALEVE ORAL     nebulizer accessories Kit  1 kit by Misc.(Non-Drug; Combo Route) route every 6 (six) hours as needed (SOB, wheezing).     nebulizer and compressor Iraida  1 Units by Misc.(Non-Drug; Combo Route) route every 6 (six) hours as needed (wheezing, SOB).     STIOLTO RESPIMAT 2.5-2.5 mcg/actuation Mist  Generic drug: tiotropium-olodateroL  Inhale 1 puff into the lungs once daily - Controller               Where to Get Your Medications        These medications were sent to [facility] [address], [place] [address]      Hours: Always Open Phone: [phone]   gabapentin 300 MG capsule  oxyCODONE 10 mg Tab immediate release tablet         Patient Instructions:  Discharge Procedure Orders   Ambulatory referral/consult to Pulmonology   Standing Status: Future   Referral Priority: Routine Referral Type: Consultation   Referral Reason: Specialty Services Required   Requested Specialty: Pulmonary Disease   Number of Visits Requested: 1     Diet Adult Regular     No driving until:   Order Comments: No longer taking narcotic pain medication and can turn around safely without pain.     Notify your health care provider if you experience any of the following:  temperature >100.4     Notify your health care provider if you experience any of the following:  severe uncontrolled pain     Notify your health care provider if you experience any of the following:  redness, tenderness, or signs of infection (pain, swelling, redness, odor or green/yellow discharge around incision site)     Notify your health care provider if you experience any of the following:  difficulty breathing or increased cough     Notify your health care provider if you experience any of the following:   worsening rash     Notify your health care provider if you experience any of the following:  persistent dizziness, light-headedness, or visual disturbances     Notify your health care provider if you experience any of the following:  increased confusion or weakness     Activity as tolerated   Order Comments: Okay to shower two days after chest tube removed. You can remove chest tube dressing at that time. Please do not submerge wounds underwater (no bath, no pool, no lake, etc.) for at least 2 weeks.       Signing Physician:  Kenya Rangel MD

## 2024-12-23 ENCOUNTER — PATIENT OUTREACH (OUTPATIENT)
Dept: ADMINISTRATIVE | Facility: CLINIC | Age: 64
End: 2024-12-23
Payer: COMMERCIAL

## 2024-12-23 NOTE — PROGRESS NOTES
C3 nurse spoke with Christin Luna  for a TCC post hospital discharge follow up call. The patient had a scheduled HOSFU appointment withArthur Bauman MD on 12/9/24 @ 9:45 am, that she did not go too. C3 nurse was unable to schedule [facility] appointment [facility]. Patient advised to contact their PCP to schedule a HOSPFU within 5-7 days.

## 2024-12-28 PROBLEM — R07.9 CHEST PAIN: Status: ACTIVE | Noted: 2024-12-28

## 2024-12-28 PROBLEM — Z72.0 TOBACCO USE: Status: ACTIVE | Noted: 2024-12-28

## (undated) DEVICE — SYR ONLY LUER LOCK 20CC

## (undated) DEVICE — TIP YANKAUERS BULB NO VENT

## (undated) DEVICE — DRESSING TRANS 4X4 TEGADERM

## (undated) DEVICE — DRESSING TRANS 2X2 TEGADERM

## (undated) DEVICE — GLOVE BIOGEL ORTHOPEDIC 7.5

## (undated) DEVICE — GAUZE DRAIN N WVN 6PLY 4X4IN

## (undated) DEVICE — NDL SPINAL 18GX3.5 SPINOCAN

## (undated) DEVICE — SUT 2-0 VICRYL / CT-1

## (undated) DEVICE — Device

## (undated) DEVICE — STAPLE TRI-STAPLE 2.0 CRV TIP

## (undated) DEVICE — ELECTRODE BLADE INSULATED 1 IN

## (undated) DEVICE — DRAIN CHAN RND HUBLS 8MM 24FR

## (undated) DEVICE — STAPLER GIA HANDLE STD

## (undated) DEVICE — DRAPE INCISE IOBAN 2 23X17IN

## (undated) DEVICE — SUT 0 30IN ETHIBOND EXCEL G

## (undated) DEVICE — ELECTRODE REM PLYHSV RETURN 9

## (undated) DEVICE — TUBING SUC UNIV W/CONN 12FT

## (undated) DEVICE — DRAPE ABDOMINAL TIBURON 14X11

## (undated) DEVICE — TRAY MINOR GEN SURG OMC

## (undated) DEVICE — KIT ANTIFOG W/SPONG & FLUID

## (undated) DEVICE — GAUZE WOVEN STRL 12-PLY 4X4IN

## (undated) DEVICE — RELOAD ENDO GIA TRISTAPLE 45MM

## (undated) DEVICE — SUT VICRYL CTD 2-0 GI 27 SH

## (undated) DEVICE — SUT MCRYL PLUS 4-0 PS2 27IN

## (undated) DEVICE — SUT 0 VICRYL / CT-1

## (undated) DEVICE — BLADE ELECTRO EDGE INSULATED

## (undated) DEVICE — DISSECTOR 5MM ENDOPATH

## (undated) DEVICE — PAD CURAD NONADH 3X4IN

## (undated) DEVICE — ADAPTER SWIVEL

## (undated) DEVICE — TAPE SILK 3IN

## (undated) DEVICE — BAG TISS RETRV MONARCH 10MM

## (undated) DEVICE — SUT MONOCRYL UD 4-0 27 PS-1